# Patient Record
Sex: FEMALE | Race: WHITE | Employment: OTHER | URBAN - METROPOLITAN AREA
[De-identification: names, ages, dates, MRNs, and addresses within clinical notes are randomized per-mention and may not be internally consistent; named-entity substitution may affect disease eponyms.]

---

## 2017-03-31 ENCOUNTER — OFFICE VISIT (OUTPATIENT)
Dept: PULMONOLOGY | Age: 47
End: 2017-03-31
Payer: COMMERCIAL

## 2017-03-31 VITALS
RESPIRATION RATE: 16 BRPM | WEIGHT: 115 LBS | HEIGHT: 65 IN | SYSTOLIC BLOOD PRESSURE: 114 MMHG | DIASTOLIC BLOOD PRESSURE: 84 MMHG | TEMPERATURE: 97.1 F | HEART RATE: 102 BPM | OXYGEN SATURATION: 98 % | BODY MASS INDEX: 19.16 KG/M2

## 2017-03-31 DIAGNOSIS — J92.9 PLEURAL PLAQUE WITHOUT ASBESTOS: ICD-10-CM

## 2017-03-31 DIAGNOSIS — J98.4 APICAL LUNG SCARRING: ICD-10-CM

## 2017-03-31 DIAGNOSIS — J43.1 PANLOBULAR EMPHYSEMA (HCC): ICD-10-CM

## 2017-03-31 DIAGNOSIS — Z72.0 TOBACCO ABUSE: ICD-10-CM

## 2017-03-31 DIAGNOSIS — J44.9 COPD, MILD (HCC): Primary | ICD-10-CM

## 2017-03-31 PROCEDURE — 99214 OFFICE O/P EST MOD 30 MIN: CPT | Performed by: INTERNAL MEDICINE

## 2017-03-31 RX ORDER — BENZTROPINE MESYLATE 0.5 MG/1
TABLET ORAL
Refills: 0 | COMMUNITY
Start: 2017-02-02 | End: 2017-03-31

## 2017-03-31 RX ORDER — MIRTAZAPINE 30 MG/1
TABLET, FILM COATED ORAL
Refills: 0 | COMMUNITY
Start: 2017-03-01 | End: 2021-09-15

## 2017-05-03 ENCOUNTER — HOSPITAL ENCOUNTER (OUTPATIENT)
Dept: PULMONOLOGY | Age: 47
Setting detail: THERAPIES SERIES
Discharge: HOME | End: 2017-05-03
Payer: COMMERCIAL

## 2017-05-03 VITALS — BODY MASS INDEX: 19.34 KG/M2 | WEIGHT: 116.2 LBS

## 2017-05-03 PROCEDURE — G0424 PULMONARY REHAB W EXER: HCPCS

## 2017-05-10 ENCOUNTER — TELEPHONE (OUTPATIENT)
Dept: PULMONOLOGY | Age: 47
End: 2017-05-10

## 2017-05-10 DIAGNOSIS — J44.9 CHRONIC OBSTRUCTIVE PULMONARY DISEASE, UNSPECIFIED COPD TYPE (HCC): Primary | ICD-10-CM

## 2017-05-31 ENCOUNTER — HOSPITAL ENCOUNTER (OUTPATIENT)
Dept: PULMONOLOGY | Age: 47
Setting detail: THERAPIES SERIES
Discharge: HOME | End: 2017-05-31
Payer: COMMERCIAL

## 2017-05-31 VITALS — WEIGHT: 117 LBS | BODY MASS INDEX: 19.47 KG/M2

## 2017-05-31 PROCEDURE — G0424 PULMONARY REHAB W EXER: HCPCS

## 2017-06-06 ENCOUNTER — TELEPHONE (OUTPATIENT)
Dept: FAMILY MEDICINE CLINIC | Age: 47
End: 2017-06-06

## 2017-06-20 ENCOUNTER — TELEPHONE (OUTPATIENT)
Dept: FAMILY MEDICINE CLINIC | Age: 47
End: 2017-06-20

## 2017-06-23 ENCOUNTER — OFFICE VISIT (OUTPATIENT)
Dept: FAMILY MEDICINE CLINIC | Age: 47
End: 2017-06-23
Payer: COMMERCIAL

## 2017-06-23 VITALS
HEART RATE: 102 BPM | SYSTOLIC BLOOD PRESSURE: 110 MMHG | BODY MASS INDEX: 18.32 KG/M2 | HEIGHT: 66 IN | RESPIRATION RATE: 20 BRPM | WEIGHT: 114 LBS | OXYGEN SATURATION: 96 % | TEMPERATURE: 98.1 F | DIASTOLIC BLOOD PRESSURE: 73 MMHG

## 2017-06-23 DIAGNOSIS — K59.01 SLOW TRANSIT CONSTIPATION: ICD-10-CM

## 2017-06-23 DIAGNOSIS — R73.9 HYPERGLYCEMIA: ICD-10-CM

## 2017-06-23 DIAGNOSIS — M89.9 BONE DISORDER: ICD-10-CM

## 2017-06-23 DIAGNOSIS — R53.82 CHRONIC FATIGUE: ICD-10-CM

## 2017-06-23 DIAGNOSIS — R63.4 UNINTENDED WEIGHT LOSS: Primary | ICD-10-CM

## 2017-06-23 DIAGNOSIS — Z13.220 ENCOUNTER FOR LIPID SCREENING FOR CARDIOVASCULAR DISEASE: ICD-10-CM

## 2017-06-23 DIAGNOSIS — Z13.6 ENCOUNTER FOR LIPID SCREENING FOR CARDIOVASCULAR DISEASE: ICD-10-CM

## 2017-06-23 DIAGNOSIS — M62.830 MUSCLE SPASM OF BACK: ICD-10-CM

## 2017-06-23 DIAGNOSIS — J43.1 PANLOBULAR EMPHYSEMA (HCC): ICD-10-CM

## 2017-06-23 DIAGNOSIS — M62.838 MUSCLE SPASMS OF NECK: ICD-10-CM

## 2017-06-23 LAB — HBA1C MFR BLD: 5.1 %

## 2017-06-23 PROCEDURE — 99214 OFFICE O/P EST MOD 30 MIN: CPT | Performed by: FAMILY MEDICINE

## 2017-06-23 PROCEDURE — 83036 HEMOGLOBIN GLYCOSYLATED A1C: CPT | Performed by: FAMILY MEDICINE

## 2017-06-23 RX ORDER — BACLOFEN 10 MG/1
10 TABLET ORAL 3 TIMES DAILY PRN
Qty: 90 TABLET | Refills: 0 | Status: SHIPPED | OUTPATIENT
Start: 2017-06-23 | End: 2017-07-25 | Stop reason: SDUPTHER

## 2017-06-23 RX ORDER — DOCUSATE SODIUM 100 MG/1
100 CAPSULE, LIQUID FILLED ORAL 2 TIMES DAILY PRN
Qty: 60 CAPSULE | Refills: 3 | Status: SHIPPED | OUTPATIENT
Start: 2017-06-23 | End: 2018-07-06

## 2017-06-23 ASSESSMENT — ENCOUNTER SYMPTOMS
WHEEZING: 0
NAUSEA: 0
CHEST TIGHTNESS: 0
DIARRHEA: 0
ABDOMINAL DISTENTION: 0
SHORTNESS OF BREATH: 1
BACK PAIN: 1
VOMITING: 0
COUGH: 1
ABDOMINAL PAIN: 0
CONSTIPATION: 1

## 2017-06-24 PROBLEM — K59.01 SLOW TRANSIT CONSTIPATION: Status: ACTIVE | Noted: 2017-06-24

## 2017-06-24 PROBLEM — M62.838 MUSCLE SPASMS OF NECK: Status: ACTIVE | Noted: 2017-06-24

## 2017-06-24 PROBLEM — R53.82 CHRONIC FATIGUE: Status: ACTIVE | Noted: 2017-06-24

## 2017-06-24 PROBLEM — J43.1 PANLOBULAR EMPHYSEMA (HCC): Status: ACTIVE | Noted: 2017-06-24

## 2017-06-24 PROBLEM — M62.830 MUSCLE SPASM OF BACK: Status: ACTIVE | Noted: 2017-06-24

## 2017-06-24 PROBLEM — R73.9 HYPERGLYCEMIA: Status: ACTIVE | Noted: 2017-06-24

## 2017-07-25 DIAGNOSIS — M62.830 MUSCLE SPASM OF BACK: ICD-10-CM

## 2017-07-25 DIAGNOSIS — M62.838 MUSCLE SPASMS OF NECK: ICD-10-CM

## 2017-07-25 RX ORDER — BACLOFEN 10 MG/1
TABLET ORAL
Qty: 90 TABLET | Refills: 0 | Status: SHIPPED | OUTPATIENT
Start: 2017-07-25 | End: 2018-07-06

## 2017-09-29 ENCOUNTER — OFFICE VISIT (OUTPATIENT)
Dept: PULMONOLOGY | Age: 47
End: 2017-09-29
Payer: COMMERCIAL

## 2017-09-29 VITALS — BODY MASS INDEX: 17.36 KG/M2 | OXYGEN SATURATION: 96 % | HEART RATE: 105 BPM | WEIGHT: 108 LBS | HEIGHT: 66 IN

## 2017-09-29 VITALS
DIASTOLIC BLOOD PRESSURE: 70 MMHG | BODY MASS INDEX: 17.36 KG/M2 | TEMPERATURE: 97.1 F | WEIGHT: 108 LBS | OXYGEN SATURATION: 99 % | HEART RATE: 99 BPM | RESPIRATION RATE: 16 BRPM | SYSTOLIC BLOOD PRESSURE: 103 MMHG | HEIGHT: 66 IN

## 2017-09-29 DIAGNOSIS — J43.1 PANLOBULAR EMPHYSEMA (HCC): ICD-10-CM

## 2017-09-29 DIAGNOSIS — J44.9 COPD, MILD (HCC): Primary | ICD-10-CM

## 2017-09-29 DIAGNOSIS — Z23 NEED FOR PNEUMOCOCCAL VACCINATION: ICD-10-CM

## 2017-09-29 DIAGNOSIS — J44.9 COPD, MILD (HCC): ICD-10-CM

## 2017-09-29 DIAGNOSIS — Z23 ENCOUNTER FOR IMMUNIZATION: ICD-10-CM

## 2017-09-29 PROCEDURE — G0009 ADMIN PNEUMOCOCCAL VACCINE: HCPCS | Performed by: INTERNAL MEDICINE

## 2017-09-29 PROCEDURE — G0008 ADMIN INFLUENZA VIRUS VAC: HCPCS | Performed by: INTERNAL MEDICINE

## 2017-09-29 PROCEDURE — 90688 IIV4 VACCINE SPLT 0.5 ML IM: CPT | Performed by: INTERNAL MEDICINE

## 2017-09-29 PROCEDURE — 94726 PLETHYSMOGRAPHY LUNG VOLUMES: CPT | Performed by: INTERNAL MEDICINE

## 2017-09-29 PROCEDURE — 99214 OFFICE O/P EST MOD 30 MIN: CPT | Performed by: INTERNAL MEDICINE

## 2017-09-29 PROCEDURE — 94060 EVALUATION OF WHEEZING: CPT | Performed by: INTERNAL MEDICINE

## 2017-09-29 PROCEDURE — 94729 DIFFUSING CAPACITY: CPT | Performed by: INTERNAL MEDICINE

## 2017-09-29 PROCEDURE — 90732 PPSV23 VACC 2 YRS+ SUBQ/IM: CPT | Performed by: INTERNAL MEDICINE

## 2018-06-29 ENCOUNTER — TELEPHONE (OUTPATIENT)
Dept: PULMONOLOGY | Age: 48
End: 2018-06-29

## 2018-07-06 ENCOUNTER — OFFICE VISIT (OUTPATIENT)
Dept: FAMILY MEDICINE CLINIC | Age: 48
End: 2018-07-06
Payer: COMMERCIAL

## 2018-07-06 VITALS
HEIGHT: 66 IN | DIASTOLIC BLOOD PRESSURE: 69 MMHG | WEIGHT: 117 LBS | OXYGEN SATURATION: 99 % | BODY MASS INDEX: 18.8 KG/M2 | HEART RATE: 101 BPM | SYSTOLIC BLOOD PRESSURE: 98 MMHG

## 2018-07-06 DIAGNOSIS — F43.10 PTSD (POST-TRAUMATIC STRESS DISORDER): ICD-10-CM

## 2018-07-06 DIAGNOSIS — Z13.31 POSITIVE DEPRESSION SCREENING: ICD-10-CM

## 2018-07-06 DIAGNOSIS — J43.1 PANLOBULAR EMPHYSEMA (HCC): ICD-10-CM

## 2018-07-06 DIAGNOSIS — R10.9 CHRONIC ABDOMINAL PAIN: ICD-10-CM

## 2018-07-06 DIAGNOSIS — G89.29 CHRONIC ABDOMINAL PAIN: ICD-10-CM

## 2018-07-06 DIAGNOSIS — K59.01 SLOW TRANSIT CONSTIPATION: Primary | ICD-10-CM

## 2018-07-06 DIAGNOSIS — F33.1 MODERATE EPISODE OF RECURRENT MAJOR DEPRESSIVE DISORDER (HCC): ICD-10-CM

## 2018-07-06 DIAGNOSIS — R73.9 HYPERGLYCEMIA: ICD-10-CM

## 2018-07-06 DIAGNOSIS — Z13.6 SCREENING FOR CARDIOVASCULAR CONDITION: ICD-10-CM

## 2018-07-06 LAB — HBA1C MFR BLD: 5.4 %

## 2018-07-06 PROCEDURE — G8420 CALC BMI NORM PARAMETERS: HCPCS | Performed by: FAMILY MEDICINE

## 2018-07-06 PROCEDURE — G8427 DOCREV CUR MEDS BY ELIG CLIN: HCPCS | Performed by: FAMILY MEDICINE

## 2018-07-06 PROCEDURE — G8431 POS CLIN DEPRES SCRN F/U DOC: HCPCS | Performed by: FAMILY MEDICINE

## 2018-07-06 PROCEDURE — G8926 SPIRO NO PERF OR DOC: HCPCS | Performed by: FAMILY MEDICINE

## 2018-07-06 PROCEDURE — 3023F SPIROM DOC REV: CPT | Performed by: FAMILY MEDICINE

## 2018-07-06 PROCEDURE — 96160 PT-FOCUSED HLTH RISK ASSMT: CPT | Performed by: FAMILY MEDICINE

## 2018-07-06 PROCEDURE — 83036 HEMOGLOBIN GLYCOSYLATED A1C: CPT | Performed by: FAMILY MEDICINE

## 2018-07-06 PROCEDURE — 4004F PT TOBACCO SCREEN RCVD TLK: CPT | Performed by: FAMILY MEDICINE

## 2018-07-06 PROCEDURE — 99214 OFFICE O/P EST MOD 30 MIN: CPT | Performed by: FAMILY MEDICINE

## 2018-07-06 RX ORDER — ESCITALOPRAM OXALATE 20 MG/1
20 TABLET ORAL DAILY
COMMUNITY

## 2018-07-06 RX ORDER — POLYETHYLENE GLYCOL 3350 17 G/17G
17 POWDER, FOR SOLUTION ORAL DAILY PRN
Qty: 1 BOTTLE | Refills: 3 | Status: SHIPPED | OUTPATIENT
Start: 2018-07-06 | End: 2018-12-27

## 2018-07-06 RX ORDER — SENNA AND DOCUSATE SODIUM 50; 8.6 MG/1; MG/1
1 TABLET, FILM COATED ORAL DAILY PRN
Qty: 30 TABLET | Refills: 0 | Status: SHIPPED | OUTPATIENT
Start: 2018-07-06 | End: 2020-05-11 | Stop reason: SDUPTHER

## 2018-07-06 ASSESSMENT — PATIENT HEALTH QUESTIONNAIRE - PHQ9
4. FEELING TIRED OR HAVING LITTLE ENERGY: 3
6. FEELING BAD ABOUT YOURSELF - OR THAT YOU ARE A FAILURE OR HAVE LET YOURSELF OR YOUR FAMILY DOWN: 1
2. FEELING DOWN, DEPRESSED OR HOPELESS: 1
8. MOVING OR SPEAKING SO SLOWLY THAT OTHER PEOPLE COULD HAVE NOTICED. OR THE OPPOSITE, BEING SO FIGETY OR RESTLESS THAT YOU HAVE BEEN MOVING AROUND A LOT MORE THAN USUAL: 0
SUM OF ALL RESPONSES TO PHQ9 QUESTIONS 1 & 2: 3
10. IF YOU CHECKED OFF ANY PROBLEMS, HOW DIFFICULT HAVE THESE PROBLEMS MADE IT FOR YOU TO DO YOUR WORK, TAKE CARE OF THINGS AT HOME, OR GET ALONG WITH OTHER PEOPLE: 2
7. TROUBLE CONCENTRATING ON THINGS, SUCH AS READING THE NEWSPAPER OR WATCHING TELEVISION: 3
3. TROUBLE FALLING OR STAYING ASLEEP: 0
5. POOR APPETITE OR OVEREATING: 0
SUM OF ALL RESPONSES TO PHQ QUESTIONS 1-9: 10
9. THOUGHTS THAT YOU WOULD BE BETTER OFF DEAD, OR OF HURTING YOURSELF: 0
1. LITTLE INTEREST OR PLEASURE IN DOING THINGS: 2

## 2018-07-06 ASSESSMENT — ENCOUNTER SYMPTOMS
NAUSEA: 0
CONSTIPATION: 1
WHEEZING: 0
RECTAL PAIN: 0
SHORTNESS OF BREATH: 1
BLOOD IN STOOL: 0
CHEST TIGHTNESS: 0
ABDOMINAL PAIN: 1
COUGH: 0
DIARRHEA: 0
VOMITING: 0
ABDOMINAL DISTENTION: 1

## 2018-07-06 NOTE — PATIENT INSTRUCTIONS
bowel movement. · Support your feet with a small step stool when you sit on the toilet. This helps flex your hips and places your pelvis in a squatting position. · Your doctor may recommend an over-the-counter laxative to relieve your constipation. Examples are Milk of Magnesia and MiraLax. Read and follow all instructions on the label. Do not use laxatives on a long-term basis. When should you call for help? Call your doctor now or seek immediate medical care if:    · You have new or worse belly pain.     · You have new or worse nausea or vomiting.     · You have blood in your stools.    Watch closely for changes in your health, and be sure to contact your doctor if:    · Your constipation is getting worse.     · You do not get better as expected. Where can you learn more? Go to https://[employer]. org and sign in to your [employer] account. Enter 21 [phone] in the [employer]box box to learn more about \"Constipation: Care Instructions. \"     If you do not have an account, please click on the \"Sign Up Now\" link. Current as of: November 20, 2017  Content Version: 11.6  © 4883-5109 [employer]. Care instructions adapted under license by [facility] ([facility]). If you have questions about a medical condition or this instruction, always ask your healthcare professional. [address] any warranty or liability for your use of this information.
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
## 2018-07-06 NOTE — PROGRESS NOTES
Addressed during office visit today, A1c*  within normal limits   Continue current treatment discussed during visit

## 2018-07-07 PROBLEM — M62.830 MUSCLE SPASM OF BACK: Status: RESOLVED | Noted: 2017-06-24 | Resolved: 2018-07-07

## 2018-07-07 PROBLEM — F43.10 PTSD (POST-TRAUMATIC STRESS DISORDER): Status: ACTIVE | Noted: 2018-07-07

## 2018-07-07 PROBLEM — G89.29 CHRONIC ABDOMINAL PAIN: Status: ACTIVE | Noted: 2018-07-07

## 2018-07-07 PROBLEM — R10.9 CHRONIC ABDOMINAL PAIN: Status: ACTIVE | Noted: 2018-07-07

## 2018-07-07 PROBLEM — M62.838 MUSCLE SPASMS OF NECK: Status: RESOLVED | Noted: 2017-06-24 | Resolved: 2018-07-07

## 2018-12-27 ENCOUNTER — HOSPITAL ENCOUNTER (EMERGENCY)
Age: 48
Discharge: HOME | End: 2018-12-27
Attending: EMERGENCY MEDICINE
Payer: COMMERCIAL

## 2018-12-27 ENCOUNTER — APPOINTMENT (OUTPATIENT)
Dept: CT IMAGING | Age: 48
End: 2018-12-27
Payer: COMMERCIAL

## 2018-12-27 ENCOUNTER — APPOINTMENT (OUTPATIENT)
Dept: GENERAL RADIOLOGY | Age: 48
End: 2018-12-27
Payer: COMMERCIAL

## 2018-12-27 VITALS
WEIGHT: 120 LBS | TEMPERATURE: 98.1 F | SYSTOLIC BLOOD PRESSURE: 115 MMHG | RESPIRATION RATE: 16 BRPM | BODY MASS INDEX: 19.29 KG/M2 | HEART RATE: 96 BPM | HEIGHT: 66 IN | DIASTOLIC BLOOD PRESSURE: 82 MMHG | OXYGEN SATURATION: 95 %

## 2018-12-27 DIAGNOSIS — R10.84 GENERALIZED ABDOMINAL PAIN: ICD-10-CM

## 2018-12-27 DIAGNOSIS — K59.00 CONSTIPATION, UNSPECIFIED CONSTIPATION TYPE: Primary | ICD-10-CM

## 2018-12-27 LAB
-: ABNORMAL
ABSOLUTE EOS #: 0.1 K/UL (ref 0–0.4)
ABSOLUTE IMMATURE GRANULOCYTE: ABNORMAL K/UL (ref 0–0.3)
ABSOLUTE LYMPH #: 1.3 K/UL (ref 1–4.8)
ABSOLUTE MONO #: 0.4 K/UL (ref 0.1–1.3)
ALBUMIN SERPL-MCNC: 3.9 G/DL (ref 3.5–5.2)
ALBUMIN/GLOBULIN RATIO: ABNORMAL (ref 1–2.5)
ALP BLD-CCNC: 79 U/L (ref 35–104)
ALT SERPL-CCNC: 8 U/L (ref 5–33)
AMORPHOUS: ABNORMAL
ANION GAP SERPL CALCULATED.3IONS-SCNC: 9 MMOL/L (ref 9–17)
AST SERPL-CCNC: 14 U/L
BACTERIA: ABNORMAL
BASOPHILS # BLD: 1 % (ref 0–2)
BASOPHILS ABSOLUTE: 0.1 K/UL (ref 0–0.2)
BILIRUB SERPL-MCNC: 0.15 MG/DL (ref 0.3–1.2)
BILIRUBIN URINE: ABNORMAL
BUN BLDV-MCNC: 10 MG/DL (ref 6–20)
BUN/CREAT BLD: ABNORMAL (ref 9–20)
CALCIUM SERPL-MCNC: 8.9 MG/DL (ref 8.6–10.4)
CASTS UA: ABNORMAL /LPF
CHLORIDE BLD-SCNC: 104 MMOL/L (ref 98–107)
CO2: 25 MMOL/L (ref 20–31)
COLOR: YELLOW
COMMENT UA: ABNORMAL
CREAT SERPL-MCNC: 0.67 MG/DL (ref 0.5–0.9)
CRYSTALS, UA: ABNORMAL /HPF
DIFFERENTIAL TYPE: ABNORMAL
EOSINOPHILS RELATIVE PERCENT: 2 % (ref 0–4)
EPITHELIAL CELLS UA: ABNORMAL /HPF
GFR AFRICAN AMERICAN: >60 ML/MIN
GFR NON-AFRICAN AMERICAN: >60 ML/MIN
GFR SERPL CREATININE-BSD FRML MDRD: ABNORMAL ML/MIN/{1.73_M2}
GFR SERPL CREATININE-BSD FRML MDRD: ABNORMAL ML/MIN/{1.73_M2}
GLUCOSE BLD-MCNC: 77 MG/DL (ref 70–99)
GLUCOSE URINE: NEGATIVE
HCT VFR BLD CALC: 34.7 % (ref 36–46)
HEMOGLOBIN: 11.7 G/DL (ref 12–16)
IMMATURE GRANULOCYTES: ABNORMAL %
KETONES, URINE: ABNORMAL
LEUKOCYTE ESTERASE, URINE: NEGATIVE
LIPASE: 35 U/L (ref 13–60)
LYMPHOCYTES # BLD: 17 % (ref 24–44)
MCH RBC QN AUTO: 29.8 PG (ref 26–34)
MCHC RBC AUTO-ENTMCNC: 33.8 G/DL (ref 31–37)
MCV RBC AUTO: 88.1 FL (ref 80–100)
MONOCYTES # BLD: 5 % (ref 1–7)
MUCUS: ABNORMAL
NITRITE, URINE: NEGATIVE
NRBC AUTOMATED: ABNORMAL PER 100 WBC
OTHER OBSERVATIONS UA: ABNORMAL
PDW BLD-RTO: 14.9 % (ref 11.5–14.9)
PH UA: 6.5 (ref 5–8)
PLATELET # BLD: 245 K/UL (ref 150–450)
PLATELET ESTIMATE: ABNORMAL
PMV BLD AUTO: 6.8 FL (ref 6–12)
POTASSIUM SERPL-SCNC: 4.1 MMOL/L (ref 3.7–5.3)
PROTEIN UA: NEGATIVE
RBC # BLD: 3.93 M/UL (ref 4–5.2)
RBC # BLD: ABNORMAL 10*6/UL
RBC UA: ABNORMAL /HPF
RENAL EPITHELIAL, UA: ABNORMAL /HPF
SEG NEUTROPHILS: 75 % (ref 36–66)
SEGMENTED NEUTROPHILS ABSOLUTE COUNT: 5.6 K/UL (ref 1.3–9.1)
SODIUM BLD-SCNC: 138 MMOL/L (ref 135–144)
SPECIFIC GRAVITY UA: 1.02 (ref 1–1.03)
TOTAL PROTEIN: 7.4 G/DL (ref 6.4–8.3)
TRICHOMONAS: ABNORMAL
TURBIDITY: CLEAR
URINE HGB: NEGATIVE
UROBILINOGEN, URINE: NORMAL
WBC # BLD: 7.4 K/UL (ref 3.5–11)
WBC # BLD: ABNORMAL 10*3/UL
WBC UA: ABNORMAL /HPF
YEAST: ABNORMAL

## 2018-12-27 PROCEDURE — 2580000003 HC RX 258: Performed by: EMERGENCY MEDICINE

## 2018-12-27 PROCEDURE — 6360000004 HC RX CONTRAST MEDICATION: Performed by: EMERGENCY MEDICINE

## 2018-12-27 PROCEDURE — 81001 URINALYSIS AUTO W/SCOPE: CPT

## 2018-12-27 PROCEDURE — 87086 URINE CULTURE/COLONY COUNT: CPT

## 2018-12-27 PROCEDURE — 96372 THER/PROPH/DIAG INJ SC/IM: CPT

## 2018-12-27 PROCEDURE — 96374 THER/PROPH/DIAG INJ IV PUSH: CPT

## 2018-12-27 PROCEDURE — 80053 COMPREHEN METABOLIC PANEL: CPT

## 2018-12-27 PROCEDURE — 99284 EMERGENCY DEPT VISIT MOD MDM: CPT

## 2018-12-27 PROCEDURE — 74177 CT ABD & PELVIS W/CONTRAST: CPT

## 2018-12-27 PROCEDURE — 83690 ASSAY OF LIPASE: CPT

## 2018-12-27 PROCEDURE — 74022 RADEX COMPL AQT ABD SERIES: CPT

## 2018-12-27 PROCEDURE — 6360000002 HC RX W HCPCS: Performed by: EMERGENCY MEDICINE

## 2018-12-27 PROCEDURE — 36415 COLL VENOUS BLD VENIPUNCTURE: CPT

## 2018-12-27 PROCEDURE — 85025 COMPLETE CBC W/AUTO DIFF WBC: CPT

## 2018-12-27 RX ORDER — 0.9 % SODIUM CHLORIDE 0.9 %
1000 INTRAVENOUS SOLUTION INTRAVENOUS ONCE
Status: COMPLETED | OUTPATIENT
Start: 2018-12-27 | End: 2018-12-27

## 2018-12-27 RX ORDER — BISACODYL 10 MG
10 SUPPOSITORY, RECTAL RECTAL DAILY
Qty: 30 SUPPOSITORY | Refills: 0 | Status: SHIPPED | OUTPATIENT
Start: 2018-12-27 | End: 2019-01-26

## 2018-12-27 RX ORDER — SODIUM CHLORIDE 0.9 % (FLUSH) 0.9 %
10 SYRINGE (ML) INJECTION PRN
Status: DISCONTINUED | OUTPATIENT
Start: 2018-12-27 | End: 2018-12-27 | Stop reason: HOSPADM

## 2018-12-27 RX ORDER — ONDANSETRON 2 MG/ML
4 INJECTION INTRAMUSCULAR; INTRAVENOUS ONCE
Status: COMPLETED | OUTPATIENT
Start: 2018-12-27 | End: 2018-12-27

## 2018-12-27 RX ORDER — TRAZODONE HYDROCHLORIDE 150 MG/1
75 TABLET ORAL NIGHTLY
COMMUNITY

## 2018-12-27 RX ORDER — ONDANSETRON 4 MG/1
4 TABLET, ORALLY DISINTEGRATING ORAL EVERY 8 HOURS PRN
Qty: 10 TABLET | Refills: 0 | Status: SHIPPED | OUTPATIENT
Start: 2018-12-27 | End: 2021-09-15

## 2018-12-27 RX ORDER — DICYCLOMINE HYDROCHLORIDE 10 MG/ML
20 INJECTION INTRAMUSCULAR ONCE
Status: COMPLETED | OUTPATIENT
Start: 2018-12-27 | End: 2018-12-27

## 2018-12-27 RX ORDER — 0.9 % SODIUM CHLORIDE 0.9 %
80 INTRAVENOUS SOLUTION INTRAVENOUS ONCE
Status: COMPLETED | OUTPATIENT
Start: 2018-12-27 | End: 2018-12-27

## 2018-12-27 RX ORDER — DICYCLOMINE HYDROCHLORIDE 10 MG/1
10 CAPSULE ORAL EVERY 6 HOURS PRN
Qty: 20 CAPSULE | Refills: 0 | Status: SHIPPED | OUTPATIENT
Start: 2018-12-27 | End: 2021-09-15

## 2018-12-27 RX ADMIN — DICYCLOMINE HYDROCHLORIDE 20 MG: 20 INJECTION, SOLUTION INTRAMUSCULAR at 12:34

## 2018-12-27 RX ADMIN — ONDANSETRON 4 MG: 2 INJECTION INTRAMUSCULAR; INTRAVENOUS at 12:35

## 2018-12-27 RX ADMIN — Medication 10 ML: at 13:31

## 2018-12-27 RX ADMIN — SODIUM CHLORIDE 80 ML: 9 INJECTION, SOLUTION INTRAVENOUS at 13:31

## 2018-12-27 RX ADMIN — SODIUM CHLORIDE 1000 ML: 9 INJECTION, SOLUTION INTRAVENOUS at 12:34

## 2018-12-27 RX ADMIN — IOPAMIDOL 75 ML: 755 INJECTION, SOLUTION INTRAVENOUS at 13:31

## 2018-12-27 ASSESSMENT — ENCOUNTER SYMPTOMS
EYE PAIN: 0
ABDOMINAL PAIN: 1
TROUBLE SWALLOWING: 0
FACIAL SWELLING: 0
RHINORRHEA: 0
COUGH: 0
EYE DISCHARGE: 0
SINUS PRESSURE: 0
SHORTNESS OF BREATH: 0
BLOOD IN STOOL: 0
ABDOMINAL DISTENTION: 1
EYE REDNESS: 0
NAUSEA: 1
VOMITING: 0
COLOR CHANGE: 0
SORE THROAT: 0
DIARRHEA: 0
WHEEZING: 0
BACK PAIN: 0
CONSTIPATION: 1
CHEST TIGHTNESS: 0

## 2018-12-27 ASSESSMENT — PAIN DESCRIPTION - PAIN TYPE: TYPE: CHRONIC PAIN

## 2018-12-27 ASSESSMENT — PAIN SCALES - GENERAL: PAINLEVEL_OUTOF10: 7

## 2018-12-27 ASSESSMENT — PAIN DESCRIPTION - LOCATION: LOCATION: ABDOMEN

## 2018-12-27 NOTE — ED PROVIDER NOTES
Murali       CURRENT MEDICATIONS       Previous Medications    CLONAZEPAM (KLONOPIN) 0.5 MG TABLET    Take 0.5 mg by mouth daily as needed for Anxiety     DIPHENHYDRAMINE (BENADRYL) 25 MG TABLET    Take 25 mg by mouth every 6 hours as needed for Itching    EPINEPHRINE (EPIPEN) 0.3 MG/0.3ML SOAJ INJECTION    Use as directed for allergic reaction    ESCITALOPRAM (LEXAPRO) 10 MG TABLET    Take 20 mg by mouth daily     IBUPROFEN (ADVIL;MOTRIN) 200 MG TABLET    Take 600 mg by mouth every 6 hours as needed for Pain    MIRTAZAPINE (REMERON) 30 MG TABLET    take 1 tablet by mouth at bedtime    QUETIAPINE (SEROQUEL) 200 MG TABLET    Take 1 tablet by mouth 2 times daily at 0800 and 1400    QUETIAPINE (SEROQUEL) 400 MG TABLET    Take 1 tablet by mouth daily    SENNOSIDES-DOCUSATE SODIUM (SENOKOT-S) 8.6-50 MG TABLET    Take 1 tablet by mouth daily as needed for Constipation Try once a week every week    TRAZODONE (DESYREL) 150 MG TABLET    Take 150 mg by mouth nightly       ALLERGIES     is allergic to bee pollen; neosporin [neomycin-polymyxin-gramicidin]; and triple antibiotic [neomycin-bacitracin zn-polymyx]. SOCIAL HISTORY      reports that she has been smoking Cigarettes. She has a 30.00 pack-year smoking history. She has never used smokeless tobacco. She reports that she drinks alcohol. She reports that she does not use drugs. PHYSICAL EXAM     INITIAL VITALS: /82   Pulse 116   Temp 98.1 °F (36.7 °C) (Oral)   Resp 16   Ht 5' 6\" (1.676 m)   Wt 120 lb (54.4 kg)   LMP  (LMP Unknown)   SpO2 99%   BMI 19.37 kg/m²      Physical Exam   Constitutional: She is oriented to person, place, and time. She appears well-developed and well-nourished. No distress. HENT:   Head: Normocephalic and atraumatic. Eyes: Pupils are equal, round, and reactive to light. Conjunctivae and EOM are normal. Right eye exhibits no discharge. Left eye exhibits no discharge. No scleral icterus.    Cardiovascular: Normal rate, regular rhythm and normal heart sounds. Exam reveals no gallop and no friction rub. No murmur heard. Pulmonary/Chest: Effort normal and breath sounds normal. No respiratory distress. She has no wheezes. She has no rales. She exhibits no tenderness. Abdominal: Soft. Bowel sounds are normal. She exhibits no distension and no mass. There is tenderness in the suprapubic area, left upper quadrant and left lower quadrant. There is no rigidity, no rebound, no guarding, no CVA tenderness, no tenderness at McBurney's point and negative Prieto's sign. Musculoskeletal: Normal range of motion. She exhibits no edema or tenderness. Neurological: She is alert and oriented to person, place, and time. She displays normal reflexes. No cranial nerve deficit. She exhibits normal muscle tone. Coordination normal.   Skin: Skin is warm and dry. No rash noted. She is not diaphoretic. No erythema. No pallor. Psychiatric: She has a normal mood and affect. Her behavior is normal. Judgment and thought content normal.   Nursing note and vitals reviewed. DIAGNOSTIC RESULTS     RADIOLOGY:All plain film, CT,MRI, and formal ultrasound images (except ED bedside ultrasound) are read by the radiologist and the interpretations are directly viewed by the emergency physician. Xr Acute Abd Series Chest 1 Vw    Result Date: 12/27/2018  EXAMINATION: TWO XRAY VIEWS OF THE ABDOMEN AND SINGLE  XRAY VIEW OF THE CHEST 12/27/2018 12:29 pm COMPARISON: None. HISTORY: ORDERING SYSTEM PROVIDED HISTORY: Pain TECHNOLOGIST PROVIDED HISTORY: Pain Ordering Physician Provided Reason for Exam: constipation, abd. pain Acuity: Unknown Type of Exam: Unknown FINDINGS: The cardiomediastinal silhouette is unremarkable. No acute infiltrate, pleural fluid or evidence of overt failure. Emphysematous changes with biapical fibrotic changes are noted. The abdominal bowel gas pattern is nonspecific. Scattered colonic gas and stool.   No small bowel distention, air-fluid Vomiting     Dispense:  10 tablet     Refill:  0    dicyclomine (BENTYL) 10 MG capsule     Sig: Take 1 capsule by mouth every 6 hours as needed (cramps)     Dispense:  20 capsule     Refill:  0    magnesium citrate solution     Sig: Take 296 mLs by mouth once for 1 dose     Dispense:  296 mL     Refill:  0    bisacodyl (DULCOLAX) 10 MG suppository     Sig: Place 1 suppository rectally daily     Dispense:  30 suppository     Refill:  0       -------------------------  2:11 PM  Patient was updated on results. I offered to give the patient an enema here to see if she could have a good bowel movement and feel better but she would prefer to do this at home. We'll give her a spot story and laxatives at home and then have her follow-up with PCP. CONSULTS:  None    PROCEDURES:  None    FINAL IMPRESSION      1. Constipation, unspecified constipation type    2.  Generalized abdominal pain          DISPOSITION/PLAN   DISPOSITION Decision To Discharge 12/27/2018 02:08:46 PM      PATIENT REFERREDTO:  Eden Mcgarry MD  [address] [place] [address].  [address] [address]  [phone]    In 1 week      [facility]  [facility] [address]  [phone]    If symptoms worsen      DISCHARGEMEDICATIONS:  New Prescriptions    BISACODYL (DULCOLAX) 10 MG SUPPOSITORY    Place 1 suppository rectally daily    DICYCLOMINE (BENTYL) 10 MG CAPSULE    Take 1 capsule by mouth every 6 hours as needed (cramps)    MAGNESIUM CITRATE SOLUTION    Take 296 mLs by mouth once for 1 dose    ONDANSETRON (ZOFRAN ODT) 4 MG DISINTEGRATING TABLET    Take 1 tablet by mouth every 8 hours as needed for Nausea or Vomiting       (Please note that portions of this note were completed with a voice recognition program.  Efforts were made to edit thedictations but occasionally words are mis-transcribed.)    Chris Costello MD  Attending Emergency Physician                        Chris Costello MD  12/27/18 1439

## 2018-12-28 ENCOUNTER — TELEPHONE (OUTPATIENT)
Dept: FAMILY MEDICINE CLINIC | Age: 48
End: 2018-12-28

## 2018-12-28 LAB
CULTURE: NORMAL
Lab: NORMAL
SPECIMEN DESCRIPTION: NORMAL
STATUS: NORMAL

## 2020-05-11 ENCOUNTER — TELEPHONE (OUTPATIENT)
Dept: ADMINISTRATIVE | Age: 50
End: 2020-05-11

## 2020-05-11 ENCOUNTER — APPOINTMENT (OUTPATIENT)
Dept: GENERAL RADIOLOGY | Age: 50
End: 2020-05-11
Payer: COMMERCIAL

## 2020-05-11 ENCOUNTER — HOSPITAL ENCOUNTER (EMERGENCY)
Age: 50
Discharge: HOME | End: 2020-05-11
Attending: EMERGENCY MEDICINE
Payer: COMMERCIAL

## 2020-05-11 VITALS
RESPIRATION RATE: 14 BRPM | DIASTOLIC BLOOD PRESSURE: 75 MMHG | SYSTOLIC BLOOD PRESSURE: 113 MMHG | HEART RATE: 102 BPM | BODY MASS INDEX: 17.68 KG/M2 | TEMPERATURE: 97.3 F | HEIGHT: 66 IN | WEIGHT: 110 LBS | OXYGEN SATURATION: 98 %

## 2020-05-11 LAB
-: NORMAL
ABSOLUTE EOS #: 0.3 K/UL (ref 0–0.4)
ABSOLUTE IMMATURE GRANULOCYTE: ABNORMAL K/UL (ref 0–0.3)
ABSOLUTE LYMPH #: 1.7 K/UL (ref 1–4.8)
ABSOLUTE MONO #: 0.5 K/UL (ref 0.1–1.3)
ALBUMIN SERPL-MCNC: 4.6 G/DL (ref 3.5–5.2)
ALBUMIN/GLOBULIN RATIO: ABNORMAL (ref 1–2.5)
ALP BLD-CCNC: 82 U/L (ref 35–104)
ALT SERPL-CCNC: 9 U/L (ref 5–33)
AMORPHOUS: NORMAL
ANION GAP SERPL CALCULATED.3IONS-SCNC: 13 MMOL/L (ref 9–17)
AST SERPL-CCNC: 16 U/L
BACTERIA: NORMAL
BASOPHILS # BLD: 1 % (ref 0–2)
BASOPHILS ABSOLUTE: 0.1 K/UL (ref 0–0.2)
BILIRUB SERPL-MCNC: 0.29 MG/DL (ref 0.3–1.2)
BILIRUBIN URINE: NEGATIVE
BUN BLDV-MCNC: 11 MG/DL (ref 6–20)
BUN/CREAT BLD: ABNORMAL (ref 9–20)
CALCIUM SERPL-MCNC: 9.3 MG/DL (ref 8.6–10.4)
CASTS UA: NORMAL /LPF
CHLORIDE BLD-SCNC: 105 MMOL/L (ref 98–107)
CO2: 22 MMOL/L (ref 20–31)
COLOR: YELLOW
COMMENT UA: ABNORMAL
CREAT SERPL-MCNC: 0.88 MG/DL (ref 0.5–0.9)
CRYSTALS, UA: NORMAL /HPF
DIFFERENTIAL TYPE: ABNORMAL
EOSINOPHILS RELATIVE PERCENT: 6 % (ref 0–4)
EPITHELIAL CELLS UA: NORMAL /HPF
GFR AFRICAN AMERICAN: >60 ML/MIN
GFR NON-AFRICAN AMERICAN: >60 ML/MIN
GFR SERPL CREATININE-BSD FRML MDRD: ABNORMAL ML/MIN/{1.73_M2}
GFR SERPL CREATININE-BSD FRML MDRD: ABNORMAL ML/MIN/{1.73_M2}
GLUCOSE BLD-MCNC: 96 MG/DL (ref 70–99)
GLUCOSE URINE: NEGATIVE
HCG QUALITATIVE: NEGATIVE
HCT VFR BLD CALC: 41.2 % (ref 36–46)
HEMOGLOBIN: 13.4 G/DL (ref 12–16)
IMMATURE GRANULOCYTES: ABNORMAL %
KETONES, URINE: NEGATIVE
LEUKOCYTE ESTERASE, URINE: NEGATIVE
LIPASE: 41 U/L (ref 13–60)
LYMPHOCYTES # BLD: 29 % (ref 24–44)
MCH RBC QN AUTO: 28.5 PG (ref 26–34)
MCHC RBC AUTO-ENTMCNC: 32.4 G/DL (ref 31–37)
MCV RBC AUTO: 87.8 FL (ref 80–100)
MONOCYTES # BLD: 8 % (ref 1–7)
MUCUS: NORMAL
NITRITE, URINE: NEGATIVE
NRBC AUTOMATED: ABNORMAL PER 100 WBC
OTHER OBSERVATIONS UA: NORMAL
PDW BLD-RTO: 13.7 % (ref 11.5–14.9)
PH UA: 6.5 (ref 5–8)
PLATELET # BLD: 270 K/UL (ref 150–450)
PLATELET ESTIMATE: ABNORMAL
PMV BLD AUTO: 7.3 FL (ref 6–12)
POTASSIUM SERPL-SCNC: 4.4 MMOL/L (ref 3.7–5.3)
PROTEIN UA: NEGATIVE
RBC # BLD: 4.7 M/UL (ref 4–5.2)
RBC # BLD: ABNORMAL 10*6/UL
RBC UA: NORMAL /HPF
RENAL EPITHELIAL, UA: NORMAL /HPF
SEG NEUTROPHILS: 56 % (ref 36–66)
SEGMENTED NEUTROPHILS ABSOLUTE COUNT: 3.3 K/UL (ref 1.3–9.1)
SODIUM BLD-SCNC: 140 MMOL/L (ref 135–144)
SPECIFIC GRAVITY UA: 1.01 (ref 1–1.03)
TOTAL PROTEIN: 7.8 G/DL (ref 6.4–8.3)
TRICHOMONAS: NORMAL
TURBIDITY: ABNORMAL
URINE HGB: NEGATIVE
UROBILINOGEN, URINE: NORMAL
WBC # BLD: 5.8 K/UL (ref 3.5–11)
WBC # BLD: ABNORMAL 10*3/UL
WBC UA: NORMAL /HPF
YEAST: NORMAL

## 2020-05-11 PROCEDURE — 81001 URINALYSIS AUTO W/SCOPE: CPT

## 2020-05-11 PROCEDURE — 96372 THER/PROPH/DIAG INJ SC/IM: CPT

## 2020-05-11 PROCEDURE — 85025 COMPLETE CBC W/AUTO DIFF WBC: CPT

## 2020-05-11 PROCEDURE — 99284 EMERGENCY DEPT VISIT MOD MDM: CPT

## 2020-05-11 PROCEDURE — 36415 COLL VENOUS BLD VENIPUNCTURE: CPT

## 2020-05-11 PROCEDURE — 6360000002 HC RX W HCPCS: Performed by: EMERGENCY MEDICINE

## 2020-05-11 PROCEDURE — 74018 RADEX ABDOMEN 1 VIEW: CPT

## 2020-05-11 PROCEDURE — 80053 COMPREHEN METABOLIC PANEL: CPT

## 2020-05-11 PROCEDURE — 83690 ASSAY OF LIPASE: CPT

## 2020-05-11 PROCEDURE — 2580000003 HC RX 258: Performed by: EMERGENCY MEDICINE

## 2020-05-11 PROCEDURE — 84703 CHORIONIC GONADOTROPIN ASSAY: CPT

## 2020-05-11 RX ORDER — DOCUSATE SODIUM 100 MG/1
100 CAPSULE, LIQUID FILLED ORAL 2 TIMES DAILY
Qty: 30 CAPSULE | Refills: 0 | Status: SHIPPED | OUTPATIENT
Start: 2020-05-11 | End: 2021-12-16 | Stop reason: ALTCHOICE

## 2020-05-11 RX ORDER — SENNA AND DOCUSATE SODIUM 50; 8.6 MG/1; MG/1
1 TABLET, FILM COATED ORAL DAILY PRN
Qty: 10 TABLET | Refills: 0 | Status: SHIPPED | OUTPATIENT
Start: 2020-05-11 | End: 2021-12-16 | Stop reason: ALTCHOICE

## 2020-05-11 RX ORDER — DICYCLOMINE HYDROCHLORIDE 10 MG/ML
20 INJECTION INTRAMUSCULAR ONCE
Status: COMPLETED | OUTPATIENT
Start: 2020-05-11 | End: 2020-05-11

## 2020-05-11 RX ORDER — 0.9 % SODIUM CHLORIDE 0.9 %
1000 INTRAVENOUS SOLUTION INTRAVENOUS ONCE
Status: COMPLETED | OUTPATIENT
Start: 2020-05-11 | End: 2020-05-11

## 2020-05-11 RX ADMIN — SODIUM CHLORIDE 1000 ML: 9 INJECTION, SOLUTION INTRAVENOUS at 14:33

## 2020-05-11 RX ADMIN — DICYCLOMINE HYDROCHLORIDE 20 MG: 20 INJECTION, SOLUTION INTRAMUSCULAR at 14:33

## 2020-05-11 ASSESSMENT — PAIN DESCRIPTION - DESCRIPTORS: DESCRIPTORS: PRESSURE

## 2020-05-11 ASSESSMENT — PAIN DESCRIPTION - LOCATION: LOCATION: ABDOMEN

## 2020-05-11 ASSESSMENT — ENCOUNTER SYMPTOMS
WHEEZING: 0
SORE THROAT: 0
NAUSEA: 0
EYE DISCHARGE: 0
FACIAL SWELLING: 0
EYE PAIN: 0
RHINORRHEA: 0
CHEST TIGHTNESS: 0
SINUS PRESSURE: 0
CONSTIPATION: 1
VOMITING: 0
DIARRHEA: 0
BACK PAIN: 1
SHORTNESS OF BREATH: 0
BLOOD IN STOOL: 0
COLOR CHANGE: 0
TROUBLE SWALLOWING: 0
EYE REDNESS: 0
COUGH: 0
ABDOMINAL PAIN: 1

## 2020-05-11 ASSESSMENT — PAIN DESCRIPTION - ORIENTATION: ORIENTATION: LOWER;MID

## 2020-05-11 ASSESSMENT — PAIN SCALES - GENERAL: PAINLEVEL_OUTOF10: 6

## 2020-05-11 NOTE — ED PROVIDER NOTES
25 mg by mouth every 6 hours as needed for Itching    EPINEPHRINE (EPIPEN) 0.3 MG/0.3ML SOAJ INJECTION    Use as directed for allergic reaction    ESCITALOPRAM (LEXAPRO) 10 MG TABLET    Take 20 mg by mouth daily     IBUPROFEN (ADVIL;MOTRIN) 200 MG TABLET    Take 600 mg by mouth every 6 hours as needed for Pain    MIRTAZAPINE (REMERON) 30 MG TABLET    take 1 tablet by mouth at bedtime    ONDANSETRON (ZOFRAN ODT) 4 MG DISINTEGRATING TABLET    Take 1 tablet by mouth every 8 hours as needed for Nausea or Vomiting    QUETIAPINE (SEROQUEL) 200 MG TABLET    Take 1 tablet by mouth 2 times daily at 0800 and 1400    QUETIAPINE (SEROQUEL) 400 MG TABLET    Take 1 tablet by mouth daily    TRAZODONE (DESYREL) 150 MG TABLET    Take 75 mg by mouth nightly        ALLERGIES     is allergic to bee pollen; neosporin [neomycin-polymyxin-gramicidin]; and triple antibiotic [neomycin-bacitracin zn-polymyx]. SOCIAL HISTORY      reports that she has been smoking cigarettes. She has a 30.00 pack-year smoking history. She has never used smokeless tobacco. She reports current alcohol use. She reports that she does not use drugs. PHYSICAL EXAM     INITIAL VITALS: BP (!) 133/93   Pulse 114   Temp 97.3 °F (36.3 °C) (Temporal)   Resp 14   Ht 5' 6\" (1.676 m)   Wt 110 lb (49.9 kg)   LMP  (LMP Unknown)   SpO2 98%   BMI 17.75 kg/m²      Physical Exam  Vitals signs and nursing note reviewed. Constitutional:       General: She is in acute distress. Appearance: She is well-developed. She is not diaphoretic. HENT:      Head: Normocephalic and atraumatic. Eyes:      General: No scleral icterus. Right eye: No discharge. Left eye: No discharge. Conjunctiva/sclera: Conjunctivae normal.      Pupils: Pupils are equal, round, and reactive to light. Cardiovascular:      Rate and Rhythm: Normal rate and regular rhythm. Heart sounds: Normal heart sounds. No murmur. No friction rub. No gallop.     Pulmonary: below. Labs Reviewed   CBC WITH AUTO DIFFERENTIAL - Abnormal; Notable for the following components:       Result Value    Monocytes 8 (*)     Eosinophils % 6 (*)     All other components within normal limits   COMPREHENSIVE METABOLIC PANEL - Abnormal; Notable for the following components: Total Bilirubin 0.29 (*)     All other components within normal limits   URINE RT REFLEX TO CULTURE - Abnormal; Notable for the following components:    Turbidity UA CLOUDY (*)     All other components within normal limits   LIPASE   HCG, SERUM, QUALITATIVE   MICROSCOPIC URINALYSIS         MEDICAL DECISION MAKING:     Patient has a long history of constipation and she does have fullness especially in the left lower portion of her abdomen that could be consistent with constipation but her symptoms seem very severe so I do want to do a full her work-up. We will get an x-ray to make sure there is nothing more serious if it does show a lot of stool we will attempt an enema to try to give her relief. EMERGENCY DEPARTMENT COURSE:   Vitals:    Vitals:    05/11/20 1405   BP: (!) 133/93   Pulse: 114   Resp: 14   Temp: 97.3 °F (36.3 °C)   TempSrc: Temporal   SpO2: 98%   Weight: 110 lb (49.9 kg)   Height: 5' 6\" (1.676 m)       The patient was given the following medications while in the emergency department:  Orders Placed This Encounter   Medications    0.9 % sodium chloride bolus    dicyclomine (BENTYL) injection 20 mg    docusate sodium (COLACE) 100 MG capsule     Sig: Take 1 capsule by mouth 2 times daily     Dispense:  30 capsule     Refill:  0    sennosides-docusate sodium (SENOKOT-S) 8.6-50 MG tablet     Sig: Take 1 tablet by mouth daily as needed for Constipation Try once a week every week     Dispense:  10 tablet     Refill:  0    magnesium citrate solution     Sig: Take 296 mLs by mouth once for 1 dose     Dispense:  296 mL     Refill:  0       -------------------------  4:11 PM EDT  Patient was updated on results.

## 2020-05-11 NOTE — ED NOTES
Pt given instructions for follow-up and discharge. Pt given education on prescriptions. Pt verbalizes understanding. Pt is A&O x4, PWD, eupneic, and ambulatory with steady, even gait upon discharge.       Carli Tuttle RN  05/11/20 [id number]

## 2020-05-12 ENCOUNTER — CARE COORDINATION (OUTPATIENT)
Dept: CARE COORDINATION | Age: 50
End: 2020-05-12

## 2020-05-12 ENCOUNTER — TELEPHONE (OUTPATIENT)
Dept: FAMILY MEDICINE CLINIC | Age: 50
End: 2020-05-12

## 2020-05-13 ENCOUNTER — CARE COORDINATION (OUTPATIENT)
Dept: CARE COORDINATION | Age: 50
End: 2020-05-13

## 2020-05-13 ENCOUNTER — TELEPHONE (OUTPATIENT)
Dept: FAMILY MEDICINE CLINIC | Age: 50
End: 2020-05-13

## 2020-05-13 NOTE — CARE COORDINATION
water for at least 20 seconds, especially after blowing your nose, coughing, or sneezing; going to the bathroom; and before eating or preparing food. If soap and water are not readily available, use an alcohol-based hand  with at least 60% alcohol, covering all surfaces of your hands and rubbing them together until they feel dry. Soap and water are the best option if hands are visibly dirty. Avoid touching your eyes, nose, and mouth with unwashed hands. Avoid sharing personal household items  You should not share dishes, drinking glasses, cups, eating utensils, towels, or bedding with other people or pets in your home. After using these items, they should be washed thoroughly with soap and water. Clean all high-touch surfaces everyday  High touch surfaces include counters, tabletops, doorknobs, bathroom fixtures, toilets, phones, keyboards, tablets, and bedside tables. Also, clean any surfaces that may have blood, stool, or body fluids on them. Use a household cleaning spray or wipe, according to the label instructions. Labels contain instructions for safe and effective use of the cleaning product including precautions you should take when applying the product, such as wearing gloves and making sure you have good ventilation during use of the product. Monitor your symptoms  Seek prompt medical attention if your illness is worsening (e.g., difficulty breathing). Before seeking care, call your healthcare provider and tell them that you have, or are being evaluated for, COVID-19. Put on a facemask before you enter the facility. These steps will help the healthcare providers office to keep other people in the office or waiting room from getting infected or exposed. Ask your healthcare provider to call the local or state health department.  Persons who are placed under active monitoring or facilitated self-monitoring should follow instructions provided by their local health department or occupational

## 2020-07-23 ENCOUNTER — APPOINTMENT (OUTPATIENT)
Dept: GENERAL RADIOLOGY | Age: 50
End: 2020-07-23
Payer: COMMERCIAL

## 2020-07-23 ENCOUNTER — HOSPITAL ENCOUNTER (EMERGENCY)
Age: 50
Discharge: HOME | End: 2020-07-23
Attending: EMERGENCY MEDICINE
Payer: COMMERCIAL

## 2020-07-23 VITALS
TEMPERATURE: 97.8 F | OXYGEN SATURATION: 100 % | WEIGHT: 110 LBS | DIASTOLIC BLOOD PRESSURE: 87 MMHG | BODY MASS INDEX: 17.68 KG/M2 | RESPIRATION RATE: 16 BRPM | SYSTOLIC BLOOD PRESSURE: 130 MMHG | HEIGHT: 66 IN | HEART RATE: 93 BPM

## 2020-07-23 PROCEDURE — 6360000002 HC RX W HCPCS: Performed by: EMERGENCY MEDICINE

## 2020-07-23 PROCEDURE — 6370000000 HC RX 637 (ALT 250 FOR IP): Performed by: EMERGENCY MEDICINE

## 2020-07-23 PROCEDURE — 96372 THER/PROPH/DIAG INJ SC/IM: CPT

## 2020-07-23 PROCEDURE — 99284 EMERGENCY DEPT VISIT MOD MDM: CPT

## 2020-07-23 PROCEDURE — 71045 X-RAY EXAM CHEST 1 VIEW: CPT

## 2020-07-23 RX ORDER — IBUPROFEN 600 MG/1
600 TABLET ORAL EVERY 6 HOURS PRN
Qty: 120 TABLET | Refills: 0 | Status: SHIPPED | OUTPATIENT
Start: 2020-07-23 | End: 2021-09-27 | Stop reason: HOSPADM

## 2020-07-23 RX ORDER — LIDOCAINE 50 MG/G
1 PATCH TOPICAL DAILY
Qty: 30 PATCH | Refills: 0 | Status: SHIPPED | OUTPATIENT
Start: 2020-07-23 | End: 2021-09-15

## 2020-07-23 RX ORDER — LIDOCAINE 4 G/G
1 PATCH TOPICAL DAILY
Status: DISCONTINUED | OUTPATIENT
Start: 2020-07-23 | End: 2020-07-23 | Stop reason: HOSPADM

## 2020-07-23 RX ORDER — HYDROCODONE BITARTRATE AND ACETAMINOPHEN 5; 325 MG/1; MG/1
1 TABLET ORAL EVERY 6 HOURS PRN
Qty: 12 TABLET | Refills: 0 | Status: SHIPPED | OUTPATIENT
Start: 2020-07-23 | End: 2020-07-26

## 2020-07-23 RX ORDER — MORPHINE SULFATE 4 MG/ML
4 INJECTION, SOLUTION INTRAMUSCULAR; INTRAVENOUS ONCE
Status: COMPLETED | OUTPATIENT
Start: 2020-07-23 | End: 2020-07-23

## 2020-07-23 RX ADMIN — MORPHINE SULFATE 4 MG: 4 INJECTION, SOLUTION INTRAMUSCULAR; INTRAVENOUS at 13:44

## 2020-07-23 ASSESSMENT — ENCOUNTER SYMPTOMS
SHORTNESS OF BREATH: 0
BACK PAIN: 0
VOMITING: 0
COUGH: 0
NAUSEA: 0

## 2020-07-23 ASSESSMENT — PAIN SCALES - GENERAL
PAINLEVEL_OUTOF10: 7
PAINLEVEL_OUTOF10: 8

## 2020-07-23 NOTE — ED PROVIDER NOTES
EMERGENCY DEPARTMENT ENCOUNTER    Pt Name: Sandy Hunt  MRN: [id number]  [place] 1970  Date of evaluation: 7/23/20  CHIEF COMPLAINT       Chief Complaint   Patient presents with    Chest Pain     HISTORY OF PRESENT ILLNESS     Chest Pain   Pain location:  R lateral chest  Pain quality: aching    Pain radiates to:  Does not radiate  Pain severity:  Severe  Onset quality:  Gradual  Duration:  4 days  Timing:  Constant  Progression:  Worsening  Chronicity:  Recurrent  Context: breathing, movement and raising an arm    Relieved by:  Nothing  Worsened by:  Nothing  Ineffective treatments: tylenol and motrin at home not working. Associated symptoms: no back pain, no cough, no diaphoresis, no fever, no nausea, no shortness of breath and no vomiting        REVIEW OF SYSTEMS     Review of Systems   Constitutional: Negative for diaphoresis and fever. Respiratory: Negative for cough and shortness of breath. Cardiovascular: Positive for chest pain. Gastrointestinal: Negative for nausea and vomiting. Musculoskeletal: Negative for back pain. All other systems reviewed and are negative.     PASTMEDICAL HISTORY     Past Medical History:   Diagnosis Date    Acute bronchitis with bronchospasm 9/28/2016    Allergic rhinitis 9/28/2016    Anxiety     Anxiety and depression     Apical lung scarring     Right    Asthma     exercized induced    Borderline personality disorder 12/3/2014    Chronic abdominal pain 7/7/2018    Chronic fatigue 6/24/2017    COPD, mild (HCC)     Emphysema of lung (HCC)     Severe    History of bronchitis     History of chest pain     History of pneumocystis pneumonia 9/28/2016    History of suicide attempt     [facility] admissions [facility] for suicide attempts    Hyperglycemia 6/24/2017    Moderate episode of recurrent major depressive disorder ([facility] [address] 12/3/2014    Ovarian cyst 707    Panic disorder with agoraphobia and severe panic attacks 12/3/2014    ONDANSETRON (ZOFRAN ODT) 4 MG DISINTEGRATING TABLET    Take 1 tablet by mouth every 8 hours as needed for Nausea or Vomiting    QUETIAPINE (SEROQUEL) 200 MG TABLET    Take 1 tablet by mouth 2 times daily at 0800 and 1400    QUETIAPINE (SEROQUEL) 400 MG TABLET    Take 1 tablet by mouth daily    SENNOSIDES-DOCUSATE SODIUM (SENOKOT-S) 8.6-50 MG TABLET    Take 1 tablet by mouth daily as needed for Constipation Try once a week every week    TRAZODONE (DESYREL) 150 MG TABLET    Take 75 mg by mouth nightly      ALLERGIES     is allergic to bee pollen; neosporin [neomycin-polymyxin-gramicidin]; and triple antibiotic [neomycin-bacitracin zn-polymyx]. FAMILY HISTORY     She indicated that the status of her mother is unknown. She indicated that the status of her father is unknown. She indicated that the status of her sister is unknown. She indicated that the status of her other is unknown. She indicated that the status of her neg hx is unknown. SOCIAL HISTORY       Social History     Tobacco Use    Smoking status: Current Every Day Smoker     Packs/day: 1.00     Years: 30.00     Pack years: 30.00     Types: Cigarettes    Smokeless tobacco: Never Used   Substance Use Topics    Alcohol use: Yes     Alcohol/week: 0.0 standard drinks     Comment: sociably    Drug use: No     PHYSICAL EXAM     INITIAL VITALS: /87   Pulse 93   Temp 97.8 °F (36.6 °C)   Resp 16   Ht 5' 6\" (1.676 m)   Wt 110 lb (49.9 kg)   LMP  (LMP Unknown)   SpO2 100%   BMI 17.75 kg/m²    Physical Exam  Vitals signs reviewed. Constitutional:       General: She is not in acute distress. Appearance: Normal appearance. She is well-developed. She is not diaphoretic. HENT:      Head: Normocephalic and atraumatic. Right Ear: External ear normal.      Left Ear: External ear normal.      Nose: Nose normal. No congestion. Eyes:      General:         Right eye: No discharge. Left eye: No discharge.       Conjunctiva/sclera: Conjunctivae normal.      Pupils: Pupils are equal, round, and reactive to light. Neck:      Musculoskeletal: Normal range of motion and neck supple. Trachea: No tracheal deviation. Cardiovascular:      Rate and Rhythm: Normal rate and regular rhythm. Pulses: Normal pulses. Heart sounds: Normal heart sounds. Pulmonary:      Effort: Pulmonary effort is normal. No respiratory distress. Breath sounds: Normal breath sounds. No stridor. No wheezing or rales. Comments: Right lateral chest wall tenderness, no crepitus  Chest:      Chest wall: Tenderness present. Abdominal:      Palpations: Abdomen is soft. Tenderness: There is no abdominal tenderness. There is no guarding or rebound. Musculoskeletal: Normal range of motion. General: No tenderness or deformity. Skin:     General: Skin is warm and dry. Capillary Refill: Capillary refill takes less than 2 seconds. Findings: No erythema or rash. Neurological:      General: No focal deficit present. Mental Status: She is alert and oriented to person, place, and time. Cranial Nerves: No cranial nerve deficit. Coordination: Coordination normal.   Psychiatric:         Mood and Affect: Mood normal.         Behavior: Behavior normal.         Thought Content:  Thought content normal.         Judgment: Judgment normal.         MEDICAL DECISION MAKING:   Reproducible chest wall pain  Do not suspect PE or AMI or ACS  She has a hx of pleurodesis on the right side  I suspect pleurisy and costocondritis is causing her pain now  Do not suspect UTI or renal colic or AD or AAA  Do not suspect covid or pneumonia or ptx  I looked at the cxr, also see rad report  Discussed with patient anticipatory guidance, discharge instructions, follow up PCP 24 hours  Lidoderm, motrin, norco       Procedures    DIAGNOSTIC RESULTS     RADIOLOGY:All plain film, CT, MRI, and formal ultrasound images (except ED bedside ultrasound) are read by the radiologist, see reports below, unless otherwisenoted in MDM or here. XR CHEST PORTABLE   Final Result   No acute cardiopulmonary pathology. Findings suggestive of COPD. EMERGENCY DEPARTMENTCOURSE:         Vitals:    Vitals:    07/23/20 1240 07/23/20 1242   BP: 130/87    Pulse: 93    Resp: 16    Temp: 97.8 °F (36.6 °C)    SpO2: 100%    Weight:  110 lb (49.9 kg)   Height:  5' 6\" (1.676 m)       The patient was given the following medications while in the emergency department:  Orders Placed This Encounter   Medications    morphine sulfate (PF) injection 4 mg    lidocaine (LIDODERM) 5 %     Sig: Place 1 patch onto the skin daily 12 hours on, 12 hours off. Dispense:  30 patch     Refill:  0    ibuprofen (IBU) 600 MG tablet     Sig: Take 1 tablet by mouth every 6 hours as needed for Pain     Dispense:  120 tablet     Refill:  0    HYDROcodone-acetaminophen (NORCO) 5-325 MG per tablet     Sig: Take 1 tablet by mouth every 6 hours as needed for Pain for up to 3 days. Dispense:  12 tablet     Refill:  0    lidocaine 4 % external patch 1 patch       FINAL IMPRESSION      1. Rib pain on right side          DISPOSITION/PLAN   DISPOSITION Decision To Discharge 07/23/2020 02:18:39 PM      PATIENT REFERRED TO:  Chase Baires MD  [address] [place] [address]  [phone]    Schedule an appointment as soon as possible for a visit in 1 day      DISCHARGE MEDICATIONS:  New Prescriptions    HYDROCODONE-ACETAMINOPHEN (NORCO) 5-325 MG PER TABLET    Take 1 tablet by mouth every 6 hours as needed for Pain for up to 3 days. IBUPROFEN (IBU) 600 MG TABLET    Take 1 tablet by mouth every 6 hours as needed for Pain    LIDOCAINE (LIDODERM) 5 %    Place 1 patch onto the skin daily 12 hours on, 12 hours off.      Ketan Infante MD  Attending Emergency Physician                    Ketan Infante MD  07/23/20 [id number]

## 2020-07-24 ENCOUNTER — CARE COORDINATION (OUTPATIENT)
Dept: CARE COORDINATION | Age: 50
End: 2020-07-24

## 2020-07-24 NOTE — CARE COORDINATION
Patient contacted regarding recent discharge and COVID-19 risk. Discussed COVID-19 related testing which was not done at this time. Test results were not done. Patient informed of results, if available? Care Transition Nurse/ Ambulatory Care Manager contacted the patient by telephone to perform post discharge assessment. Verified name and  with patient as identifiers. Patient has following risk factors of: COPD, asthma and pneumonia. CTN/ACM reviewed discharge instructions, medical action plan and red flags related to discharge diagnosis. Reviewed and educated them on any new and changed medications related to discharge diagnosis. Advised obtaining a 90-day supply of all daily and as-needed medications. Education provided regarding infection prevention, and signs and symptoms of COVID-19 and when to seek medical attention with patient who verbalized understanding. Discussed exposure protocols and quarantine from [address] you at higher risk for severe illness  and given an opportunity for questions and concerns. The patient agrees to contact the COVID-19 hotline [phone] or PCP office for questions related to their healthcare. CTN/ACM provided contact information for future reference. From CDC: Are you at higher risk for severe illness?  Wash your hands often.  Avoid close contact (6 feet, which is about two arm lengths) with people who are sick.  Put distance between yourself and other people if COVID-19 is spreading in your community.  Clean and disinfect frequently touched surfaces.  Avoid all cruise travel and non-essential air travel.  Call your healthcare professional if you have concerns about COVID-19 and your underlying condition or if you are sick.     For more information on steps you can take to protect yourself, see CDC's How to Protect Yourself    Pt will be further monitored by COVID Loop Team based on severity of symptoms and risk factors. Patient referred for LOOP Program:   Patient's preferred e-mail:  [email or web address]  Patient's preferred phone number: [phone]  Care Plan: self monitoring  LOOP Provider: Lujan/Lima    Patient states she has just been laying around sleeping today. She has not called PCP for f/u appt yet. States she was told to call today or Monday. Encouraged her to call asap to get on the schedule.    patient enrolled in Loop program.

## 2020-07-24 NOTE — CARE COORDINATION
First outreach call to follow up with patient for follow up ED visit/COVID monitoring, no answer and no vm setup.     Ginger Guillaume, [facility] RN Care Manager  [phone]

## 2021-08-24 ENCOUNTER — TELEPHONE (OUTPATIENT)
Dept: FAMILY MEDICINE CLINIC | Age: 51
End: 2021-08-24

## 2021-08-24 DIAGNOSIS — J43.1 PANLOBULAR EMPHYSEMA (HCC): Primary | ICD-10-CM

## 2021-08-24 NOTE — TELEPHONE ENCOUNTER
Please advise to do and give her contact information to schedule PFTs and appointment with pulmonologist    Patient not seen since 7/6/2018, 3 years ago, needs an appointment with any of the providers as a new patient preferably with myself    Orders Placed This Encounter   Procedures    XR CHEST (2 VW)     Standing Status:   Future     Standing Expiration Date:   8/24/2022     Order Specific Question:   Reason for exam:     Answer:   Don Canas MD, Pulmonology, [place]     Referral Priority:   Routine     Referral Type:   Eval and Treat     Referral Reason:   Specialty Services Required     Referred to Provider:   Ben Moya MD     Requested Specialty:   Pulmonary Disease     Number of Visits Requested:   1    Full PFT Study With Bronchodilator     Standing Status:   Future     Standing Expiration Date:   8/24/2022     Scheduling Instructions:      Pre- and post bronchodilator       No future appointments. Thank you!

## 2021-08-24 NOTE — TELEPHONE ENCOUNTER
----- Message from [facility] sent at 8/24/2021  2:21 PM EDT -----  Subject: Referral Request    QUESTIONS   Reason for referral request? Patient needs a referral for a lung   specialist Dr Gilberto Parson is who she would like to see. Has the physician seen you for this condition before? No   Preferred Specialist (if applicable)? Do you already have an appointment scheduled? Additional Information for Provider?   ---------------------------------------------------------------------------  --------------  CALL BACK INFO  What is the best way for the office to contact you? OK to leave message on   voicemail  Preferred Call Back Phone Number?  [phone]

## 2021-08-25 NOTE — TELEPHONE ENCOUNTER
Noted. Thank you!     Future Appointments   Date Time Provider Yaneli Kimberly   9/16/2021 10:00 AM Kailyn Molina MD fp [facility]

## 2021-08-25 NOTE — TELEPHONE ENCOUNTER
Patient informed of xray, to schedule pft, to call pulmonologist to get an appointment, and I scheduled a virtual new patient appointment for her.

## 2021-09-10 ENCOUNTER — HOSPITAL ENCOUNTER (OUTPATIENT)
Dept: GENERAL RADIOLOGY | Age: 51
Discharge: HOME | End: 2021-09-12
Payer: COMMERCIAL

## 2021-09-10 ENCOUNTER — HOSPITAL ENCOUNTER (OUTPATIENT)
Dept: PULMONOLOGY | Age: 51
Discharge: HOME | End: 2021-09-10
Payer: COMMERCIAL

## 2021-09-10 ENCOUNTER — HOSPITAL ENCOUNTER (OUTPATIENT)
Age: 51
Discharge: HOME | End: 2021-09-12
Payer: COMMERCIAL

## 2021-09-10 DIAGNOSIS — J43.1 PANLOBULAR EMPHYSEMA (HCC): ICD-10-CM

## 2021-09-10 LAB
DLCO %PRED: NORMAL
DLCO PRED: NORMAL
DLCO/VA %PRED: NORMAL
DLCO/VA PRED: NORMAL
DLCO/VA: NORMAL
DLCO: NORMAL
EXPIRATORY TIME: NORMAL
FEF 25-75% %PRED-PRE: NORMAL
FEF 25-75% PRED: NORMAL
FEF 25-75%-PRE: NORMAL
FEV1 %PRED-PRE: NORMAL
FEV1 PRED: NORMAL
FEV1/FVC %PRED-PRE: NORMAL
FEV1/FVC PRED: NORMAL
FEV1/FVC: NORMAL
FEV1: NORMAL
FVC %PRED-PRE: NORMAL
FVC PRED: NORMAL
FVC: NORMAL
GAW %PRED: NORMAL
GAW PRED: NORMAL
GAW: NORMAL
IC %PRED: NORMAL
IC PRED: NORMAL
IC: NORMAL
MVV %PRED-PRE: NORMAL
MVV PRED: NORMAL
MVV-PRE: NORMAL
PEF %PRED-PRE: NORMAL
PEF PRED: NORMAL
PEF-PRE: NORMAL
RAW %PRED: NORMAL
RAW PRED: NORMAL
RAW: NORMAL
RV %PRED: NORMAL
RV PRED: NORMAL
RV: NORMAL
SVC %PRED: NORMAL
SVC PRED: NORMAL
SVC: NORMAL
TLC %PRED: NORMAL
TLC PRED: NORMAL
TLC: NORMAL
VA %PRED: NORMAL
VA PRED: NORMAL
VA: NORMAL
VTG %PRED: NORMAL
VTG PRED: NORMAL
VTG: NORMAL

## 2021-09-10 PROCEDURE — 94664 DEMO&/EVAL PT USE INHALER: CPT

## 2021-09-10 PROCEDURE — 94375 RESPIRATORY FLOW VOLUME LOOP: CPT

## 2021-09-10 PROCEDURE — 71046 X-RAY EXAM CHEST 2 VIEWS: CPT

## 2021-09-10 PROCEDURE — 94726 PLETHYSMOGRAPHY LUNG VOLUMES: CPT

## 2021-09-10 PROCEDURE — 94729 DIFFUSING CAPACITY: CPT

## 2021-09-10 PROCEDURE — 94060 EVALUATION OF WHEEZING: CPT

## 2021-09-10 NOTE — PROCEDURES
Results: FVC and FEV1 are decreased. FEV1 x-ray shows normal. There is no response to bronchodilators. Lung volumes are normal.Diffusing capacity is moderately decreased when corrected for alveolar ventilation.  Airways resistance is normal.    Impression: Mild to moderate obstructive lung disease consistent with COPD

## 2021-09-15 ASSESSMENT — PATIENT HEALTH QUESTIONNAIRE - PHQ9
1. LITTLE INTEREST OR PLEASURE IN DOING THINGS: 3
2. FEELING DOWN, DEPRESSED OR HOPELESS: 3
5. POOR APPETITE OR OVEREATING: 3
SUM OF ALL RESPONSES TO PHQ9 QUESTIONS 1 & 2: 6
6. FEELING BAD ABOUT YOURSELF - OR THAT YOU ARE A FAILURE OR HAVE LET YOURSELF OR YOUR FAMILY DOWN: 3
SUM OF ALL RESPONSES TO PHQ QUESTIONS 1-9: 21
8. MOVING OR SPEAKING SO SLOWLY THAT OTHER PEOPLE COULD HAVE NOTICED. OR THE OPPOSITE, BEING SO FIGETY OR RESTLESS THAT YOU HAVE BEEN MOVING AROUND A LOT MORE THAN USUAL: 0
4. FEELING TIRED OR HAVING LITTLE ENERGY: 3
7. TROUBLE CONCENTRATING ON THINGS, SUCH AS READING THE NEWSPAPER OR WATCHING TELEVISION: 3
3. TROUBLE FALLING OR STAYING ASLEEP: 3
10. IF YOU CHECKED OFF ANY PROBLEMS, HOW DIFFICULT HAVE THESE PROBLEMS MADE IT FOR YOU TO DO YOUR WORK, TAKE CARE OF THINGS AT HOME, OR GET ALONG WITH OTHER PEOPLE: 3
SUM OF ALL RESPONSES TO PHQ QUESTIONS 1-9: 21
SUM OF ALL RESPONSES TO PHQ QUESTIONS 1-9: 21
9. THOUGHTS THAT YOU WOULD BE BETTER OFF DEAD, OR OF HURTING YOURSELF: 0

## 2021-09-15 ASSESSMENT — COLUMBIA-SUICIDE SEVERITY RATING SCALE - C-SSRS
2. HAVE YOU ACTUALLY HAD ANY THOUGHTS OF KILLING YOURSELF?: NO
6. HAVE YOU EVER DONE ANYTHING, STARTED TO DO ANYTHING, OR PREPARED TO DO ANYTHING TO END YOUR LIFE?: NO
1. WITHIN THE PAST MONTH, HAVE YOU WISHED YOU WERE DEAD OR WISHED YOU COULD GO TO SLEEP AND NOT WAKE UP?: NO

## 2021-09-15 NOTE — PROGRESS NOTES
Visit Information    Have you changed or started any medications since your last visit including any over-the-counter medicines, vitamins, or herbal medicines? no   Are you having any side effects from any of your medications? -  no  Have you stopped taking any of your medications? Is so, why? -  no    Have you seen any other physician or provider since your last visit? No  Have you had any other diagnostic tests since your last visit? No  Have you been seen in the emergency room and/or had an admission to a hospital since we last saw you? No  Have you had your routine dental cleaning in the past 6 months? yes     Have you activated your [employer] account? If not, what are your barriers?  Yes     Patient Care Team:  Harris Moralez MD as PCP - Jeanette Oviedo MD as PCP - [facility]  Zack Giang MD as Surgeon (Orthopedic Surgery)  Dalia Kumar MD as Consulting Physician (Pulmonology)    Medical History Review  Past Medical, Family, and Social History reviewed and does contribute to the patient presenting condition    Health Maintenance   Topic Date Due    Hepatitis C screen  Never done    COVID-19 Vaccine (1) Never done    DTaP/Tdap/Td vaccine (1 - Tdap) Never done    Lipid screen  Never done    Colon cancer screen colonoscopy  Never done    Annual Wellness Visit (AWV)  Never done    Flu vaccine (1) 09/01/2021    Pneumococcal 0-64 years Vaccine (2 of 2 - PPSV23) 04/09/2037    HIV screen  Completed    Hepatitis A vaccine  Aged Out    Hepatitis B vaccine  Aged Out    Hib vaccine  Aged Out    Meningococcal (ACWY) vaccine  Aged Out

## 2021-09-16 ENCOUNTER — VIRTUAL VISIT (OUTPATIENT)
Dept: FAMILY MEDICINE CLINIC | Age: 51
End: 2021-09-16
Payer: COMMERCIAL

## 2021-09-16 DIAGNOSIS — R06.09 DOE (DYSPNEA ON EXERTION): Primary | ICD-10-CM

## 2021-09-16 DIAGNOSIS — R53.82 CHRONIC FATIGUE: ICD-10-CM

## 2021-09-16 DIAGNOSIS — Z51.81 MEDICATION MONITORING ENCOUNTER: ICD-10-CM

## 2021-09-16 DIAGNOSIS — R93.89 ABNORMAL CXR: ICD-10-CM

## 2021-09-16 DIAGNOSIS — F33.3 SEVERE EPISODE OF RECURRENT MAJOR DEPRESSIVE DISORDER, WITH PSYCHOTIC FEATURES (HCC): ICD-10-CM

## 2021-09-16 DIAGNOSIS — Z13.6 SCREENING FOR CARDIOVASCULAR CONDITION: ICD-10-CM

## 2021-09-16 DIAGNOSIS — Z11.59 ENCOUNTER FOR SCREENING FOR OTHER VIRAL DISEASES: ICD-10-CM

## 2021-09-16 DIAGNOSIS — Z12.11 SCREEN FOR COLON CANCER: ICD-10-CM

## 2021-09-16 DIAGNOSIS — Z12.31 ENCOUNTER FOR SCREENING MAMMOGRAM FOR BREAST CANCER: ICD-10-CM

## 2021-09-16 DIAGNOSIS — J43.1 PANLOBULAR EMPHYSEMA (HCC): ICD-10-CM

## 2021-09-16 PROCEDURE — G8427 DOCREV CUR MEDS BY ELIG CLIN: HCPCS | Performed by: FAMILY MEDICINE

## 2021-09-16 PROCEDURE — 99204 OFFICE O/P NEW MOD 45 MIN: CPT | Performed by: FAMILY MEDICINE

## 2021-09-16 RX ORDER — TIOTROPIUM BROMIDE 18 UG/1
18 CAPSULE ORAL; RESPIRATORY (INHALATION) DAILY
Qty: 90 CAPSULE | Refills: 1 | Status: SHIPPED | OUTPATIENT
Start: 2021-09-16 | End: 2021-12-16 | Stop reason: ALTCHOICE

## 2021-09-16 RX ORDER — ALBUTEROL SULFATE 90 UG/1
2 AEROSOL, METERED RESPIRATORY (INHALATION) EVERY 6 HOURS PRN
Qty: 18 G | Refills: 1 | Status: SHIPPED | OUTPATIENT
Start: 2021-09-16 | End: 2021-12-20 | Stop reason: SDUPTHER

## 2021-09-16 ASSESSMENT — ENCOUNTER SYMPTOMS
ABDOMINAL DISTENTION: 0
NAUSEA: 0
ABDOMINAL PAIN: 0
SHORTNESS OF BREATH: 1
COUGH: 1
DIARRHEA: 0
CONSTIPATION: 1
CHEST TIGHTNESS: 0
VOMITING: 0
BACK PAIN: 1
WHEEZING: 0

## 2021-09-16 NOTE — PATIENT INSTRUCTIONS
Patient Education        Stopping Smoking: Care Instructions  Your Care Instructions     Cigarette smokers crave the nicotine in cigarettes. Giving it up is much harder than simply changing a habit. Your body has to stop craving the nicotine. It is hard to quit, but you can do it. There are many tools that people use to quit smoking. You may find that combining tools works best for you. There are several steps to quitting. First you get ready to quit. Then you get support to help you. After that, you learn new skills and behaviors to become a nonsmoker. For many people, a necessary step is getting and using medicine. Your doctor will help you set up the plan that best meets your needs. You may want to attend a smoking cessation program to help you quit smoking. When you choose a program, look for one that has proven success. Ask your doctor for ideas. You will greatly increase your chances of success if you take medicine as well as get counseling or join a cessation program.  Some of the changes you feel when you first quit tobacco are uncomfortable. Your body will miss the nicotine at first, and you may feel short-tempered and grumpy. You may have trouble sleeping or concentrating. Medicine can help you deal with these symptoms. You may struggle with changing your smoking habits and rituals. The last step is the tricky one: Be prepared for the smoking urge to continue for a time. This is a lot to deal with, but keep at it. You will feel better. Follow-up care is a key part of your treatment and safety. Be sure to make and go to all appointments, and call your doctor if you are having problems. It's also a good idea to know your test results and keep a list of the medicines you take. How can you care for yourself at home? · Ask your family, friends, and coworkers for support. You have a better chance of quitting if you have help and support.   · Join a support group, such as Nicotine Anonymous, for people who are trying to quit smoking. · Consider signing up for a smoking cessation program, such as the American Lung Association's Freedom from Smoking program.  · Get text messaging support. Go to the website at www.smokefree. gov to sign up for the [facility] program.  · Set a quit date. Pick your date carefully so that it is not right in the middle of a big deadline or stressful time. Once you quit, do not even take a puff. Get rid of all ashtrays and lighters after your last cigarette. Clean your house and your clothes so that they do not smell of smoke. · Learn how to be a nonsmoker. Think about ways you can avoid those things that make you reach for a cigarette. ? Avoid situations that put you at greatest risk for smoking. For some people, it is hard to have a drink with friends without smoking. For others, they might skip a coffee break with coworkers who smoke. ? Change your daily routine. Take a different route to work or eat a meal in a different place. · Cut down on stress. Calm yourself or release tension by doing an activity you enjoy, such as reading a book, taking a hot bath, or gardening. · Talk to your doctor or pharmacist about nicotine replacement therapy, which replaces the nicotine in your body. You still get nicotine but you do not use tobacco. Nicotine replacement products help you slowly reduce the amount of nicotine you need. These products come in several forms, many of them available over-the-counter:  ? Nicotine patches  ? Nicotine gum and lozenges  ? Nicotine inhaler  · Ask your doctor about bupropion (Wellbutrin) or varenicline (Chantix), which are prescription medicines. They do not contain nicotine. They help you by reducing withdrawal symptoms, such as stress and anxiety. · Some people find hypnosis, acupuncture, and massage helpful for ending the smoking habit. · Eat a healthy diet and get regular exercise.  Having healthy habits will help your body move past its craving for nicotine. · Be prepared to keep trying. Most people are not successful the first few times they try to quit. Do not get mad at yourself if you smoke again. Make a list of things you learned and think about when you want to try again, such as next week, next month, or next year. Where can you learn more? Go to https://chpepiceweb.[employer]. org and sign in to your [employer] account. Enter O286 in the [employer] box to learn more about \"Stopping Smoking: Care Instructions. \"     If you do not have an account, please click on the \"Sign Up Now\" link. Current as of: February 11, 2021               Content Version: 12.9  © 2006-2021 [employer]. Care instructions adapted under license by [facility] ([facility]). If you have questions about a medical condition or this instruction, always ask your healthcare professional. [place] [address] any warranty or liability for your use of this information. Patient Education        Chronic Obstructive Pulmonary Disease (COPD): Care Instructions  Your Care Instructions     Chronic obstructive pulmonary disease (COPD) is a general term for a group of lung diseases, including emphysema and chronic bronchitis. People with COPD have decreased airflow in and out of the lungs, which makes it hard to breathe. The airways also can get clogged with thick mucus. Cigarette smoking is a major cause of COPD. Although there is no cure for COPD, you can slow its progress. Following your treatment plan and taking care of yourself can help you feel better and live longer. Follow-up care is a key part of your treatment and safety. Be sure to make and go to all appointments, and call your doctor if you are having problems. It's also a good idea to know your test results and keep a list of the medicines you take. How can you care for yourself at home? Staying healthy    · Do not smoke.  This is the most important step you can take to prevent more damage to your lungs. If you need help quitting, talk to your doctor about stop-smoking programs and medicines. These can increase your chances of quitting for good.     · Avoid colds and flu. Get a pneumococcal vaccine shot. If you have had one before, ask your doctor whether you need a second dose. Get the flu vaccine every fall. If you must be around people with colds or the flu, wash your hands often.     · Avoid secondhand smoke, air pollution, and high altitudes. Also avoid cold, dry air and hot, humid air. Stay at home with your windows closed when air pollution is bad. Medicines and oxygen therapy    · Take your medicines exactly as prescribed. Call your doctor if you think you are having a problem with your medicine. You may be taking medicines such as:  ? Bronchodilators. These help open your airways and make breathing easier. They are either short-acting (work for 6 to 9 hours) or long-acting (work for 24 hours). You inhale most bronchodilators, so they start to act quickly. Always carry your quick-relief inhaler with you in case you need it while you are away from home. ? Corticosteroids (prednisone, budesonide). These reduce airway inflammation. They come in pill or inhaled form. You must take these medicines every day for them to work well.     · Ask your doctor or pharmacist if a spacer is right for you. A spacer may help you get more inhaled medicine to your lungs. If you use one, ask how to use it properly.     · Do not take any vitamins, over-the-counter medicine, or herbal products without talking to your doctor first.     · If your doctor prescribed antibiotics, take them as directed. Do not stop taking them just because you feel better. You need to take the full course of antibiotics.     · If you use oxygen therapy, use the flow rate your doctor has recommended.  Don't change it without talking to your doctor first. Oxygen therapy boosts the amount of oxygen in your blood and helps you breathe easier. Activity    · Get regular exercise. Walking is an easy way to get exercise. Start out slowly, and walk a little more each day.     · Pay attention to your breathing. You are exercising too hard if you can't talk while you exercise.     · Take short rest breaks when doing household chores and other activities.     · Learn breathing methodssuch as breathing through pursed lipsto help you become less short of breath.     · If your doctor has not set you up with a pulmonary rehabilitation program, ask if rehab is right for you. Rehab includes exercise programs, education about your disease and how to manage it, help with diet and other changes, and emotional support. Diet    · Eat regular, healthy meals. Use bronchodilators about 1 hour before you eat to make it easier to eat. Eat several small meals instead of three large ones. Drink beverages at the end of the meal. Avoid foods that are hard to chew.     · Eat foods that contain protein so you don't lose muscle mass.     · Talk with your doctor if you gain too much weight or if you lose weight without trying. Mental health    · Talk to your family, friends, or a therapist about your feelings. Some people feel frightened, angry, hopeless, helpless, and even guilty. Talking openly about bad feelings can help you cope. If these feelings last, talk to your doctor. When should you call for help? Call 911 anytime you think you may need emergency care. For example, call if:    · You have severe trouble breathing. Call your doctor now or seek immediate medical care if:    · You have new or worse trouble breathing.     · You cough up blood.     · You have a fever.    Watch closely for changes in your health, and be sure to contact your doctor if:    · You cough more deeply or more often, especially if you notice more mucus or a change in the color of your mucus.     · You have new or worse swelling in your legs or belly.     · You are not getting better as expected. Where can you learn more? Go to https://chpepiceweb.[employer]. org and sign in to your [employer] account. Enter L253 in the [facility] box to learn more about \"Chronic Obstructive Pulmonary Disease (COPD): Care Instructions. \"     If you do not have an account, please click on the \"Sign Up Now\" link. Current as of: October 26, 2020               Content Version: 12.9  © 2006-2021 [employer]. Care instructions adapted under license by [facility] ([facility]). If you have questions about a medical condition or this instruction, always ask your healthcare professional. [address] any warranty or liability for your use of this information.

## 2021-09-16 NOTE — PROGRESS NOTES
2021    TELEHEALTH EVALUATION -- Audio/Visual (During GAGII-52 public health emergency)      Cassandra Campos (:  1970) is a 54 y.o. female,New patient, here for evaluation of the following chief complaint(s): New Patient (Reestablishing, was last seen 2018, more than 3 years ago), COPD, Back Pain, Fatigue, and Depression  was last seen more than 3 years ago, on 2018, will be new patient        ASSESSMENT/PLAN:    1. WARREN (dyspnea on exertion)  worsening  recent CXR abnormal, we need to rule out malignancy, also cardiac disease  Has appointment with pulmonologist  Final reading of PFTs pending  To start inhalers for emphysema  -     CT CHEST W CONTRAST; Future  -     EKG 12 Lead; Future  2. Panlobular emphysema (HCC)  Worsening  -     CT CHEST W CONTRAST; Future  -To start albuterol sulfate HFA (PROVENTIL HFA) 108 (90 Base) MCG/ACT inhaler; Inhale 2 puffs into the lungs every 6 hours as needed for Wheezing or Shortness of Breath, Disp-18 g, R-1Normal  -To start   tiotropium (SPIRIVA HANDIHALER) 18 MCG inhalation capsule; Inhale 1 capsule into the lungs daily, Disp-90 capsule, R-1Normal  Counseling given for smoking cessation  3. Chronic fatigue  Worsening  Will do basic labs to rule out certain common medical conditions: hematologic, renal, hepatic, electrolyte imbalances, thyroid disorders. -     Comprehensive Metabolic Panel; Future  -     CBC; Future  -     TSH without Reflex; Future  4. Severe episode of recurrent major depressive disorder, with psychotic features ([facility] [address]  Worsening  Continue current treatment and follow up with psychiatrist and psychologist as scheduled. Patient had recent adjustment of Seroquel    5. Abnormal CXR  -     CT CHEST W CONTRAST; Future  6. Medication monitoring encounter--  Patient is on very high dosage of Seroquel and trazodone, will need to do an EKG    -     EKG 12 Lead; Future  7. Screen for colon cancer  -     Cologuard; Future  8.  Encounter for screening mammogram for breast cancer  -     [facility] ZANDER DIGITAL SCREEN BILATERAL; Future  9. Encounter for screening for other viral diseases  -     Hepatitis C Antibody; Future  10. Screening for cardiovascular condition  -     Lipid Panel; Future    Patient advised to get COVID-19 vaccine at the pharmacy  All the instructions were given and she wrote instructions to schedule testing  After she completes the COVID-19 vaccination, then she will get flu shot      Strongly advised to schedule an appointment with pulmonologist ASAP and will start the inhalers, smoking cessation advised  Pending pulmonary function test reading     Follow-up with psychiatrist      Kait Guy received counseling on the following healthy behaviors: nutrition, exercise, medication adherence and tobacco cessation  Reviewed prior labs and health maintenance  Discussed use, benefit, and side effects of prescribed medications. Barriers to medication compliance addressed. Patient given educational materials - see patient instructions  All patient questions answered. Patient voiced understanding. The patient's past medical,surgical, social, and family history as well as her current medications and allergies were reviewed as documented in today's encounter. Medications, labs, diagnostic studies, consultations and follow-up as documented in this encounter. Return in about 3 months (around 2021) for [place] for virtual appointment, COPD, LABS F/U.     Data Unavailable    Future Appointments   Date Time Provider Yaneli Harris   2021  7:30 AM ST DIGITAL RM [facility]Z MAMMO [facility] Radiolog   2021  8:45 AM ST CT RM 1 STCZ CT SCAN [facility] Radiolog   2021  9:30 AM MD jacek Faye sc [facility]         SUBJECTIVE/OBJECTIVE:  Shahana Dias (:  1970) has requested an audio/video evaluation for the following concern(s):New Patient (Reestablishing, was last seen 2018, more than 3 years ago), COPD, Back Pain, Fatigue, and Depression      COPD: She feels shortness of breath is getting worse   Current treatment includes nothing. climbing up the stairs with laundry one flight ,does not make her short of breath   Residual symptoms: chronic dyspnea, non-productive cough and cough productive of gray sputum in small amounts. She denies chest pain. She denies weight loss, she actually gained weight    Had PFTs and CXR    Nicotine dependence. Smoker, counseling given to quit smoking. Less 1 PPD, has been cutting down  Ready to quit: No  Counseling given: Yes    Chest x-ray done on 9/10/2021 discussed in detail, increasing scarring and volume loss possible lung nodule, chronic emphysema and fibrosis recommend a CT chest by radiologist, she is agreeable          Chronic emphysematous and fibrotic changes with a more focal opacity in the   right apex.  While this may be related to progressive scarring/volume loss,   lung nodule is not excluded.  Dedicated chest CT recommended for further   evaluation.           Fatigue  \"I have no energy for 3 yrs\", worsening  Eugenie complains of back pain from to neck to lower back  from stress she says. Denies fever, chills, night sweats. Denies cold or heat intolerance, dry skin, constipation. Patient reports an intentional weight gain of 20 pounds in the past 3 years  She also reports constipation, denies blood in stool. Depression  She says she goes to [facility] on [address]  She takes Seroquel 200 mg BID, 400 mg qhs, recently increased. She says hallucinations are better since the medication was increased  She is also taking Lexapro, trazodone and given Benadryl for anxiety  PHQ-2 Over the past 2 weeks, how often have you been bothered by any of the following problems?   Little interest or pleasure in doing things: Nearly every day  Feeling down, depressed, or hopeless: Nearly every day  PHQ-2 Score: 6  PHQ-9 Over the past 2 weeks, how often have you been bothered by any of the following problems? Trouble falling or staying asleep, or sleeping too much: Nearly every day  Feeling tired or having little energy: Nearly every day  Poor appetite or overeating: Nearly every day  Feeling bad about yourself - or that you are a failure or have let yourself or your family down: Nearly every day  Trouble concentrating on things, such as reading the newspaper or watching television: Nearly every day  Moving or speaking so slowly that other people could have noticed. Or the opposite - being so fidgety or restless that you have been moving around a lot more than usual: Not at all  Thoughts that you would be better off dead, or of hurting yourself in some way: Not at all  If you checked off any problems, how difficult have these problems made it for you to do your work, take care of things at home, or get along with other people?: Extremely dIfficult  PHQ-9 Total Score: 21      AMB C-SSRS Suicide Screening     1) Within the past month, have you wished you were dead or wished you could go to sleep and not wake up? NO   2) Have you actually had any thoughts of killing yourself? NO   6) Have you ever done anything, started to do anything, or prepared to do anything to end your life? NO     Severe depression  PHQ Scores 9/15/2021 7/6/2018 8/13/2013   PHQ2 Score 6 3 -   PHQ9 Score 21 10 16     Patient is due for colon cancer screening. Lele Baker denies  nausea, vomiting, diarrhea, blood in the stool or abdominal pain. We discussed options, she would like to have: Cologuard. She is due for Mammogram.   Denies breast pain, lumps or nipple discharge. She declines breast exam today. Due for lipids screening. Due to age, Lele Baker is due for lipids screening. Lele Baker is not eating low fat diet. she is not exercising. she is not taking any over the counter supplements. Patient is due for hepatitis C screening. Lele Baker 's indication is CDC recommendation.         Review of Systems   Constitutional: Positive for fatigue allergic reaction Yes [place] December, DO   clonazePAM (KLONOPIN) 0.5 MG tablet Take 0.5 mg by mouth daily as needed for Anxiety  Yes Historical Provider, MD       Social History     Tobacco Use    Smoking status: Current Every Day Smoker     Packs/day: 1.00     Years: 30.00     Pack years: 30.00     Types: Cigarettes    Smokeless tobacco: Never Used   Substance Use Topics    Alcohol use: Yes     Alcohol/week: 0.0 standard drinks     Comment: sociably    Drug use: No          PHYSICAL EXAMINATION:    Vital Signs: (As obtained by patient/caregiver or practitioner observation)  -vital signs stable and within normal limits except mildly underweight per BMI, prior BMI 17.75 kg/M2  Patient-Reported Vitals 9/15/2021   Patient-Reported Weight 110 lbs   Patient-Reported Height 5 ft 6 in               Constitutional: [] Appears well-developed and well-nourished [x] No apparent distress      [x] Abnormal-thin lady      Mental status  [x] Alert and awake  [x] Oriented to person/place/time [x]Able to follow commands      Eyes:  EOM    [x]  Normal  [] Abnormal-  Sclera  [x]  Normal  [] Abnormal -         Discharge [x]  None visible  [] Abnormal -    HENT:   [x] Normocephalic, atraumatic.   [] Abnormal   [x] Mouth/Throat: Mucous membranes are moist.     External Ears [x] Normal  [] Abnormal-     Neck: [x] No visualized mass     Pulmonary/Chest: [x] Respiratory effort normal.  [x] No visualized signs of difficulty breathing or respiratory distress        [x]I did not notice any cough or shortness of breath during the visit       Musculoskeletal:   [x] Normal gait with no signs of ataxia         [x] Normal range of motion of neck        [] Abnormal-       Neurological:        [x] No Facial Asymmetry (Cranial nerve 7 motor function) (limited exam to video visit)          [x] No gaze palsy        [] Abnormal-            Skin:        [x] No significant exanthematous lesions or discoloration noted on facial skin         [] Abnormal-            Psychiatric:      [x] No Hallucinations       []Mood is normal      [x]Behavior is normal      [x]Judgment is normal      [x]Thought content is normal       [x] Abnormal-anxious, depressed  Other pertinent observable physical exam findings-she says she still hears things but better since the medications were increased 1 month ago    Due to this being a TeleHealth encounter, evaluation of the following organ systems is limited: Vitals/Constitutional/EENT/Resp/CV/GI//MS/Neuro/Skin/Heme-Lymph-Imm. Orders Placed This Encounter   Medications    albuterol sulfate HFA (PROVENTIL HFA) 108 (90 Base) MCG/ACT inhaler     Sig: Inhale 2 puffs into the lungs every 6 hours as needed for Wheezing or Shortness of Breath     Dispense:  18 g     Refill:  1    tiotropium (SPIRIVA HANDIHALER) 18 MCG inhalation capsule     Sig: Inhale 1 capsule into the lungs daily     Dispense:  90 capsule     Refill:  1       Orders Placed This Encounter   Procedures    [employer]     This test is performed by an external laboratory and is used for result attachment only. It is required that this order requisition be faxed to: [facility] @@ [phone]. See [email or web address] for further information.      Standing Status:   Future     Standing Expiration Date:   9/15/2022    CT CHEST W CONTRAST     BUN/Creatinine Per Radiologist Protocol     Standing Status:   Future     Standing Expiration Date:   9/16/2022     Order Specific Question:   Reason for exam:     Answer:   possible nodule on CXR 9/10/21    TD ZANDER DIGITAL SCREEN BILATERAL     Standing Status:   Future     Standing Expiration Date:   11/16/2022    Hepatitis C Antibody     Standing Status:   Future     Standing Expiration Date:   11/5/2021    Comprehensive Metabolic Panel     Standing Status:   Future     Standing Expiration Date:   11/5/2021    CBC     Standing Status:   Future     Standing Expiration Date:   11/5/2021    TSH without Reflex Standing Status:   Future     Standing Expiration Date:   11/5/2021    Lipid Panel     Standing Status:   Future     Standing Expiration Date:   11/5/2021     Order Specific Question:   Is Patient Fasting?/# of Hours     Answer:   8-10 Hours, water ok to drink    EKG 12 Lead     Standing Status:   Future     Standing Expiration Date:   9/16/2022     Scheduling Instructions:      Medication monitoring     Order Specific Question:   Reason for Exam?     Answer: Other       Medications Discontinued During This Encounter   Medication Reason    dicyclomine (BENTYL) 10 MG capsule Patient Choice    lidocaine (LIDODERM) 5 % Patient Choice    magnesium citrate solution Patient Choice    mirtazapine (REMERON) 30 MG tablet Patient Choice    ondansetron (ZOFRAN ODT) 4 MG disintegrating tablet Patient Choice              Erica Ramirez, was evaluated through a synchronous (real-time) audio-video encounter. The patient (or guardian if applicable) is aware that this is a billable service. Verbal consent to proceed has been obtained within the past 12 months. The visit was conducted pursuant to the emergency declaration under the [address] [place] [address] and the [employer] and [employer] Act. Patient identification was verified    Patient was alone   The patient was located in a state where the provider was credentialed to provide care. On this date 9/16/2021 I have spent 35 minutes reviewing previous notes, test results and face to face with the patient discussing the diagnosis and importance of compliance with the treatment plan as well as documenting on the day of the visit. --Lokesh Regalado MD on 9/19/2021 at 10:44 PM    An electronic signature was used to authenticate this note.

## 2021-09-27 ENCOUNTER — HOSPITAL ENCOUNTER (OUTPATIENT)
Age: 51
Discharge: HOME | End: 2021-09-27
Payer: COMMERCIAL

## 2021-09-27 ENCOUNTER — HOSPITAL ENCOUNTER (OUTPATIENT)
Dept: WOMENS IMAGING | Age: 51
Discharge: HOME | End: 2021-09-29
Payer: COMMERCIAL

## 2021-09-27 ENCOUNTER — HOSPITAL ENCOUNTER (OUTPATIENT)
Dept: CT IMAGING | Age: 51
Discharge: HOME | End: 2021-09-29
Payer: COMMERCIAL

## 2021-09-27 DIAGNOSIS — J43.1 PANLOBULAR EMPHYSEMA (HCC): ICD-10-CM

## 2021-09-27 DIAGNOSIS — Z12.31 ENCOUNTER FOR SCREENING MAMMOGRAM FOR BREAST CANCER: ICD-10-CM

## 2021-09-27 DIAGNOSIS — R92.8 ABNORMAL MAMMOGRAM OF RIGHT BREAST: Primary | ICD-10-CM

## 2021-09-27 DIAGNOSIS — N17.9 ACUTE KIDNEY INJURY (HCC): ICD-10-CM

## 2021-09-27 DIAGNOSIS — R53.82 CHRONIC FATIGUE: ICD-10-CM

## 2021-09-27 DIAGNOSIS — Z11.59 ENCOUNTER FOR SCREENING FOR OTHER VIRAL DISEASES: ICD-10-CM

## 2021-09-27 DIAGNOSIS — R93.89 ABNORMAL CXR: ICD-10-CM

## 2021-09-27 DIAGNOSIS — E78.5 HYPERLIPIDEMIA WITH TARGET LDL LESS THAN 100: Primary | ICD-10-CM

## 2021-09-27 DIAGNOSIS — R06.09 DOE (DYSPNEA ON EXERTION): ICD-10-CM

## 2021-09-27 DIAGNOSIS — Z13.6 SCREENING FOR CARDIOVASCULAR CONDITION: ICD-10-CM

## 2021-09-27 LAB
ALBUMIN SERPL-MCNC: 4.7 G/DL (ref 3.5–5.2)
ALBUMIN/GLOBULIN RATIO: ABNORMAL (ref 1–2.5)
ALP BLD-CCNC: 89 U/L (ref 35–104)
ALT SERPL-CCNC: 6 U/L (ref 5–33)
ANION GAP SERPL CALCULATED.3IONS-SCNC: 12 MMOL/L (ref 9–17)
AST SERPL-CCNC: 15 U/L
BILIRUB SERPL-MCNC: 0.22 MG/DL (ref 0.3–1.2)
BUN BLDV-MCNC: 17 MG/DL (ref 6–20)
BUN/CREAT BLD: ABNORMAL (ref 9–20)
CALCIUM SERPL-MCNC: 9.5 MG/DL (ref 8.6–10.4)
CHLORIDE BLD-SCNC: 105 MMOL/L (ref 98–107)
CHOLESTEROL/HDL RATIO: 5.1
CHOLESTEROL: 273 MG/DL
CO2: 23 MMOL/L (ref 20–31)
CREAT SERPL-MCNC: 1.15 MG/DL (ref 0.5–0.9)
GFR AFRICAN AMERICAN: >60 ML/MIN
GFR NON-AFRICAN AMERICAN: 50 ML/MIN
GFR SERPL CREATININE-BSD FRML MDRD: ABNORMAL ML/MIN/{1.73_M2}
GFR SERPL CREATININE-BSD FRML MDRD: ABNORMAL ML/MIN/{1.73_M2}
GLUCOSE BLD-MCNC: 101 MG/DL (ref 70–99)
HCT VFR BLD CALC: 36.8 % (ref 36–46)
HDLC SERPL-MCNC: 54 MG/DL
HEMOGLOBIN: 12.1 G/DL (ref 12–16)
HEPATITIS C ANTIBODY: NONREACTIVE
LDL CHOLESTEROL: 194 MG/DL (ref 0–130)
MCH RBC QN AUTO: 28.8 PG (ref 26–34)
MCHC RBC AUTO-ENTMCNC: 32.7 G/DL (ref 31–37)
MCV RBC AUTO: 88.1 FL (ref 80–100)
NRBC AUTOMATED: NORMAL PER 100 WBC
PDW BLD-RTO: 14.4 % (ref 11.5–14.9)
PLATELET # BLD: 242 K/UL (ref 150–450)
PMV BLD AUTO: 7.2 FL (ref 6–12)
POTASSIUM SERPL-SCNC: 3.9 MMOL/L (ref 3.7–5.3)
RBC # BLD: 4.18 M/UL (ref 4–5.2)
SODIUM BLD-SCNC: 140 MMOL/L (ref 135–144)
TOTAL PROTEIN: 7.9 G/DL (ref 6.4–8.3)
TRIGL SERPL-MCNC: 126 MG/DL
TSH SERPL DL<=0.05 MIU/L-ACNC: 3.25 MIU/L (ref 0.3–5)
VLDLC SERPL CALC-MCNC: ABNORMAL MG/DL (ref 1–30)
WBC # BLD: 6.4 K/UL (ref 3.5–11)

## 2021-09-27 PROCEDURE — 80053 COMPREHEN METABOLIC PANEL: CPT

## 2021-09-27 PROCEDURE — 6360000004 HC RX CONTRAST MEDICATION: Performed by: FAMILY MEDICINE

## 2021-09-27 PROCEDURE — 84443 ASSAY THYROID STIM HORMONE: CPT

## 2021-09-27 PROCEDURE — 85027 COMPLETE CBC AUTOMATED: CPT

## 2021-09-27 PROCEDURE — 71260 CT THORAX DX C+: CPT

## 2021-09-27 PROCEDURE — 80061 LIPID PANEL: CPT

## 2021-09-27 PROCEDURE — 77063 BREAST TOMOSYNTHESIS BI: CPT

## 2021-09-27 PROCEDURE — 86803 HEPATITIS C AB TEST: CPT

## 2021-09-27 PROCEDURE — 36415 COLL VENOUS BLD VENIPUNCTURE: CPT

## 2021-09-27 PROCEDURE — 2580000003 HC RX 258: Performed by: FAMILY MEDICINE

## 2021-09-27 RX ORDER — 0.9 % SODIUM CHLORIDE 0.9 %
80 INTRAVENOUS SOLUTION INTRAVENOUS ONCE
Status: COMPLETED | OUTPATIENT
Start: 2021-09-27 | End: 2021-09-27

## 2021-09-27 RX ORDER — ROSUVASTATIN CALCIUM 10 MG/1
10 TABLET, COATED ORAL NIGHTLY
Qty: 30 TABLET | Refills: 3 | Status: SHIPPED | OUTPATIENT
Start: 2021-09-27 | End: 2022-03-14 | Stop reason: SDUPTHER

## 2021-09-27 RX ADMIN — SODIUM CHLORIDE 80 ML: 9 INJECTION, SOLUTION INTRAVENOUS at 09:09

## 2021-09-27 RX ADMIN — IOPAMIDOL 75 ML: 755 INJECTION, SOLUTION INTRAVENOUS at 09:09

## 2021-09-27 NOTE — RESULT ENCOUNTER NOTE
Please notify patient. Very high lipids, she would benefit from Crestor which is a statin to improve her lipids and decrease the risk of strokes and heart attacks. Mild dehydration with creatinine mildly increased, should drink 8 x 8 ounce glasses of water every day. Kidney function is decreased, should absolutely avoid ibuprofen, naproxen, Aleve, or Motrin. Repeat basic metabolic profile in 1 week, new order placed   blood glucose mildly increased 101, low-carb diet advised. Otherwise labs within normal limits  Continue current treatment.     Future Appointments  12/16/2021 9:30 AM    Kailyn Molina MD     fp [facility]

## 2021-09-27 NOTE — RESULT ENCOUNTER NOTE
Noted  Future Appointments  12/16/2021 9:30 AM    Nona Lopez MD     [facility]

## 2021-09-27 NOTE — RESULT ENCOUNTER NOTE
Please notify patient, Crestor sent to the pharmacy    Future Appointments  12/16/2021 9:30 AM    Mal[facility] Louie [facility]     [facility]

## 2021-09-27 NOTE — RESULT ENCOUNTER NOTE
Please notify patient.   Pleural calcifications, possible asbestos exposure, emphysema, no suspicious lung nodule    To continue to work on smoking cessation    Future Appointments  12/16/2021 9:30 AM    MD jacek Faye               [facility]

## 2021-11-17 ENCOUNTER — OFFICE VISIT (OUTPATIENT)
Dept: PULMONOLOGY | Age: 51
End: 2021-11-17
Payer: COMMERCIAL

## 2021-11-17 VITALS
WEIGHT: 110 LBS | OXYGEN SATURATION: 99 % | TEMPERATURE: 96.6 F | RESPIRATION RATE: 18 BRPM | BODY MASS INDEX: 17.68 KG/M2 | DIASTOLIC BLOOD PRESSURE: 69 MMHG | HEART RATE: 98 BPM | HEIGHT: 66 IN | SYSTOLIC BLOOD PRESSURE: 96 MMHG

## 2021-11-17 DIAGNOSIS — F60.3 BORDERLINE PERSONALITY DISORDER (HCC): ICD-10-CM

## 2021-11-17 DIAGNOSIS — F40.01 PANIC DISORDER WITH AGORAPHOBIA AND SEVERE PANIC ATTACKS: ICD-10-CM

## 2021-11-17 DIAGNOSIS — F33.9 EPISODE OF RECURRENT MAJOR DEPRESSIVE DISORDER, UNSPECIFIED DEPRESSION EPISODE SEVERITY (HCC): ICD-10-CM

## 2021-11-17 DIAGNOSIS — J44.9 CHRONIC OBSTRUCTIVE PULMONARY DISEASE, UNSPECIFIED COPD TYPE (HCC): Primary | ICD-10-CM

## 2021-11-17 PROCEDURE — G8484 FLU IMMUNIZE NO ADMIN: HCPCS | Performed by: INTERNAL MEDICINE

## 2021-11-17 PROCEDURE — G8926 SPIRO NO PERF OR DOC: HCPCS | Performed by: INTERNAL MEDICINE

## 2021-11-17 PROCEDURE — 99204 OFFICE O/P NEW MOD 45 MIN: CPT | Performed by: INTERNAL MEDICINE

## 2021-11-17 PROCEDURE — G8419 CALC BMI OUT NRM PARAM NOF/U: HCPCS | Performed by: INTERNAL MEDICINE

## 2021-11-17 PROCEDURE — G8427 DOCREV CUR MEDS BY ELIG CLIN: HCPCS | Performed by: INTERNAL MEDICINE

## 2021-11-17 PROCEDURE — 3023F SPIROM DOC REV: CPT | Performed by: INTERNAL MEDICINE

## 2021-11-17 PROCEDURE — 4004F PT TOBACCO SCREEN RCVD TLK: CPT | Performed by: INTERNAL MEDICINE

## 2021-11-17 RX ORDER — FLUTICASONE FUROATE, UMECLIDINIUM BROMIDE AND VILANTEROL TRIFENATATE 200; 62.5; 25 UG/1; UG/1; UG/1
1 POWDER RESPIRATORY (INHALATION) DAILY
Qty: 1 EACH | Refills: 3 | Status: SHIPPED | OUTPATIENT
Start: 2021-11-17 | End: 2021-12-16 | Stop reason: SDUPTHER

## 2021-11-17 ASSESSMENT — SLEEP AND FATIGUE QUESTIONNAIRES
HOW LIKELY ARE YOU TO NOD OFF OR FALL ASLEEP WHILE SITTING AND READING: 0
HOW LIKELY ARE YOU TO NOD OFF OR FALL ASLEEP WHILE SITTING INACTIVE IN A PUBLIC PLACE: 0
HOW LIKELY ARE YOU TO NOD OFF OR FALL ASLEEP WHILE WATCHING TV: 0
ESS TOTAL SCORE: 0
HOW LIKELY ARE YOU TO NOD OFF OR FALL ASLEEP IN A CAR, WHILE STOPPED FOR A FEW MINUTES IN TRAFFIC: 0
HOW LIKELY ARE YOU TO NOD OFF OR FALL ASLEEP WHILE SITTING QUIETLY AFTER LUNCH WITHOUT ALCOHOL: 0
HOW LIKELY ARE YOU TO NOD OFF OR FALL ASLEEP WHILE LYING DOWN TO REST IN THE AFTERNOON WHEN CIRCUMSTANCES PERMIT: 0
HOW LIKELY ARE YOU TO NOD OFF OR FALL ASLEEP WHILE SITTING AND TALKING TO SOMEONE: 0
HOW LIKELY ARE YOU TO NOD OFF OR FALL ASLEEP WHEN YOU ARE A PASSENGER IN A CAR FOR AN HOUR WITHOUT A BREAK: 0

## 2021-11-17 NOTE — PROGRESS NOTES
COPD  Chronic smoking  Major depression  HISTORY OF PRESENT ILLNESS:    Iris Alonso is a 54y.o. year old female here for evaluation of chronic obstructive lung disease. She has been a chronic smoker for almost 30 pack years. She is still smoking. She attributes to her continued smoking due to depression and anxiety. He has been diagnosed to have chronic obstructive lung disease due to chronic bronchitis and emphysema I reviewed her pulmonary function study which does reveal severe degree of obstructive ventilatory dysfunction. He is on Spiriva and has been on albuterol. She been under the care of Dr. Martha Bhakta in the past.  Patient denies any frequent chest colds or pneumonias. Denies any chest pain no hemoptysis no pedal edema no thromboembolic process. She claims that she is taking her medication regularly but seems that she is not doing so. She does have significant major depression have has had suicidal tendencies in the past.  She is not known to have any hypertension or heart disease diabetes or thyroid dysfunction. No stroke no peptic ulcer disease or esophageal reflux disease. She has not taken her code vaccine. She is afraid of the side effects of covered vaccine. Patient is thin built brew bloater. Very likely has a component of malnutrition. I reviewed her chest x-ray which are consistent with hyperinflation due to COPD. She had a CT scan was also reviewed she does have areas of pleural calcification. She had a pneumothorax on the right side x2 and underwent talc insufflation for up as part of the treatment that probably caused the calcifications. I do not think she has any mesothelioma or bronchogenic carcinoma. There is no evidence of any of any asbestosis. She was never exposed to special asbestos by itself. Her father had asbestosis but he worked in the [employer] and was . Patient is not known to have any thyroid dysfunction.   Taking her antidepressants regularly. LUNG CANCER SCREENING     1. CRITERIA MET    [x]     CT ORDERED  []      2. CRITERIA NOT MET   []      3. REFUSED                    []        REASON CRITERIA NOT MET     1. SMOKING LESS THAN 30 PY  []      2. AGE LESS THAN 55 or GREATER 77 YEARS  []      3. QUIT SMOKING 15 YEARS OR GREATER   []      4. RECENT CT WITH IN 11 MONTHS    []      5. LIFE EXPECTANCY < 5 YEARS   []      6. SIGNS  AND SYMPTOMS OF LUNG CANCER   []         Immunization   Immunization History   Administered Date(s) Administered    Influenza, Quadv, IM, (6 mo and older Fluzone, Flulaval, Fluarix and 3 yrs and older Afluria) 09/29/2017    Pneumococcal Polysaccharide ([id number]) 09/29/2017        Pneumococcal Vaccine     [x] Up to date    [] Indicated   [] Refused  [] Contraindicated       Influenza Vaccine   [x] Up to date    [] Indicated   [] Refused  [] Contraindicated      Not taken code vaccine  PAST MEDICAL HISTORY:       Diagnosis Date    Acute bronchitis with bronchospasm 9/28/2016    Allergic rhinitis 9/28/2016    Anxiety     Anxiety and depression     Apical lung scarring     Right    Asthma     exercized induced    Borderline personality disorder 12/3/2014    Chronic abdominal pain 7/7/2018    Chronic fatigue 6/24/2017    COPD, mild (HCC)     Emphysema of lung (HCC)     Severe    History of bronchitis     History of chest pain     History of pneumocystis pneumonia 9/28/2016    History of suicide attempt     Multiple admissions to [facility] for suicide attempts    Hyperglycemia 6/24/2017    Moderate episode of recurrent major depressive disorder ([facility] [address] 12/3/2014    Ovarian cyst 707    Panic disorder with agoraphobia and severe panic attacks 12/3/2014    Panlobular emphysema ([facility] [address] 6/24/2017    Pleural plaque     from pleurodesis bilaterally. The patient has no personal history of asbestos exposure, but her father had asbestos exposure.     Pneumonia     Right middle lobe    Pneumothorax, spontaneous, tension     Bilateral    PTSD (post-traumatic stress disorder) 2018    Shortness of breath     Slow transit constipation 2017         Family History:       Problem Relation Age of Onset    Hypertension Father     Coronary Art Dis Father     Cancer Father         Lung    Heart Disease Father     Depression Mother         Sisters X3    High Blood Pressure Mother     Asthma Mother     Alcohol Abuse Sister         Brother food abuse, is [address] pound    Asthma Other     High Blood Pressure Other     Diabetes Neg Hx     Tuberculosis Neg Hx        SURGICAL HISTORY:   Past Surgical History:   Procedure Laterality Date     SECTION      X 2    HYSTERECTOMY      Total    LUNG SURGERY  [id number]    Kamlesh chest tubes    OTHER SURGICAL HISTORY Bilateral     Talc pleurodesis by Dr. Ontiveros Manner:      There is no history of TB or TB exposure. There is no asbestos or silica dust exposure. The patient repor is no coal, foundry, quarry or [employer] exposure. Travel history reveals absent. There is no history of recreational or IV drug use. There no hot tube exposure. Pets absent    Occupational history no significant occupational exposure    TOBACCO:   reports that she has been smoking cigarettes. She has a 30.00 pack-year smoking history. She has never used smokeless tobacco.  ETOH:   reports current alcohol use. ALLERGIES:      Allergies   Allergen Reactions    Bee Pollen Swelling    Neosporin [Neomycin-Polymyxin-Gramicidin]      hives    Triple Antibiotic [Neomycin-Bacitracin Zn-Polymyx]      hives         Home Meds:   Prior to Admission medications    Medication Sig Start Date End Date Taking?  Authorizing Provider   Fluticasone-Umeclidin-Vilant (TRELEGY ELLIPTA) 200-62.5-25 MCG/INH AEPB Inhale 1 actuation into the lungs daily 21 Yes Pratibha Cruz MD   rosuvastatin (CRESTOR) 10 MG tablet Take 1 tablet by mouth nightly 9/27/21  Yes Governor Ty MD   albuterol sulfate HFA (PROVENTIL HFA) 108 (90 Base) MCG/ACT inhaler Inhale 2 puffs into the lungs every 6 hours as needed for Wheezing or Shortness of Breath 9/16/21  Yes Governor Ty MD   tiotropium (Cleda Olp) 18 MCG inhalation capsule Inhale 1 capsule into the lungs daily 9/16/21  Yes Governor Ty MD   docusate sodium (COLACE) 100 MG capsule Take 1 capsule by mouth 2 times daily 5/11/20  Yes Kraig Gaston MD   sennosides-docusate sodium (SENOKOT-S) 8.6-50 MG tablet Take 1 tablet by mouth daily as needed for Constipation Try once a week every week 5/11/20  Yes Kraig Gaston MD   traZODone (DESYREL) 150 MG tablet Take 75 mg by mouth nightly    Yes Historical Provider, MD   escitalopram (LEXAPRO) 20 MG tablet Take 20 mg by mouth daily    Yes Historical Provider, MD   diphenhydrAMINE (BENADRYL) 25 MG tablet Take 25 mg by mouth every 6 hours as needed for Itching   Yes Historical Provider, MD   QUEtiapine (SEROQUEL) 200 MG tablet Take 1 tablet by mouth 2 times daily at 0800 and 1400 10/13/15  Yes Jackie Ward MD   QUEtiapine (SEROQUEL) 400 MG tablet Take 1 tablet by mouth daily  Patient taking differently: Take 400 mg by mouth nightly  10/13/15  Yes Jackie Ward MD   EPINEPHrine (EPIPEN) 0.3 MG/0.3ML SOAJ injection Use as directed for allergic reaction 5/23/14  Yes Ramin Castellano DO   clonazePAM (KLONOPIN) 0.5 MG tablet Take 0.5 mg by mouth daily as needed for Anxiety    Yes Historical Provider, MD              REVIEW OF SYSTEMS:    CONSTITUTIONAL:  negative for  fevers, chills, sweats, fatigue, malaise, anorexia and weight loss  pink puffer very nervous evidence of malnutrition  EYES:  negative for  double vision, blurred vision, dry eyes, eye discharge and redness no jaundice no Aisha syndrome HEENT:  negative for  hearing loss, tinnitus, ear drainage, earaches and nasal congestion completed and negative  RESPIRATORY:  See hpi as noted above CARDIOVASCULAR:  negative for  chest pain,, palpitations, orthopnea, PND  GASTROINTESTINAL:  negative for nausea, vomiting, change in bowel habits, diarrhea, constipation, abdominal pain, pruritus, abdominal mass and abdominal distention completed and negative  GENITOURINARY:  negative for frequency, dysuria, nocturia, urinary incontinence and hesitancy completed and negative INTEGUMENT  negative for rash, skin lesion(s), dryness, skin color change, changes in lesion, pruritus and changes in hair completed and negative HEMATOLOGIC/LYMPHATIC:  negative for easy bruising, bleeding, lymphadenopathy, petechiae and swelling/edema  ALLERGIC/IMMUNOLOGIC:  negative for recurrent infections, urticaria and drug reactions no allergies ENDOCRINE:  negative for heat intolerance, cold intolerance, tremor, weight changes and change in bowel habits  MUSCULOSKELETAL:  negative for  myalgias, arthralgias, pain, joint swelling, stiff joints and decreased range of motion no joint swelling  NEUROLOGICAL:  negative for headaches, dizziness, seizures, memory problems, speech problems, visual disturbance and coordination problems no motor or sensory deficit  BEHAVIOR/PSYCH:  negative for poor appetite, increased appetite, decreased sleep, increased sleep, decreased energy level, increased energy level and poor concentration history of major depression  Skin no rash no dermatitis no skin rash  Vitals:  BP 96/69   Pulse 98   Temp 96.6 °F (35.9 °C)   Resp 18   Ht 5' 6\" (1.676 m)   Wt 110 lb (49.9 kg)   LMP  (LMP Unknown)   SpO2 99%   BMI 17.75 kg/m²     PHYSICAL EXAM:  General Appearance:    Alert, cooperative, no distress, appears stated age possible mild malnutrition no respiratory distress not using accessory muscles very anxious   Head:    Normocephalic, without obvious abnormality, atraumatic      Eyes:    PERRL, conjunctiva/corneas clear, EOM's intact no jaundice no Aisha syndrome Ears:    Normal  external ear canals, both ears   Nose:  No polyps no no sinus tenderness   Throat:   Lips, mucosa, and tongue normal; teeth and gums normal   Neck:   Supple, symmetrical, trachea midline, no adenopathy;     thyroid:  no enlargement/tenderness/nodules; no carotid    bruit , JVD not elevated hepatojugular reflux is negative   Back:     Symmetric, no curvature, ROM normal, no CVA tenderness   Lungs:    AP diameter is increased percussion note is hyperresonant expiration prolonged no rales rhonchi are audible no pleural friction rub is audible   Chest Wall:    No tenderness or deformity      Heart:    Regular rate and rhythm, S1 and S2 normal, no murmur, rub        or gallop no rvh distant heart sounds due to hyperinflation                          Abdomen:                                                 Pulses:                              Skin:                  Lymph nodes:                    Neurologic:                  Soft, non-tender, bowel sounds active all four quadrants,     no masses, no organomegaly         2+ and symmetric all extremities     Skin color, texture, turgor normal, no rashes or lesions       Cervical, supraclavicular not enlarged or matted or tender      CNII-XII intact, normal strength 5/5 . Sensation grossly normal  and reflexes normal 2+  throughout     Clubbing No  Lower ext edema absent  Upper ext edema absent         Musculoskeletal no synovitis. No joint swelling or tenderness        CBC: No results for input(s): WBC, HGB, PLT in the last 72 hours. BMP:  No results for input(s): NA, K, CL, CO2, BUN, CREATININE, GLUCOSE in the last 72 hours. Hepatic: No results for input(s): AST, ALT, ALB, BILITOT, ALKPHOS in the last 72 hours. Amylase: No results found for: AMYLASE  Lipase:   Lab Results   Component Value Date    LIPASE 41 05/11/2020     Troponin: No results for input(s): TROPONINI in the last 72 hours.   BNP: No results for input(s): BNP in the last 72 hours. Lipids: No results for input(s): CHOL, HDL in the last 72 hours. Invalid input(s): LDLCALCU  ABGs: No results found for: PHART, PO2ART, UFC4DYF  INR: No results for input(s): INR in the last 72 hours. Thyroid:   Lab Results   Component Value Date    TSH 3.25 09/27/2021     Urinalysis: No results for input(s): BACTERIA, BLOODU, CLARITYU, COLORU, PHUR, PROTEINU, RBCUA, SPECGRAV, BILIRUBINUR, NITRU, WBCUA, LEUKOCYTESUR, GLUCOSEU in the last 72 hours. Cultures:-  Cultures    Chest chest x-ray reviewed and show  Hyperinflation no cardiomegaly no pleural effusion no infiltrates    CT Scans  Consistent with COPD right pleural calcification due to prior insufflation of talc for treatment of pneumothorax    Echo  No recent echo            IMPRESSION:    Visit Diagnoses       Codes    Chronic obstructive pulmonary disease, unspecified COPD type ([facility] [address]    -  Primary J44.9    Episode of recurrent major depressive disorder, unspecified depression episode severity ([facility] [address]     F33.9        :                PLAN:      Patient has COPD. He was advised to give up smoking altogether  She will continue to use her albuterol on as-needed basis    Started her on Trelegy Ellipta which is a combination of steroid long-acting beta agonist and anticholinergic. This may help to control her symptoms better. She will stop the Spiriva. She will continue her antidepressants as before. I encouraged her to increase her caloric intake. BMI less than 20 is a poor prognostic sign. He will continue her antidepressants as before. And follow-up with psychiatry. I once again advised her to take the code vaccine but she refused he already taken the flu vaccine and Pneumovax    We will continue to follow her for lung cancer screening with yearly CT scans for screening.     Dictated with Dr. Barry Jasso MD dictation over thank you    Requested Prescriptions     Signed Prescriptions Disp Refills    Fluticasone-Umeclidin-Vilant (MUNIRA BEST) 200-62.5-25 MCG/INH AEPB 1 each 3     Sig: Inhale 1 actuation into the lungs daily       There are no discontinued medications. Naomie Livingston received counseling on the following healthy behaviors: nutrition, exercise and medication adherence    Patient given educational materials : see patient instruction       Discussed use, benefit, and side effects of prescribed medications. Barriers to medication compliance addressed. All patient questions answered. Pt voiced understanding. I hope this updates you on my evaluation and clinical thinking. Thank you for allowing me to participate in his care. Sincerely,      Electronically signed by Bushra Goode MD on   11/17/21 at 3:35 PM EST       Please note that this chart was generated using voice recognition Dragon dictation software. Although every effort was made to ensure the accuracy of this automated transcription, some errors in transcription may have occurred.

## 2021-12-15 SDOH — ECONOMIC STABILITY: INCOME INSECURITY: IN THE LAST 12 MONTHS, WAS THERE A TIME WHEN YOU WERE NOT ABLE TO PAY THE MORTGAGE OR RENT ON TIME?: NO

## 2021-12-15 SDOH — ECONOMIC STABILITY: FOOD INSECURITY: WITHIN THE PAST 12 MONTHS, YOU WORRIED THAT YOUR FOOD WOULD RUN OUT BEFORE YOU GOT MONEY TO BUY MORE.: NEVER TRUE

## 2021-12-15 SDOH — ECONOMIC STABILITY: TRANSPORTATION INSECURITY
IN THE PAST 12 MONTHS, HAS THE LACK OF TRANSPORTATION KEPT YOU FROM MEDICAL APPOINTMENTS OR FROM GETTING MEDICATIONS?: YES

## 2021-12-15 SDOH — ECONOMIC STABILITY: HOUSING INSECURITY
IN THE LAST 12 MONTHS, WAS THERE A TIME WHEN YOU DID NOT HAVE A STEADY PLACE TO SLEEP OR SLEPT IN A SHELTER (INCLUDING NOW)?: NO

## 2021-12-15 SDOH — ECONOMIC STABILITY: FOOD INSECURITY: WITHIN THE PAST 12 MONTHS, THE FOOD YOU BOUGHT JUST DIDN'T LAST AND YOU DIDN'T HAVE MONEY TO GET MORE.: NEVER TRUE

## 2021-12-15 SDOH — ECONOMIC STABILITY: TRANSPORTATION INSECURITY
IN THE PAST 12 MONTHS, HAS LACK OF TRANSPORTATION KEPT YOU FROM MEETINGS, WORK, OR FROM GETTING THINGS NEEDED FOR DAILY LIVING?: YES

## 2021-12-15 ASSESSMENT — PATIENT HEALTH QUESTIONNAIRE - PHQ9
2. FEELING DOWN, DEPRESSED OR HOPELESS: 0
SUM OF ALL RESPONSES TO PHQ9 QUESTIONS 1 & 2: 0
SUM OF ALL RESPONSES TO PHQ QUESTIONS 1-9: 0
1. LITTLE INTEREST OR PLEASURE IN DOING THINGS: 0

## 2021-12-15 ASSESSMENT — SOCIAL DETERMINANTS OF HEALTH (SDOH): HOW HARD IS IT FOR YOU TO PAY FOR THE VERY BASICS LIKE FOOD, HOUSING, MEDICAL CARE, AND HEATING?: NOT HARD AT ALL

## 2021-12-15 NOTE — PROGRESS NOTES
Visit Information    Have you changed or started any medications since your last visit including any over-the-counter medicines, vitamins, or herbal medicines? no   Are you having any side effects from any of your medications? -  no  Have you stopped taking any of your medications? Is so, why? -  no    Have you seen any other physician or provider since your last visit? No  Have you had any other diagnostic tests since your last visit? No  Have you been seen in the emergency room and/or had an admission to a hospital since we last saw you? No  Have you had your routine dental cleaning in the past 6 months? no    Have you activated your [employer] account? If not, what are your barriers?  Yes     Patient Care Team:  David Zepeda MD as PCP - Ene Olivares MD as PCP - [facility]  Darryl Garcia MD as Surgeon (Orthopedic Surgery)  Erlin Williamson MD as Consulting Physician (Pulmonology)    Medical History Review  Past Medical, Family, and Social History reviewed and does contribute to the patient presenting condition    Health Maintenance   Topic Date Due    COVID-19 Vaccine (1) Never done    DTaP/Tdap/Td vaccine (1 - Tdap) Never done    Colon cancer screen colonoscopy  Never done    Flu vaccine (1) 09/01/2021    Lipid screen  09/27/2022    Pneumococcal 0-64 years Vaccine (2 of 2 - PPSV23) 04/09/2037    Hepatitis C screen  Completed    HIV screen  Completed    Hepatitis A vaccine  Aged Out    Hepatitis B vaccine  Aged Out    Hib vaccine  Aged Out    Meningococcal (ACWY) vaccine  Aged Out

## 2021-12-16 ENCOUNTER — VIRTUAL VISIT (OUTPATIENT)
Dept: FAMILY MEDICINE CLINIC | Age: 51
End: 2021-12-16
Payer: COMMERCIAL

## 2021-12-16 DIAGNOSIS — F33.41 RECURRENT MAJOR DEPRESSIVE DISORDER, IN PARTIAL REMISSION (HCC): ICD-10-CM

## 2021-12-16 DIAGNOSIS — N17.9 AKI (ACUTE KIDNEY INJURY) (HCC): ICD-10-CM

## 2021-12-16 DIAGNOSIS — E78.5 HYPERLIPIDEMIA WITH TARGET LDL LESS THAN 100: ICD-10-CM

## 2021-12-16 DIAGNOSIS — J43.1 PANLOBULAR EMPHYSEMA (HCC): Primary | ICD-10-CM

## 2021-12-16 DIAGNOSIS — Z12.11 SCREEN FOR COLON CANCER: ICD-10-CM

## 2021-12-16 PROCEDURE — 99214 OFFICE O/P EST MOD 30 MIN: CPT | Performed by: FAMILY MEDICINE

## 2021-12-16 PROCEDURE — G8427 DOCREV CUR MEDS BY ELIG CLIN: HCPCS | Performed by: FAMILY MEDICINE

## 2021-12-16 RX ORDER — FLUTICASONE FUROATE, UMECLIDINIUM BROMIDE AND VILANTEROL TRIFENATATE 200; 62.5; 25 UG/1; UG/1; UG/1
1 POWDER RESPIRATORY (INHALATION) DAILY
Qty: 1 EACH | Refills: 3
Start: 2021-12-16 | End: 2022-01-15

## 2021-12-16 RX ORDER — ALBUTEROL SULFATE 90 UG/1
2 AEROSOL, METERED RESPIRATORY (INHALATION) EVERY 6 HOURS PRN
Qty: 18 G | Refills: 1 | Status: SHIPPED | OUTPATIENT
Start: 2021-12-16

## 2021-12-16 ASSESSMENT — ENCOUNTER SYMPTOMS
NAUSEA: 0
VOMITING: 0
DIARRHEA: 0
WHEEZING: 0
SHORTNESS OF BREATH: 1
CONSTIPATION: 0
CHEST TIGHTNESS: 0
ABDOMINAL PAIN: 0
ABDOMINAL DISTENTION: 0
COUGH: 1

## 2021-12-16 NOTE — PROGRESS NOTES
[facility] Phone      Send Link Via Text    No text has been sent  Last text sent2021 9:45 AM  To:[phone]  Email      Send Link Via Email    No email has been sent  Last email sent2021 9:45 AM  [email or web address]            2021    TELEHEALTH EVALUATION -- Audio/Visual (During [place] [address] public health emergency)      Camryn Abrams (:  1970) is a 54 y.o. female,Established patient, here for evaluation of the following chief complaint(s): COPD, Hyperlipidemia, Results, and Health Maintenance (Received the Cologuard but did not send it yet)        ASSESSMENT/PLAN:    1. Panlobular emphysema ([address]  Improved with medications  -     Fluticasone-Umeclidin-Vilant (Ronita Ates) 200-62.5-25 MCG/INH AEPB; Inhale 1 actuation into the lungs daily, Disp-1 each, R-3NO PRINT  -     albuterol sulfate HFA (PROVENTIL HFA) 108 (90 Base) MCG/ACT inhaler; Inhale 2 puffs into the lungs every 6 hours as needed for Wheezing or Shortness of Breath Rescue inhaler, Disp-18 g, R-1Normal  Counseling given regarding correct use of inhalers  Follow-up with pulmonologist as scheduled  Strongly counseled to quit smoking   she declines flu shot  She says she will get COVID-19 at [facility], discussed how to schedule it. 2. Hyperlipidemia with target LDL less than 100  Inadequately controlled  Continue Crestor 10 mg, recheck labs, continue low-carb diet, low-fat diet, quit smoking, exercise  Lab Results   Component Value Date    LDLCHOLESTEROL 194 (H) 2021       -     Comprehensive Metabolic Panel; Future  -     Lipid Panel; Future  3. JULIA (acute kidney injury) ([address]  Unsure if resolved or not  GFR 50 on 2021, previously over 60 on 2020  Patient was advised to increase fluids, and to absolutely avoid NSAIDs  -     Magnesium; Future  -     Comprehensive Metabolic Panel; Future  -     Phosphorus; Future  -     Urinalysis Reflex to Culture; Future  4.  Recurrent major depressive disorder, effective. Residual symptoms: chronic dyspnea and cough productive of white sputum in small amounts. Reports she is getting chest pains , for many years since having pneumothorax, chest tubes bilateral and pleurodesis    She denies purulent sputum, wheezing, chest tightness. She requires her rescue inhaler 4 time(s) per week. Nicotine dependence. Smoker, counseling given to quit smoking. She is currently cutting down smoking, currently smoking just 15 cigarettes/day, she says she has changed her cigarettes from regular wants to light ones  Was smoking 1-2 PPD    Ready to quit: Yes  Counseling given: Yes    Noticed Acute kidney injury  She says she was taking a lot of  Ibuprofen at that time  Taking tylenol now  She denies being dehydrated  GFR 50 on 9/27/2021  Denies frequency, urgency or dysuria. Denies flank pain    Hyperlipidemia:  No new myalgias or GI upset on rosuvastatin (Crestor). She has started Crestor just a few months ago. Medication compliance: compliant all of the time. Patient is  following a low fat, low cholesterol diet. Quit eating sweets    LDL is very high  Lab Results   Component Value Date    LDLCHOLESTEROL 194 (H) 09/27/2021     Lab Results   Component Value Date    TRIG 126 09/27/2021     Depression is improved  She reports compliance with her medications, she goes to psychiatrist at [facility]. PHQ-2 Over the past 2 weeks, how often have you been bothered by any of the following problems? Little interest or pleasure in doing things: Not at all  Feeling down, depressed, or hopeless: Not at all  PHQ-2 Score: 0  PHQ-9 Over the past 2 weeks, how often have you been bothered by any of the following problems? PHQ-9 Total Score: 0      PHQ Scores 12/15/2021 9/15/2021 7/6/2018 8/13/2013   PHQ2 Score 0 6 3 -   PHQ9 Score 0 21 10 16       Patient is due for colon cancer screening.   Noe Clemens denies  nausea, vomiting, diarrhea, constipation, blood in the stool or abdominal pain.  We discussed options, she would like to have: Alicja. Review of Systems   Constitutional: Positive for fatigue. Negative for activity change, appetite change, chills, diaphoresis, fever and unexpected weight change. Respiratory: Positive for cough and shortness of breath. Negative for chest tightness and wheezing. Cardiovascular: Positive for chest pain (Musculoskeletal on and off). Negative for palpitations and leg swelling. Gastrointestinal: Negative for abdominal distention, abdominal pain, constipation, diarrhea, nausea and vomiting. Endocrine: Negative for cold intolerance, heat intolerance, polydipsia, polyphagia and polyuria. Neurological: Negative for numbness. Hematological: Does not bruise/bleed easily. Psychiatric/Behavioral: Negative for dysphoric mood and sleep disturbance. The patient is nervous/anxious. Prior to Visit Medications    Medication Sig Taking?  Authorizing Provider   rosuvastatin (CRESTOR) 10 MG tablet Take 1 tablet by mouth nightly Yes Sukhwinder Cuadra MD   albuterol sulfate HFA (PROVENTIL HFA) 108 (90 Base) MCG/ACT inhaler Inhale 2 puffs into the lungs every 6 hours as needed for Wheezing or Shortness of Breath Yes Sukhwinder Cuadra MD   traZODone (DESYREL) 150 MG tablet Take 75 mg by mouth nightly  Yes Historical Provider, MD   escitalopram (LEXAPRO) 20 MG tablet Take 20 mg by mouth daily  Yes Historical Provider, MD   QUEtiapine (SEROQUEL) 200 MG tablet Take 1 tablet by mouth 2 times daily at 0800 and 1400 Yes Tamela Snow MD   QUEtiapine (SEROQUEL) 400 MG tablet Take 1 tablet by mouth daily  Patient taking differently: Take 400 mg by mouth nightly  Yes Tamela Snow MD   EPINEPHrine (EPIPEN) 0.3 MG/0.3ML SOAJ injection Use as directed for allergic reaction Yes Pari Pereyra,    Fluticasone-Umeclidin-Vilant (TRELEGY ELLIPTA) 200-62.5-25 MCG/INH AEPB Inhale 1 actuation into the lungs daily  Patient not taking: Reported on moist.     External Ears [x] Normal  [] Abnormal-     Neck: [x] No visualized mass     Pulmonary/Chest: [x] Respiratory effort normal.  [x] No visualized signs of difficulty breathing or respiratory distress        [] Abnormal        Musculoskeletal:   [x] Normal gait with no signs of ataxia         [x] Normal range of motion of neck        [] Abnormal-       Neurological:        [x] No Facial Asymmetry (Cranial nerve 7 motor function) (limited exam to video visit)          [x] No gaze palsy        [] Abnormal-            Skin:        [x] No significant exanthematous lesions or discoloration noted on facial skin         [] Abnormal-            Psychiatric:      [x] No Hallucinations       [x]Mood is normal      [x]Behavior is normal      [x]Judgment is normal      [x]Thought content is normal       [] Abnormal    Other pertinent observable physical exam findings- none    Due to this being a TeleHealth encounter, evaluation of the following organ systems is limited: Vitals/Constitutional/EENT/Resp/CV/GI//MS/Neuro/Skin/Heme-Lymph-Imm. I personally reviewed most recent labs reviewed with the patient and all questions fully answered.    Acute kidney injury  Mild Hyperglycemia  hyperlipidemia    Otherwise labs within normal limits        Lab Results   Component Value Date    WBC 6.4 09/27/2021    HGB 12.1 09/27/2021    HCT 36.8 09/27/2021    MCV 88.1 09/27/2021     09/27/2021       Lab Results   Component Value Date     09/27/2021    K 3.9 09/27/2021     09/27/2021    CO2 23 09/27/2021    BUN 17 09/27/2021    CREATININE 1.15 09/27/2021    GLUCOSE 101 09/27/2021    CALCIUM 9.5 09/27/2021        Lab Results   Component Value Date    ALT 6 09/27/2021    AST 15 09/27/2021    ALKPHOS 89 09/27/2021    BILITOT 0.22 (L) 09/27/2021       Lab Results   Component Value Date    TSH 3.25 09/27/2021       Lab Results   Component Value Date    CHOL 273 (H) 09/27/2021     Lab Results   Component Value Date    TRIG 200-62.5-25 MCG/INH AEPB REORDER    albuterol sulfate HFA (PROVENTIL HFA) 108 (90 Base) MCG/ACT inhaler DUPLICATE              Chuck Leaks, was evaluated through a synchronous (real-time) audio-video encounter. The patient (or guardian if applicable) is aware that this is a billable service. Verbal consent to proceed has been obtained within the past 12 months. The visit was conducted pursuant to the emergency declaration under the [address] [place] [address], [address] [place] [address] and the [employer] Act. Patient identification was verified    Patient was alone   The patient was located in a state where the provider was credentialed to provide care. On this date 12/16/2021 I have spent 35 minutes reviewing previous notes, test results and face to face with the patient discussing the diagnosis and importance of compliance with the treatment plan as well as documenting on the day of the visit. --Kenna Carrillo MD on 12/20/2021 at 3:28 PM    An electronic signature was used to authenticate this note.

## 2021-12-20 PROBLEM — N17.9 AKI (ACUTE KIDNEY INJURY) (HCC): Status: ACTIVE | Noted: 2021-12-20

## 2022-01-26 ENCOUNTER — VIRTUAL VISIT (OUTPATIENT)
Dept: PULMONOLOGY | Age: 52
End: 2022-01-26
Payer: COMMERCIAL

## 2022-01-26 DIAGNOSIS — J44.9 CHRONIC OBSTRUCTIVE PULMONARY DISEASE, UNSPECIFIED COPD TYPE (HCC): Primary | ICD-10-CM

## 2022-01-26 PROCEDURE — G8419 CALC BMI OUT NRM PARAM NOF/U: HCPCS | Performed by: INTERNAL MEDICINE

## 2022-01-26 PROCEDURE — G8484 FLU IMMUNIZE NO ADMIN: HCPCS | Performed by: INTERNAL MEDICINE

## 2022-01-26 PROCEDURE — G8427 DOCREV CUR MEDS BY ELIG CLIN: HCPCS | Performed by: INTERNAL MEDICINE

## 2022-01-26 PROCEDURE — 3023F SPIROM DOC REV: CPT | Performed by: INTERNAL MEDICINE

## 2022-01-26 PROCEDURE — 99214 OFFICE O/P EST MOD 30 MIN: CPT | Performed by: INTERNAL MEDICINE

## 2022-01-26 PROCEDURE — 4004F PT TOBACCO SCREEN RCVD TLK: CPT | Performed by: INTERNAL MEDICINE

## 2022-01-26 RX ORDER — CLONAZEPAM 0.5 MG/1
TABLET ORAL
COMMUNITY
Start: 2022-01-18

## 2022-01-26 ASSESSMENT — SLEEP AND FATIGUE QUESTIONNAIRES
HOW LIKELY ARE YOU TO NOD OFF OR FALL ASLEEP WHILE WATCHING TV: 1
HOW LIKELY ARE YOU TO NOD OFF OR FALL ASLEEP IN A CAR, WHILE STOPPED FOR A FEW MINUTES IN TRAFFIC: 0
HOW LIKELY ARE YOU TO NOD OFF OR FALL ASLEEP WHILE SITTING AND READING: 1
HOW LIKELY ARE YOU TO NOD OFF OR FALL ASLEEP WHEN YOU ARE A PASSENGER IN A CAR FOR AN HOUR WITHOUT A BREAK: 0
HOW LIKELY ARE YOU TO NOD OFF OR FALL ASLEEP WHILE SITTING QUIETLY AFTER LUNCH WITHOUT ALCOHOL: 0
ESS TOTAL SCORE: 2
HOW LIKELY ARE YOU TO NOD OFF OR FALL ASLEEP WHILE LYING DOWN TO REST IN THE AFTERNOON WHEN CIRCUMSTANCES PERMIT: 0
HOW LIKELY ARE YOU TO NOD OFF OR FALL ASLEEP WHILE SITTING INACTIVE IN A PUBLIC PLACE: 0
HOW LIKELY ARE YOU TO NOD OFF OR FALL ASLEEP WHILE SITTING AND TALKING TO SOMEONE: 0

## 2022-01-26 NOTE — PROGRESS NOTES
COPD  Chronic smoking  Major depression  HISTORY OF PRESENT ILLNESS:    Shaina Crowe is a 54y.o. year old female here for evaluation of chronic obstructive lung disease. She has been a chronic smoker for almost 30 pack years. She is still smoking. She attributes to her continued smoking due to depression and anxiety. He has been diagnosed to have chronic obstructive lung disease due to chronic bronchitis and emphysema I reviewed her pulmonary function study which does reveal severe degree of obstructive ventilatory dysfunction. He is on Spiriva and has been on albuterol. She been under the care of Dr. Meseret Linn in the past.  Patient denies any frequent chest colds or pneumonias. Denies any chest pain no hemoptysis no pedal edema no thromboembolic process. She claims that she is taking her medication regularly but seems that she is not doing so. She does have significant major depression have has had suicidal tendencies in the past.  She is not known to have any hypertension or heart disease diabetes or thyroid dysfunction. No stroke no peptic ulcer disease or esophageal reflux disease. She has not taken her code vaccine. She is afraid of the side effects of covered vaccine. Patient is thin built brew bloater. Very likely has a component of malnutrition. I reviewed her chest x-ray which are consistent with hyperinflation due to COPD. She had a CT scan was also reviewed she does have areas of pleural calcification. She had a pneumothorax on the right side x2 and underwent talc insufflation for up as part of the treatment that probably caused the calcifications. I do not think she has any mesothelioma or bronchogenic carcinoma. There is no evidence of any of any asbestosis. She was never exposed to special asbestos by itself. Her father had asbestosis but he worked in the [employer] and was . Patient is not known to have any thyroid dysfunction.   Taking her antidepressants regularly. LUNG CANCER SCREENING     1. CRITERIA MET    [x]     CT ORDERED  []      2. CRITERIA NOT MET   []      3. REFUSED                    []        REASON CRITERIA NOT MET     1. SMOKING LESS THAN 30 PY  []      2. AGE LESS THAN 55 or GREATER 77 YEARS  []      3. QUIT SMOKING 15 YEARS OR GREATER   []      4. RECENT CT WITH IN 11 MONTHS    []      5. LIFE EXPECTANCY < 5 YEARS   []      6. SIGNS  AND SYMPTOMS OF LUNG CANCER   []         Immunization   Immunization History   Administered Date(s) Administered    Influenza, Quadv, IM, (6 mo and older Fluzone, Flulaval, Fluarix and 3 yrs and older Afluria) 09/29/2017    Pneumococcal Polysaccharide ([id number]) 09/29/2017        Pneumococcal Vaccine     [x] Up to date    [] Indicated   [] Refused  [] Contraindicated       Influenza Vaccine   [x] Up to date    [] Indicated   [] Refused  [] Contraindicated      Not taken code vaccine  PAST MEDICAL HISTORY:       Diagnosis Date    Acute bronchitis with bronchospasm 9/28/2016    Allergic rhinitis 9/28/2016    Anxiety     Anxiety and depression     Apical lung scarring     Right    Asthma     exercized induced    Borderline personality disorder 12/3/2014    Chronic abdominal pain 7/7/2018    Chronic fatigue 6/24/2017    COPD, mild (HCC)     Emphysema of lung (HCC)     Severe    History of bronchitis     History of chest pain     History of pneumocystis pneumonia 9/28/2016    History of suicide attempt     [facility]    Hyperglycemia 6/24/2017    Moderate episode of recurrent major depressive disorder ([facility] 12/3/2014    Ovarian cyst 707    Panic disorder with agoraphobia and severe panic attacks 12/3/2014    Panlobular emphysema ([facility] 6/24/2017    Pleural plaque     from pleurodesis bilaterally. The patient has no personal history of asbestos exposure, but her father had asbestos exposure.     Pneumonia     Right middle lobe    Pneumothorax, spontaneous, tension     Bilateral    PTSD (post-traumatic stress disorder) 2018    Shortness of breath     Slow transit constipation 2017         Family History:       Problem Relation Age of Onset    Hypertension Father     Coronary Art Dis Father     Cancer Father         Lung    Heart Disease Father     Depression Mother         Sisters X3    High Blood Pressure Mother     Asthma Mother     Alcohol Abuse Sister         Brother food abuse, is 0 # pound    Asthma Other     High Blood Pressure Other     Diabetes Neg Hx     Tuberculosis Neg Hx        SURGICAL HISTORY:   Past Surgical History:   Procedure Laterality Date     SECTION      X 2    HYSTERECTOMY  2007    Total    LUNG SURGERY  [id number]    Kamlesh chest tubes    OTHER SURGICAL HISTORY Bilateral     Talc pleurodesis by Dr. Jennifer Hunter:      There is no history of TB or TB exposure. There is no asbestos or silica dust exposure. The patient repor is no coal, foundry, quarry or [employer] exposure. Travel history reveals absent. There is no history of recreational or IV drug use. There no hot tube exposure. Pets absent    Occupational history no significant occupational exposure    TOBACCO:   reports that she has been smoking cigarettes. She has a 30.00 pack-year smoking history. She has never used smokeless tobacco.  ETOH:   reports current alcohol use. ALLERGIES:      Allergies   Allergen Reactions    Bee Pollen Swelling    Neosporin [Neomycin-Polymyxin-Gramicidin]      hives    Triple Antibiotic [Neomycin-Bacitracin Zn-Polymyx]      hives         Home Meds:   Prior to Admission medications    Medication Sig Start Date End Date Taking?  Authorizing Provider   albuterol sulfate HFA (PROVENTIL HFA) 108 (90 Base) MCG/ACT inhaler Inhale 2 puffs into the lungs every 6 hours as needed for Wheezing or Shortness of Breath Rescue inhaler 21 Gage Carmichael MD   rosuvastatin (CRESTOR) 10 MG tablet Take 1 tablet by mouth nightly 9/27/21   Gage Carmichael MD   traZODone (DESYREL) 150 MG tablet Take 75 mg by mouth nightly     Historical Provider, MD   escitalopram (LEXAPRO) 20 MG tablet Take 20 mg by mouth daily     Historical Provider, MD   QUEtiapine (SEROQUEL) 200 MG tablet Take 1 tablet by mouth 2 times daily at 0800 and 1400 10/13/15   Karrie Wells MD   QUEtiapine (SEROQUEL) 400 MG tablet Take 1 tablet by mouth daily  Patient taking differently: Take 400 mg by mouth nightly  10/13/15   Karrie Wells MD   EPINEPHrine (EPIPEN) 0.3 MG/0.3ML SOAJ injection Use as directed for allergic reaction 5/23/14   Arlen Dominguez DO              REVIEW OF SYSTEMS:    CONSTITUTIONAL:  negative for  fevers, chills, sweats, fatigue, malaise, anorexia and weight loss  pink puffer very nervous evidence of malnutrition  EYES:  negative for  double vision, blurred vision, dry eyes, eye discharge and redness no jaundice no Aisha syndrome HEENT:  negative for  hearing loss, tinnitus, ear drainage, earaches and nasal congestion completed and negative  RESPIRATORY:  See hpi as noted above CARDIOVASCULAR:  negative for  chest pain,, palpitations, orthopnea, PND  GASTROINTESTINAL:  negative for nausea, vomiting, change in bowel habits, diarrhea, constipation, abdominal pain, pruritus, abdominal mass and abdominal distention completed and negative  GENITOURINARY:  negative for frequency, dysuria, nocturia, urinary incontinence and hesitancy completed and negative INTEGUMENT  negative for rash, skin lesion(s), dryness, skin color change, changes in lesion, pruritus and changes in hair completed and negative HEMATOLOGIC/LYMPHATIC:  negative for easy bruising, bleeding, lymphadenopathy, petechiae and swelling/edema  ALLERGIC/IMMUNOLOGIC:  negative for recurrent infections, urticaria and drug reactions no allergies ENDOCRINE:  negative for heat intolerance, cold intolerance, tremor, weight changes and change in bowel habits  MUSCULOSKELETAL:  negative for  myalgias, arthralgias, pain, joint swelling, stiff joints and decreased range of motion no joint swelling  NEUROLOGICAL:  negative for headaches, dizziness, seizures, memory problems, speech problems, visual disturbance and coordination problems no motor or sensory deficit  BEHAVIOR/PSYCH:  negative for poor appetite, increased appetite, decreased sleep, increased sleep, decreased energy level, increased energy level and poor concentration history of major depression  Skin no rash no dermatitis no skin rash  Vitals:  LMP  (LMP Unknown)     PHYSICAL EXAM:  General Appearance:    Alert, cooperative, no distress, appears stated age possible mild malnutrition no respiratory distress not using accessory muscles very anxious   Head:    Normocephalic, without obvious abnormality, atraumatic      Eyes:    PERRL, conjunctiva/corneas clear, EOM's intact no jaundice no Aisha syndrome   Ears:    Normal  external ear canals, both ears   Nose:  No polyps no no sinus tenderness   Throat:   Lips, mucosa, and tongue normal; teeth and gums normal   Neck:   Supple, symmetrical, trachea midline, no adenopathy;     thyroid:  no enlargement/tenderness/nodules; no carotid    bruit , JVD not elevated hepatojugular reflux is negative   Back:     Symmetric, no curvature, ROM normal, no CVA tenderness   Lungs:    AP diameter is increased percussion note is hyperresonant expiration prolonged no rales rhonchi are audible no pleural friction rub is audible   Chest Wall:    No tenderness or deformity      Heart:    Regular rate and rhythm, S1 and S2 normal, no murmur, rub        or gallop no rvh distant heart sounds due to hyperinflation                          Abdomen:                                                 Pulses:                              Skin:                  Lymph nodes:                    Neurologic:                  Soft, non-tender, bowel sounds active all four quadrants,     no masses, no organomegaly         2+ and symmetric all extremities     Skin color, texture, turgor normal, no rashes or lesions       Cervical, supraclavicular not enlarged or matted or tender      CNII-XII intact, normal strength 5/5 . Sensation grossly normal  and reflexes normal 2+  throughout     Clubbing No  Lower ext edema absent  Upper ext edema absent         Musculoskeletal no synovitis. No joint swelling or tenderness        CBC: No results for input(s): WBC, HGB, PLT in the last 72 hours. BMP:  No results for input(s): NA, K, CL, CO2, BUN, CREATININE, GLUCOSE in the last 72 hours. Hepatic: No results for input(s): AST, ALT, ALB, BILITOT, ALKPHOS in the last 72 hours. Amylase: No results found for: AMYLASE  Lipase:   Lab Results   Component Value Date    LIPASE 41 05/11/2020     Troponin: No results for input(s): TROPONINI in the last 72 hours. BNP: No results for input(s): BNP in the last 72 hours. Lipids: No results for input(s): CHOL, HDL in the last 72 hours. Invalid input(s): LDLCALCU  ABGs: No results found for: PHART, PO2ART, AML6WME  INR: No results for input(s): INR in the last 72 hours. Thyroid:   Lab Results   Component Value Date    TSH 3.25 09/27/2021     Urinalysis: No results for input(s): BACTERIA, BLOODU, CLARITYU, COLORU, PHUR, PROTEINU, RBCUA, SPECGRAV, BILIRUBINUR, NITRU, WBCUA, LEUKOCYTESUR, GLUCOSEU in the last 72 hours. Cultures:-  Cultures    Chest chest x-ray reviewed and show  Hyperinflation no cardiomegaly no pleural effusion no infiltrates    CT Scans  Consistent with COPD right pleural calcification due to prior insufflation of talc for treatment of pneumothorax    Echo  No recent echo            IMPRESSION:      :                PLAN:      Patient has COPD.   He was advised to give up smoking altogether  She will continue to use her albuterol on as-needed basis    Started her on Trelegy Ellipta which is a combination of steroid long-acting beta agonist and anticholinergic. This may help to control her symptoms better. She will stop the Spiriva. She will continue her antidepressants as before. I encouraged her to increase her caloric intake. BMI less than 20 is a poor prognostic sign. He will continue her antidepressants as before. And follow-up with psychiatry. I once again advised her to take the code vaccine but she refused he already taken the flu vaccine and Pneumovax    We will continue to follow her for lung cancer screening with yearly CT scans for screening. Dictated with Dr. Facundo Culver MD dictation over thank you    Requested Prescriptions      No prescriptions requested or ordered in this encounter       There are no discontinued medications. Nick Alicea received counseling on the following healthy behaviors: nutrition, exercise and medication adherence    Patient given educational materials : see patient instruction       Discussed use, benefit, and side effects of prescribed medications. Barriers to medication compliance addressed. All patient questions answered. Pt voiced understanding. I hope this updates you on my evaluation and clinical thinking. Thank you for allowing me to participate in his care. Sincerely,      Electronically signed by Aga Delong MD on   11/17/21 at 3:35 PM EST       Please note that this chart was generated using voice recognition Dragon dictation software. Although every effort was made to ensure the accuracy of this automated transcription, some errors in transcription may have occurred.

## 2022-01-26 NOTE — PROGRESS NOTES
2022    TELEHEALTH EVALUATION -- Audio/Visual (During YDROC-51 public health emergency)    Patient and physician are located in their individual locations. This is visit is completed via EVOF[employer] application []/ Doxy. me[] / Telephone [x]     HPI:    Mt King (:  1970) has requested an audio/video evaluation for the following concern(s):      Has chronic obstructive lung disease due to chronic bronchitis and emphysema. She has been treated with Trelegy and albuterol. We did stop her inhaled steroids. He has a history of chronic smoking continue to smoke in spite of repeated advised to give up smoking. Blanca Neighbours He has agreed to take the Covid vaccine. The sputum is nonpurulent no blood in it no chest pain no fever the frequency of using the rescue inhaler is significantly decreased. Has appetite has improved. There is no pedal edema no thromboembolic process no chest pain no angina. A chest x-ray revealed pleural plaque at the right base which was present in the previous CT scans and x-rays also. She had a CT done in later part of last year and November. We will get a repeat CT done in November of this year to rule out any cancers you that is screening for lung cancer    He was advised to take adequate precaution against exposure to the cold weather. Review of Systems    Prior to Visit Medications    Medication Sig Taking?  Authorizing Provider   clonazePAM (KLONOPIN) 0.5 MG tablet  Yes Historical Provider, MD   albuterol sulfate HFA (PROVENTIL HFA) 108 (90 Base) MCG/ACT inhaler Inhale 2 puffs into the lungs every 6 hours as needed for Wheezing or Shortness of Breath Rescue inhaler Yes Rohith Grullon MD   rosuvastatin (CRESTOR) 10 MG tablet Take 1 tablet by mouth nightly Yes Rohith Grullon MD   traZODone (DESYREL) 150 MG tablet Take 75 mg by mouth nightly  Yes Historical Provider, MD   escitalopram (LEXAPRO) 20 MG tablet Take 20 mg by mouth daily  Yes Historical Provider, MD   QUEtiapine (SEROQUEL) 200 MG tablet Take 1 tablet by mouth 2 times daily at 0800 and 1400 Yes Brennon Dunbar MD   QUEtiapine (SEROQUEL) 400 MG tablet Take 1 tablet by mouth daily  Patient taking differently: Take 400 mg by mouth nightly  Yes Tonie Acosta MD   EPINEPHrine (EPIPEN) 0.3 MG/0.3ML SOAJ injection Use as directed for allergic reaction Yes Katherine Faye DO       Social History     Tobacco Use    Smoking status: Current Every Day Smoker     Packs/day: 1.00     Years: 30.00     Pack years: 30.00     Types: Cigarettes    Smokeless tobacco: Never Used   Substance Use Topics    Alcohol use: Yes     Alcohol/week: 0.0 standard drinks     Comment: sociably    Drug use: No            RECORD REVIEW: Previous medical records were reviewed at today's visit.     Wt Readings from Last 3 Encounters:   11/17/21 110 lb (49.9 kg)   07/23/20 110 lb (49.9 kg)   05/11/20 110 lb (49.9 kg)       Results for orders placed or performed during the hospital encounter of 09/27/21   Lipid Panel   Result Value Ref Range    Cholesterol 273 (H) <200 mg/dL    HDL 54 >40 mg/dL    LDL Cholesterol 194 (H) 0 - 130 mg/dL    Chol/HDL Ratio 5.1 (H) <5    Triglycerides 126 <150 mg/dL    VLDL NOT REPORTED 1 - 30 mg/dL   TSH without Reflex   Result Value Ref Range    TSH 3.25 0.30 - 5.00 mIU/L   CBC   Result Value Ref Range    WBC 6.4 3.5 - 11.0 k/uL    RBC 4.18 4.0 - 5.2 m/uL    Hemoglobin 12.1 12.0 - 16.0 g/dL    Hematocrit 36.8 36 - 46 %    MCV 88.1 80 - 100 fL    MCH 28.8 26 - 34 pg    MCHC 32.7 31 - 37 g/dL    RDW 14.4 11.5 - 14.9 %    Platelets 551 831 - 851 k/uL    MPV 7.2 6.0 - 12.0 fL    NRBC Automated NOT REPORTED per 100 WBC   Comprehensive Metabolic Panel   Result Value Ref Range    Glucose 101 (H) 70 - 99 mg/dL    BUN 17 6 - 20 mg/dL    CREATININE 1.15 (H) 0.50 - 0.90 mg/dL    Bun/Cre Ratio NOT REPORTED 9 - 20    Calcium 9.5 8.6 - 10.4 mg/dL    Sodium 140 135 - 144 mmol/L    Potassium 3.9 3.7 - 5.3 mmol/L    Chloride 105 98 - 107 mmol/L CO2 23 20 - 31 mmol/L    Anion Gap 12 9 - 17 mmol/L    Alkaline Phosphatase 89 35 - 104 U/L    ALT 6 5 - 33 U/L    AST 15 <32 U/L    Total Bilirubin 0.22 (L) 0.3 - 1.2 mg/dL    Total Protein 7.9 6.4 - 8.3 g/dL    Albumin 4.7 3.5 - 5.2 g/dL    Albumin/Globulin Ratio NOT REPORTED 1.0 - 2.5    GFR Non- 50 (L) >60 mL/min    GFR African American >60 >60 mL/min    GFR Comment          GFR Staging NOT REPORTED    Hepatitis C Antibody   Result Value Ref Range    Hepatitis C Ab NONREACTIVE NONREACTIVE       No results found. PHYSICAL EXAMINATION:  Due to this being a TeleHealth encounter, evaluation of the following organ systems is limited: Vitals/Constitutional/EENT/Resp/CV/GI//MS/Neuro/Skin/Heme-Lymph-Imm. Constitutional: [x] Appears well-developed and well-nourished. [] Abnormal  Mental status  [x] Alert and awake  [x] Oriented to person/place/time [x]Able to follow commands    [x] No apparent distress      Eyes:  EOM    [x]  Normal  [] Abnormal-  Sclera  [x]  Normal  [] Abnormal -         Discharge [x]  None visible  [] Abnormal -    HENT:   [x] Normocephalic, atraumatic. [] Abnormal shaped head   [x] Mouth/Throat: Mucous membranes are moist.     Ears [x] Normal  [] Abnormal-    Neck: [x] Normal range of motion [x] Supple [x] No visualized mass. Pulmonary/Chest: [x] Respiratory effort normal.  [x] No visualized signs of difficulty breathing or respiratory distress        [] Abnormal      Musculoskeletal:   [x] Normal range of motion. [x] Normal gait with no signs of ataxia. [x]  No signs of cyanosis of the peripheral portions of extremities.          [] Abnormal       Neurological:        [x] Normal cranial nerve (limited exam to video visit) [x] No focal weakness observed       [] Abnormal          Speech       [x] Normal   [] Abnormal     Skin:        [x] No rash on visible skin  [x] Normal  [] Abnormal     Psychiatric:       [x] Normal  [] Abnormal        [x] Normal Mood  [] Established Patient who has not had a related appointment within my department in the past 7 days or scheduled within the next 24 hours.     Total Time: minutes: 21-30 minutes    Note: not billable if this call serves to triage the patient into an appointment for the relevant concern

## 2022-03-14 DIAGNOSIS — E78.5 HYPERLIPIDEMIA WITH TARGET LDL LESS THAN 100: ICD-10-CM

## 2022-03-14 RX ORDER — ROSUVASTATIN CALCIUM 10 MG/1
10 TABLET, COATED ORAL NIGHTLY
Qty: 90 TABLET | Refills: 3 | Status: SHIPPED | OUTPATIENT
Start: 2022-03-14 | End: 2022-03-28 | Stop reason: ALTCHOICE

## 2022-03-14 NOTE — TELEPHONE ENCOUNTER
Please Approve or Refuse.   Send to Pharmacy per Pt's Request: Lisa Ochoa     Next Visit Date:  5/25/2022   Last Visit Date: 12/16/2021    Hemoglobin A1C (%)   Date Value   07/06/2018 5.4   06/23/2017 5.1             ( goal A1C is < 7)   BP Readings from Last 3 Encounters:   11/17/21 96/69   07/23/20 130/87   05/11/20 113/75          (goal 120/80)  BUN   Date Value Ref Range Status   09/27/2021 17 6 - 20 mg/dL Final     CREATININE   Date Value Ref Range Status   09/27/2021 1.15 (H) 0.50 - 0.90 mg/dL Final     Potassium   Date Value Ref Range Status   09/27/2021 3.9 3.7 - 5.3 mmol/L Final

## 2022-03-28 DIAGNOSIS — E78.5 HYPERLIPIDEMIA WITH TARGET LDL LESS THAN 100: Primary | ICD-10-CM

## 2022-03-28 RX ORDER — PRAVASTATIN SODIUM 20 MG
20 TABLET ORAL EVERY EVENING
Qty: 90 TABLET | Refills: 3 | Status: SHIPPED | OUTPATIENT
Start: 2022-03-28

## 2022-03-28 NOTE — TELEPHONE ENCOUNTER
----- Message from Fritz Fuller sent at 3/28/2022 11:35 AM EDT -----  Subject: Medication Problem    QUESTIONS  Name of Medication? rosuvastatin (CRESTOR) 10 MG tablet  Patient-reported dosage and instructions? 10 MG 1x nightly  What question or problem do you have with the medication? Pt wants to know   if this RX is causing joint pain, her hips, ankles, and other joints have   started hurting since she started taking this-usually symptoms start   within an hour of taking this. Please call her back   Preferred Pharmacy? [id number] [address]  Pharmacy phone number (if available)? [phone]  Additional Information for Provider?   ---------------------------------------------------------------------------  --------------  [address]  What is the best way for the office to contact you? Do not leave any   message, patient will call back for answer  Preferred Call Back Phone Number? [phone]  ---------------------------------------------------------------------------  --------------  SCRIPT ANSWERS  Relationship to Patient?  Self

## 2022-03-28 NOTE — TELEPHONE ENCOUNTER
Yes, Crestor can cause muscle aches and joint pains  We can try to give her a milder statin like pravastatin, I will send a new 1 to the pharmacy, stop Crestor, start pravastatin small dosage, her LDL was very high, dangerously high    Lab Results   Component Value Date    LDLCHOLESTEROL 194 (H) 09/27/2021       Allergies   Allergen Reactions    Bee Pollen Swelling    Neosporin [Neomycin-Polymyxin-Gramicidin]      hives    Triple Antibiotic [Neomycin-Bacitracin Zn-Polymyx]      hives     Please let the patient know to  prescription from the pharmacy. Orders Placed This Encounter   Medications    pravastatin (PRAVACHOL) 20 MG tablet     Sig: Take 1 tablet by mouth every evening **Stop Crestor**. For high cholesterol     Dispense:  90 tablet     Refill:  3         [facility] [place] [address] -  [phone]  [address] [place] [id number]  Phone: [phone] Fax: [phone]      No orders of the defined types were placed in this encounter. Future Appointments   Date Time Provider Yaneli Harris   4/27/2022  3:00 PM [facility]   5/25/2022  4:00 PM Ramírez Diaz MD [facility]       Thank you!

## 2022-03-30 NOTE — TELEPHONE ENCOUNTER
ATTEMPTED TO CALL PATIENT NO ANSWER OR VM SET UP.  WILL AWAIT FOR A CALL BACK , WILL ALSO ADDRESS ON UPCOMING APPT 05/25

## 2023-03-25 DIAGNOSIS — E78.5 HYPERLIPIDEMIA WITH TARGET LDL LESS THAN 100: ICD-10-CM

## 2023-03-27 RX ORDER — PRAVASTATIN SODIUM 20 MG
TABLET ORAL
Qty: 90 TABLET | Refills: 3 | OUTPATIENT
Start: 2023-03-27

## 2023-03-27 NOTE — TELEPHONE ENCOUNTER
Please Approve or Refuse.   Send to Pharmacy per Pt's Request:      Next Visit Date:  Visit date not found   Last Visit Date: 12/16/2021    Hemoglobin A1C (%)   Date Value   07/06/2018 5.4   06/23/2017 5.1             ( goal A1C is < 7)   BP Readings from Last 3 Encounters:   11/17/21 96/69   07/23/20 130/87   05/11/20 113/75          (goal 120/80)  BUN   Date Value Ref Range Status   09/27/2021 17 6 - 20 mg/dL Final     Creatinine   Date Value Ref Range Status   09/27/2021 1.15 (H) 0.50 - 0.90 mg/dL Final     Potassium   Date Value Ref Range Status   09/27/2021 3.9 3.7 - 5.3 mmol/L Final

## 2023-06-28 DIAGNOSIS — E78.5 HYPERLIPIDEMIA WITH TARGET LDL LESS THAN 100: ICD-10-CM

## 2023-06-28 RX ORDER — PRAVASTATIN SODIUM 20 MG
20 TABLET ORAL EVERY EVENING
Qty: 90 TABLET | Refills: 3 | Status: SHIPPED | OUTPATIENT
Start: 2023-06-28

## 2023-09-08 ENCOUNTER — TELEPHONE (OUTPATIENT)
Dept: FAMILY MEDICINE CLINIC | Age: 53
End: 2023-09-08

## 2023-09-08 NOTE — TELEPHONE ENCOUNTER
Attempted to contact pt regarding appt, phone # [phone] is not valid. Pt has not been seen since 2021.

## 2024-02-05 ENCOUNTER — TELEPHONE (OUTPATIENT)
Dept: FAMILY MEDICINE CLINIC | Age: 54
End: 2024-02-05

## 2024-02-05 NOTE — TELEPHONE ENCOUNTER
----- Message from Jonathon Wetzel sent at 2/5/2024  9:27 AM EST -----  Subject: Referral Request    Reason for referral request? lab work orders for her cholesteral one of   her medications she takes from pych makes her choleseral levels go up  Provider patient wants to be referred to(if known):     Provider Phone Number(if known):    Additional Information for Provider?   ---------------------------------------------------------------------------  --------------  CALL BACK INFO    [phone]; OK to leave message on voicemail  ---------------------------------------------------------------------------  --------------

## 2024-02-05 NOTE — TELEPHONE ENCOUNTER
Previous lab work ahead and order has .  Patient has not been seen in the office in 2 years.  She will need to make an appointment before lab work will be ordered again.

## 2024-02-26 ENCOUNTER — TELEMEDICINE (OUTPATIENT)
Dept: PRIMARY CARE CLINIC | Age: 54
End: 2024-02-26
Payer: COMMERCIAL

## 2024-02-26 DIAGNOSIS — R30.0 DYSURIA: Primary | ICD-10-CM

## 2024-02-26 DIAGNOSIS — N39.41 URGENCY INCONTINENCE: ICD-10-CM

## 2024-02-26 DIAGNOSIS — R35.0 FREQUENCY OF URINATION: ICD-10-CM

## 2024-02-26 PROCEDURE — 3017F COLORECTAL CA SCREEN DOC REV: CPT | Performed by: NURSE PRACTITIONER

## 2024-02-26 PROCEDURE — G8427 DOCREV CUR MEDS BY ELIG CLIN: HCPCS | Performed by: NURSE PRACTITIONER

## 2024-02-26 PROCEDURE — G8421 BMI NOT CALCULATED: HCPCS | Performed by: NURSE PRACTITIONER

## 2024-02-26 PROCEDURE — G8484 FLU IMMUNIZE NO ADMIN: HCPCS | Performed by: NURSE PRACTITIONER

## 2024-02-26 PROCEDURE — 99213 OFFICE O/P EST LOW 20 MIN: CPT | Performed by: NURSE PRACTITIONER

## 2024-02-26 PROCEDURE — 4004F PT TOBACCO SCREEN RCVD TLK: CPT | Performed by: NURSE PRACTITIONER

## 2024-02-26 RX ORDER — NITROFURANTOIN 25; 75 MG/1; MG/1
100 CAPSULE ORAL 2 TIMES DAILY
Qty: 10 CAPSULE | Refills: 0 | Status: SHIPPED | OUTPATIENT
Start: 2024-02-26 | End: 2024-03-02

## 2024-02-26 NOTE — PROGRESS NOTES
Eugenie Estrada (:  1970) is a Established patient, here for evaluation of the following:    Urinary Tract Infection (Symptoms started yesterday, pt c/o frequency, urgency, dysuria, feeling like she is not fully emptying her bladder. Denies abdominal/back pain. )       Assessment & Plan:  Below is the assessment and plan developed based on review of pertinent history, physical exam, labs, studies, and medications.  1. Dysuria  If no improvement/or worsening of condition, notify office for further follow up.  Drink plenty of fluids. (Limit caffeine, spicy foods, and alcohol).  Take medication as prescribed.   You make drink cranberry juice.  Take probiotics as directed.  May take Tylenol for fever.  Make sure to wipe from front to back.  Practice good hand hygiene.   Empty bladder as soon as you have the urge to do so.    -     Culture, Urine  -     POCT Urinalysis no Micro; Future  -     nitrofurantoin, macrocrystal-monohydrate, (MACROBID) 100 MG capsule; Take 1 capsule by mouth 2 times daily for 5 days, Disp-10 capsule, R-0Normal  2. Frequency of urination  -     Culture, Urine  -     POCT Urinalysis no Micro; Future  -     nitrofurantoin, macrocrystal-monohydrate, (MACROBID) 100 MG capsule; Take 1 capsule by mouth 2 times daily for 5 days, Disp-10 capsule, R-0Normal  3. Urgency incontinence  -     Culture, Urine  -     POCT Urinalysis no Micro; Future  -     nitrofurantoin, macrocrystal-monohydrate, (MACROBID) 100 MG capsule; Take 1 capsule by mouth 2 times daily for 5 days, Disp-10 capsule, R-0Normal    Return if symptoms worsen or fail to improve.  The patient would benefit from future follow up with their usual PCP. As of the end of their Virtualist Visit today, follow up visit status is as follows: Patient to follow up as previously instructed by PCP    Subjective:   Urinary Tract Infection  Patient complains of dysuria, frequency, urgency, suprapubic pressure She has had symptoms for 2 days.  She took a

## 2024-03-13 ENCOUNTER — OFFICE VISIT (OUTPATIENT)
Dept: FAMILY MEDICINE CLINIC | Age: 54
End: 2024-03-13
Payer: COMMERCIAL

## 2024-03-13 VITALS
DIASTOLIC BLOOD PRESSURE: 66 MMHG | HEIGHT: 66 IN | OXYGEN SATURATION: 97 % | WEIGHT: 115.4 LBS | HEART RATE: 104 BPM | SYSTOLIC BLOOD PRESSURE: 102 MMHG | TEMPERATURE: 97.6 F | BODY MASS INDEX: 18.54 KG/M2

## 2024-03-13 DIAGNOSIS — Z12.31 ENCOUNTER FOR SCREENING MAMMOGRAM FOR BREAST CANCER: ICD-10-CM

## 2024-03-13 DIAGNOSIS — R53.82 CHRONIC FATIGUE: Primary | ICD-10-CM

## 2024-03-13 DIAGNOSIS — E78.5 HYPERLIPIDEMIA WITH TARGET LDL LESS THAN 100: ICD-10-CM

## 2024-03-13 DIAGNOSIS — Z12.11 SCREEN FOR COLON CANCER: ICD-10-CM

## 2024-03-13 DIAGNOSIS — Z78.0 POSTMENOPAUSAL: ICD-10-CM

## 2024-03-13 DIAGNOSIS — Z87.891 PERSONAL HISTORY OF TOBACCO USE: ICD-10-CM

## 2024-03-13 DIAGNOSIS — J94.8: ICD-10-CM

## 2024-03-13 DIAGNOSIS — J43.1 PANLOBULAR EMPHYSEMA (HCC): ICD-10-CM

## 2024-03-13 DIAGNOSIS — R73.9 HYPERGLYCEMIA: ICD-10-CM

## 2024-03-13 DIAGNOSIS — Z87.09 HISTORY OF PNEUMOTHORAX: ICD-10-CM

## 2024-03-13 DIAGNOSIS — F33.1 MODERATE EPISODE OF RECURRENT MAJOR DEPRESSIVE DISORDER (HCC): ICD-10-CM

## 2024-03-13 DIAGNOSIS — R00.0 TACHYCARDIA: ICD-10-CM

## 2024-03-13 PROCEDURE — G8420 CALC BMI NORM PARAMETERS: HCPCS | Performed by: FAMILY MEDICINE

## 2024-03-13 PROCEDURE — 3023F SPIROM DOC REV: CPT | Performed by: FAMILY MEDICINE

## 2024-03-13 PROCEDURE — G0296 VISIT TO DETERM LDCT ELIG: HCPCS | Performed by: FAMILY MEDICINE

## 2024-03-13 PROCEDURE — 99214 OFFICE O/P EST MOD 30 MIN: CPT | Performed by: FAMILY MEDICINE

## 2024-03-13 PROCEDURE — 3017F COLORECTAL CA SCREEN DOC REV: CPT | Performed by: FAMILY MEDICINE

## 2024-03-13 PROCEDURE — G8427 DOCREV CUR MEDS BY ELIG CLIN: HCPCS | Performed by: FAMILY MEDICINE

## 2024-03-13 PROCEDURE — G8484 FLU IMMUNIZE NO ADMIN: HCPCS | Performed by: FAMILY MEDICINE

## 2024-03-13 PROCEDURE — 4004F PT TOBACCO SCREEN RCVD TLK: CPT | Performed by: FAMILY MEDICINE

## 2024-03-13 RX ORDER — ALBUTEROL SULFATE 90 UG/1
2 AEROSOL, METERED RESPIRATORY (INHALATION) EVERY 6 HOURS PRN
Qty: 18 G | Refills: 1 | Status: SHIPPED | OUTPATIENT
Start: 2024-03-13

## 2024-03-13 RX ORDER — PRAVASTATIN SODIUM 20 MG
20 TABLET ORAL EVERY EVENING
Qty: 90 TABLET | Refills: 3 | Status: SHIPPED | OUTPATIENT
Start: 2024-03-13

## 2024-03-13 SDOH — ECONOMIC STABILITY: FOOD INSECURITY: WITHIN THE PAST 12 MONTHS, THE FOOD YOU BOUGHT JUST DIDN'T LAST AND YOU DIDN'T HAVE MONEY TO GET MORE.: NEVER TRUE

## 2024-03-13 SDOH — ECONOMIC STABILITY: FOOD INSECURITY: WITHIN THE PAST 12 MONTHS, YOU WORRIED THAT YOUR FOOD WOULD RUN OUT BEFORE YOU GOT MONEY TO BUY MORE.: NEVER TRUE

## 2024-03-13 SDOH — ECONOMIC STABILITY: INCOME INSECURITY: HOW HARD IS IT FOR YOU TO PAY FOR THE VERY BASICS LIKE FOOD, HOUSING, MEDICAL CARE, AND HEATING?: NOT HARD AT ALL

## 2024-03-13 ASSESSMENT — PATIENT HEALTH QUESTIONNAIRE - PHQ9
2. FEELING DOWN, DEPRESSED OR HOPELESS: NEARLY EVERY DAY
SUM OF ALL RESPONSES TO PHQ QUESTIONS 1-9: 15
7. TROUBLE CONCENTRATING ON THINGS, SUCH AS READING THE NEWSPAPER OR WATCHING TELEVISION: MORE THAN HALF THE DAYS
SUM OF ALL RESPONSES TO PHQ QUESTIONS 1-9: 15
10. IF YOU CHECKED OFF ANY PROBLEMS, HOW DIFFICULT HAVE THESE PROBLEMS MADE IT FOR YOU TO DO YOUR WORK, TAKE CARE OF THINGS AT HOME, OR GET ALONG WITH OTHER PEOPLE: SOMEWHAT DIFFICULT
SUM OF ALL RESPONSES TO PHQ QUESTIONS 1-9: 15
5. POOR APPETITE OR OVEREATING: NOT AT ALL
1. LITTLE INTEREST OR PLEASURE IN DOING THINGS: NEARLY EVERY DAY
6. FEELING BAD ABOUT YOURSELF - OR THAT YOU ARE A FAILURE OR HAVE LET YOURSELF OR YOUR FAMILY DOWN: NEARLY EVERY DAY
4. FEELING TIRED OR HAVING LITTLE ENERGY: MORE THAN HALF THE DAYS
8. MOVING OR SPEAKING SO SLOWLY THAT OTHER PEOPLE COULD HAVE NOTICED. OR THE OPPOSITE, BEING SO FIGETY OR RESTLESS THAT YOU HAVE BEEN MOVING AROUND A LOT MORE THAN USUAL: NOT AT ALL
SUM OF ALL RESPONSES TO PHQ9 QUESTIONS 1 & 2: 6
3. TROUBLE FALLING OR STAYING ASLEEP: MORE THAN HALF THE DAYS
9. THOUGHTS THAT YOU WOULD BE BETTER OFF DEAD, OR OF HURTING YOURSELF: NOT AT ALL
SUM OF ALL RESPONSES TO PHQ QUESTIONS 1-9: 15

## 2024-03-13 NOTE — PROGRESS NOTES
Visit Information    Have you changed or started any medications since your last visit including any over-the-counter medicines, vitamins, or herbal medicines? no   Have you stopped taking any of your medications? Is so, why? -  no  Are you having any side effects from any of your medications? - no    Have you seen any other physician or provider since your last visit?  yes -    Have you had any other diagnostic tests since your last visit?  no   Have you been seen in the emergency room and/or had an admission in a hospital since we last saw you?  no   Have you had your routine dental cleaning in the past 6 months?  no     Do you have an active MyChart account? If no, what is the barrier?  Yes    Patient Care Team:  Amy Joseph MD as PCP - General  Amy Joseph MD as PCP - Empaneled Provider  Hugh Patterson MD as Surgeon (Orthopedic Surgery)  Bryce Castillo MD as Consulting Physician (Pulmonology)  Raj Bolaños MD as Consulting Physician (Pulmonary Disease)    Medical History Review  Past Medical, Family, and Social History reviewed and does contribute to the patient presenting condition    Health Maintenance   Topic Date Due    Hepatitis B vaccine (1 of 3 - 3-dose series) Never done    COVID-19 Vaccine (1) Never done    Depression Monitoring  Never done    DTaP/Tdap/Td vaccine (1 - Tdap) Never done    Colorectal Cancer Screen  Never done    Pneumococcal 0-64 years Vaccine (2 - PCV) 09/29/2018    Shingles vaccine (1 of 2) Never done    Low dose CT lung screening &/or counseling  Never done    Lipids  09/27/2022    Flu vaccine (1) 08/01/2023    Breast cancer screen  09/27/2023    Annual Wellness Visit (Medicare Advantage)  Never done    Hepatitis C screen  Completed    HIV screen  Completed    Hepatitis A vaccine  Aged Out    Hib vaccine  Aged Out    Polio vaccine  Aged Out    Meningococcal (ACWY) vaccine  Aged Out    Cervical cancer screen  Discontinued             
puffs into the lungs every 6 hours as needed for Wheezing or Shortness of Breath Rescue inhaler     Dispense:  18 g     Refill:  1    DISCONTD: tiotropium (SPIRIVA RESPIMAT) 2.5 MCG/ACT AERS inhaler     Sig: Inhale 2 puffs into the lungs daily     Dispense:  4 g     Refill:  3    tiotropium (SPIRIVA HANDIHALER) 18 MCG inhalation capsule     Sig: Inhale 1 capsule into the lungs daily     Dispense:  30 capsule     Refill:  0       Medications Discontinued During This Encounter   Medication Reason    albuterol sulfate HFA (PROVENTIL HFA) 108 (90 Base) MCG/ACT inhaler REORDER    pravastatin (PRAVACHOL) 20 MG tablet REORDER    tiotropium (SPIRIVA RESPIMAT) 2.5 MCG/ACT AERS inhaler Cost of medication           On this date 3/13/2024 I have spent 39 minutes reviewing previous notes, test results and face to face with the patient discussing the diagnosis and importance of compliance with the treatment plan as well as documenting on the day of the visit.    This note was completed by using the assistance of a speech-recognition program. However, inadvertent computerized transcription errors may be present. Although every effort was made to ensure accuracy, no guarantees can be provided that every mistake has been identified and corrected by editing.      An electronic signature was used to authenticate this note.  Electronically signed by Amy Joseph MD on 3/18/2024 at 7:42 PM

## 2024-03-14 ENCOUNTER — TELEPHONE (OUTPATIENT)
Dept: FAMILY MEDICINE CLINIC | Age: 54
End: 2024-03-14

## 2024-03-14 PROBLEM — R00.0 TACHYCARDIA: Status: ACTIVE | Noted: 2024-03-14

## 2024-03-14 PROBLEM — J94.8: Status: ACTIVE | Noted: 2024-03-14

## 2024-03-14 PROBLEM — Z87.891 PERSONAL HISTORY OF TOBACCO USE: Status: ACTIVE | Noted: 2024-03-14

## 2024-03-14 PROBLEM — Z87.09 HISTORY OF PNEUMOTHORAX: Status: ACTIVE | Noted: 2024-03-14

## 2024-03-14 RX ORDER — TIOTROPIUM BROMIDE 18 UG/1
18 CAPSULE ORAL; RESPIRATORY (INHALATION) DAILY
Qty: 30 CAPSULE | Refills: 0 | Status: SHIPPED | OUTPATIENT
Start: 2024-03-14

## 2024-03-14 NOTE — TELEPHONE ENCOUNTER
I sent another type of screening which might be covered, to let us know if any issues  Thank you!      Future Appointments   Date Time Provider Department Center   6/13/2024  9:30 AM [facility]

## 2024-03-27 ENCOUNTER — TELEPHONE (OUTPATIENT)
Dept: ONCOLOGY | Age: 54
End: 2024-03-27

## 2024-04-17 ENCOUNTER — TELEPHONE (OUTPATIENT)
Dept: FAMILY MEDICINE CLINIC | Age: 54
End: 2024-04-17

## 2024-04-17 NOTE — TELEPHONE ENCOUNTER
Noted. Thank you!  Please print order for DEXA scan and the tactile phone number in the mail to the patient's house to have it scheduled    Future Appointments   Date Time Provider Department Center   4/23/2024 11:15 AM [facility] RESP THERAPY  [facility] [place]   6/13/2024  9:30 AM Amy Joseph MD [facility]

## 2024-04-17 NOTE — TELEPHONE ENCOUNTER
PROVIDER FEEDBACK LOOP CALLED 3X     Patient:Eugenie Estrada  : 1970  Referring Provider: LAYO CASE  Referral Type:  Imaging     Procedures:  [id number] - DEXA BONE DENSITY AXIAL SKELETON  Date Service Ordered 3/13/2024        We were unable to reach Eugenie Estrada to schedule the test ordered by your office after 3 outreach attempts via either text, email and/or phone call.  Please call/follow up with your patient to explain the significance of the ordered test and direct the patient to call Central Scheduling to schedule the test at their earliest convenience.     Please complete one of the following actions from Quick Actions buttons:     Route to Provider:  Route message to ordering provider to seek next steps in care plan.     Telephone Encounter:  Telephone encounter will open.  Call patient to explain significance of ordered test and direct patient to call Central Scheduling to schedule test then Document details of call.     Open Referral:  Review referral notes or details if needed.     Close Referral:  Referral will open.  Document in Notes section of referral why the referral is being closed.  Examples of referral closure:  Patient had test done outside of of an office in the [facility], Patient refuses test, Patient no longer having symptoms, Unable to reach patient.  Only close the referral if you are sure the test will not proceed.     Thank you     Pre-Access Scheduling Team      Summary    Auto: [id number] PROVIDER FEEDBACK LOOP CALLED 3X

## 2024-08-04 ENCOUNTER — HOSPITAL ENCOUNTER (EMERGENCY)
Age: 54
Discharge: HOME | End: 2024-08-04
Attending: EMERGENCY MEDICINE
Payer: COMMERCIAL

## 2024-08-04 ENCOUNTER — APPOINTMENT (OUTPATIENT)
Dept: GENERAL RADIOLOGY | Age: 54
End: 2024-08-04
Payer: COMMERCIAL

## 2024-08-04 VITALS
SYSTOLIC BLOOD PRESSURE: 135 MMHG | TEMPERATURE: 97.7 F | DIASTOLIC BLOOD PRESSURE: 80 MMHG | RESPIRATION RATE: 21 BRPM | HEART RATE: 92 BPM | OXYGEN SATURATION: 99 %

## 2024-08-04 DIAGNOSIS — J44.1 COPD EXACERBATION (HCC): Primary | ICD-10-CM

## 2024-08-04 LAB
ALBUMIN SERPL-MCNC: 3.8 G/DL (ref 3.5–5.2)
ALP SERPL-CCNC: 109 U/L (ref 35–104)
ALT SERPL-CCNC: 9 U/L (ref 5–33)
ANION GAP SERPL CALCULATED.3IONS-SCNC: 17 MMOL/L (ref 9–17)
AST SERPL-CCNC: 16 U/L
BASOPHILS # BLD: 0.1 K/UL (ref 0–0.2)
BASOPHILS NFR BLD: 1 % (ref 0–2)
BILIRUB SERPL-MCNC: <0.2 MG/DL (ref 0.3–1.2)
BODY TEMPERATURE: 37
CALCIUM SERPL-MCNC: 9 MG/DL (ref 8.6–10.4)
CHLORIDE SERPL-SCNC: 103 MMOL/L (ref 98–107)
COHGB MFR BLD: 5.4 % (ref 0–5)
CREAT SERPL-MCNC: 1 MG/DL (ref 0.5–0.9)
D DIMER PPP FEU-MCNC: 0.4 UG/ML FEU (ref 0–0.59)
EOSINOPHIL # BLD: 0.4 K/UL (ref 0–0.4)
EOSINOPHILS RELATIVE PERCENT: 6 % (ref 0–4)
ERYTHROCYTE [DISTWIDTH] IN BLOOD BY AUTOMATED COUNT: 15.5 % (ref 11.5–14.9)
GFR, ESTIMATED: 68 ML/MIN/1.73M2
GLUCOSE SERPL-MCNC: 98 MG/DL (ref 70–99)
HCO3 VENOUS: 20.3 MMOL/L (ref 24–30)
HCT VFR BLD AUTO: 35.2 % (ref 36–46)
HGB BLD-MCNC: 11.3 G/DL (ref 12–16)
INR PPP: 0.9
LIPASE SERPL-CCNC: 43 U/L (ref 13–60)
LYMPHOCYTES NFR BLD: 1.6 K/UL (ref 1–4.8)
LYMPHOCYTES RELATIVE PERCENT: 27 % (ref 24–44)
MAGNESIUM SERPL-MCNC: 2.2 MG/DL (ref 1.6–2.6)
MCH RBC QN AUTO: 29.1 PG (ref 26–34)
MCHC RBC AUTO-ENTMCNC: 32.1 G/DL (ref 31–37)
MCV RBC AUTO: 90.9 FL (ref 80–100)
METHEMOGLOBIN: 1 % (ref 0–1.9)
MONOCYTES NFR BLD: 0.4 K/UL (ref 0.1–1.3)
MONOCYTES NFR BLD: 7 % (ref 1–7)
NEGATIVE BASE EXCESS, VEN: 2.2 MMOL/L (ref 0–2)
NEUTROPHILS NFR BLD: 59 % (ref 36–66)
NEUTS SEG NFR BLD: 3.4 K/UL (ref 1.3–9.1)
O2 SAT, VEN: 91.1 % (ref 60–85)
PCO2 VENOUS: 23.7 MM HG (ref 39–55)
PH VENOUS: 7.54 (ref 7.32–7.42)
PLATELET # BLD AUTO: 233 K/UL (ref 150–450)
PO2 VENOUS: 103 MM HG (ref 30–50)
POTASSIUM SERPL-SCNC: 4.7 MMOL/L (ref 3.7–5.3)
PROT SERPL-MCNC: 7.3 G/DL (ref 6.4–8.3)
PROTHROMBIN TIME: 12.1 SEC (ref 11.8–14.6)
RBC # BLD AUTO: 3.87 M/UL (ref 4–5.2)
SODIUM SERPL-SCNC: 138 MMOL/L (ref 135–144)
TEXT FOR RESPIRATORY: ABNORMAL
TROPONIN I SERPL HS-MCNC: <6 NG/L (ref 0–14)
TROPONIN I SERPL HS-MCNC: <6 NG/L (ref 0–14)
WBC OTHER # BLD: 5.7 K/UL (ref 3.5–11)

## 2024-08-04 PROCEDURE — 82805 BLOOD GASES W/O2 SATURATION: CPT

## 2024-08-04 PROCEDURE — 85379 FIBRIN DEGRADATION QUANT: CPT

## 2024-08-04 PROCEDURE — 94640 AIRWAY INHALATION TREATMENT: CPT

## 2024-08-04 PROCEDURE — 83690 ASSAY OF LIPASE: CPT

## 2024-08-04 PROCEDURE — 36415 COLL VENOUS BLD VENIPUNCTURE: CPT

## 2024-08-04 PROCEDURE — 96374 THER/PROPH/DIAG INJ IV PUSH: CPT

## 2024-08-04 PROCEDURE — 84484 ASSAY OF TROPONIN QUANT: CPT

## 2024-08-04 PROCEDURE — 2580000003 HC RX 258: Performed by: EMERGENCY MEDICINE

## 2024-08-04 PROCEDURE — 6370000000 HC RX 637 (ALT 250 FOR IP): Performed by: EMERGENCY MEDICINE

## 2024-08-04 PROCEDURE — 80053 COMPREHEN METABOLIC PANEL: CPT

## 2024-08-04 PROCEDURE — 85610 PROTHROMBIN TIME: CPT

## 2024-08-04 PROCEDURE — 82800 BLOOD PH: CPT

## 2024-08-04 PROCEDURE — 99285 EMERGENCY DEPT VISIT HI MDM: CPT

## 2024-08-04 PROCEDURE — 83735 ASSAY OF MAGNESIUM: CPT

## 2024-08-04 PROCEDURE — 71045 X-RAY EXAM CHEST 1 VIEW: CPT

## 2024-08-04 PROCEDURE — 93005 ELECTROCARDIOGRAM TRACING: CPT | Performed by: EMERGENCY MEDICINE

## 2024-08-04 PROCEDURE — 94760 N-INVAS EAR/PLS OXIMETRY 1: CPT

## 2024-08-04 PROCEDURE — 85025 COMPLETE CBC W/AUTO DIFF WBC: CPT

## 2024-08-04 PROCEDURE — 6360000002 HC RX W HCPCS: Performed by: EMERGENCY MEDICINE

## 2024-08-04 RX ORDER — IPRATROPIUM BROMIDE AND ALBUTEROL SULFATE 2.5; .5 MG/3ML; MG/3ML
1 SOLUTION RESPIRATORY (INHALATION) EVERY 4 HOURS PRN
Status: DISCONTINUED | OUTPATIENT
Start: 2024-08-04 | End: 2024-08-04 | Stop reason: HOSPADM

## 2024-08-04 RX ORDER — PREDNISONE 20 MG/1
40 TABLET ORAL DAILY
Qty: 10 TABLET | Refills: 0 | Status: SHIPPED | OUTPATIENT
Start: 2024-08-04 | End: 2024-08-09

## 2024-08-04 RX ADMIN — WATER 125 MG: 1 INJECTION INTRAMUSCULAR; INTRAVENOUS; SUBCUTANEOUS at 18:01

## 2024-08-04 RX ADMIN — IPRATROPIUM BROMIDE AND ALBUTEROL SULFATE 1 DOSE: 2.5; .5 SOLUTION RESPIRATORY (INHALATION) at 17:59

## 2024-08-04 NOTE — PROGRESS NOTES
Aerosol given, patient spo2 97% on room air, breath sounds rhonchi throughout, patient states breathing feels about the same after aerosol treatment. Patient cough is congested.

## 2024-08-04 NOTE — ED PROVIDER NOTES
findings on labs or imaging feel she is appropriate for outpatient treatment will start on short course of steroid, patient states she does not need any refills of inhalers at this time    Discussed with patient need for follow-up with PCP and return precautions, patient voiced understanding comfortable with plan and discharge    Patient/Guardian was informed of their diagnosis and told to follow up with PCP  in 1-3 days. Patient demonstrates understanding and agreement with the plan. They were given the opportunity to ask questions and those questions were answered to the best of our ability with the available information. Patient/Guardian told to return to the ED for any new, worsening, changing or persistent symptoms.       This dictation was prepared using Dragon Medical voice recognition software.         CRITICAL CARE:       PROCEDURES:    Procedures      DATA FOR LAB AND RADIOLOGY TESTS ORDERED BELOW ARE REVIEWED BY THE ED CLINICIAN:    RADIOLOGY: All x-rays, CT, MRI, and formal ultrasound images (except ED bedside ultrasound) are read by the radiologist, see reports below, unless otherwise noted in MDM or here.  Reports below are reviewed by myself.  XR CHEST PORTABLE   Final Result   Emphysema without acute abnormality.             LABS: Lab orders shown below, the results are reviewed by myself, and all abnormals are listed below.  Labs Reviewed   CBC WITH AUTO DIFFERENTIAL - Abnormal; Notable for the following components:       Result Value    RBC 3.87 (*)     Hemoglobin 11.3 (*)     Hematocrit 35.2 (*)     RDW 15.5 (*)     Eosinophils % 6 (*)     All other components within normal limits   COMPREHENSIVE METABOLIC PANEL - Abnormal; Notable for the following components:    CO2 18 (*)     Creatinine 1.0 (*)     Total Bilirubin <0.2 (*)     Alkaline Phosphatase 109 (*)     All other components within normal limits   BLOOD GAS, VENOUS - Abnormal; Notable for the following components:    pH, [place] 7.542 (*)

## 2024-08-05 LAB
EKG ATRIAL RATE: 100 BPM
EKG P AXIS: 66 DEGREES
EKG P-R INTERVAL: 152 MS
EKG Q-T INTERVAL: 364 MS
EKG QRS DURATION: 74 MS
EKG QTC CALCULATION (BAZETT): 469 MS
EKG R AXIS: 67 DEGREES
EKG T AXIS: 68 DEGREES
EKG VENTRICULAR RATE: 100 BPM

## 2024-08-05 PROCEDURE — 93010 ELECTROCARDIOGRAM REPORT: CPT | Performed by: INTERNAL MEDICINE

## 2024-08-06 ENCOUNTER — APPOINTMENT (OUTPATIENT)
Dept: GENERAL RADIOLOGY | Age: 54
End: 2024-08-06
Payer: COMMERCIAL

## 2024-08-06 ENCOUNTER — HOSPITAL ENCOUNTER (EMERGENCY)
Age: 54
Discharge: HOME | End: 2024-08-06
Attending: EMERGENCY MEDICINE
Payer: COMMERCIAL

## 2024-08-06 VITALS
SYSTOLIC BLOOD PRESSURE: 110 MMHG | WEIGHT: 110 LBS | OXYGEN SATURATION: 90 % | RESPIRATION RATE: 19 BRPM | DIASTOLIC BLOOD PRESSURE: 66 MMHG | HEIGHT: 67 IN | HEART RATE: 95 BPM | BODY MASS INDEX: 17.27 KG/M2 | TEMPERATURE: 98 F

## 2024-08-06 DIAGNOSIS — J44.1 COPD EXACERBATION (HCC): Primary | ICD-10-CM

## 2024-08-06 LAB
ALBUMIN SERPL-MCNC: 4.2 G/DL (ref 3.5–5.2)
ALP SERPL-CCNC: 94 U/L (ref 35–104)
ALT SERPL-CCNC: 11 U/L (ref 5–33)
ANION GAP SERPL CALCULATED.3IONS-SCNC: 14 MMOL/L (ref 9–17)
AST SERPL-CCNC: 17 U/L
BASOPHILS # BLD: 0.1 K/UL (ref 0–0.2)
BASOPHILS NFR BLD: 1 % (ref 0–2)
BILIRUB SERPL-MCNC: <0.2 MG/DL (ref 0.3–1.2)
BUN SERPL-MCNC: 23 MG/DL (ref 6–20)
CALCIUM SERPL-MCNC: 9 MG/DL (ref 8.6–10.4)
CHLORIDE SERPL-SCNC: 106 MMOL/L (ref 98–107)
CO2 SERPL-SCNC: 19 MMOL/L (ref 20–31)
CREAT SERPL-MCNC: 0.9 MG/DL (ref 0.5–0.9)
EOSINOPHIL # BLD: 0 K/UL (ref 0–0.4)
EOSINOPHILS RELATIVE PERCENT: 0 % (ref 0–4)
ERYTHROCYTE [DISTWIDTH] IN BLOOD BY AUTOMATED COUNT: 15.2 % (ref 11.5–14.9)
GFR, ESTIMATED: 77 ML/MIN/1.73M2
GLUCOSE SERPL-MCNC: 98 MG/DL (ref 70–99)
HCT VFR BLD AUTO: 34.7 % (ref 36–46)
HGB BLD-MCNC: 11.4 G/DL (ref 12–16)
LYMPHOCYTES NFR BLD: 1.9 K/UL (ref 1–4.8)
LYMPHOCYTES RELATIVE PERCENT: 18 % (ref 24–44)
MCH RBC QN AUTO: 29.5 PG (ref 26–34)
MCHC RBC AUTO-ENTMCNC: 32.9 G/DL (ref 31–37)
MCV RBC AUTO: 89.7 FL (ref 80–100)
MONOCYTES NFR BLD: 0.6 K/UL (ref 0.1–1.3)
MONOCYTES NFR BLD: 5 % (ref 1–7)
NEUTROPHILS NFR BLD: 76 % (ref 36–66)
NEUTS SEG NFR BLD: 8.3 K/UL (ref 1.3–9.1)
PLATELET # BLD AUTO: 274 K/UL (ref 150–450)
PMV BLD AUTO: 6.7 FL (ref 6–12)
POTASSIUM SERPL-SCNC: 4 MMOL/L (ref 3.7–5.3)
PROT SERPL-MCNC: 7.4 G/DL (ref 6.4–8.3)
RBC # BLD AUTO: 3.87 M/UL (ref 4–5.2)
SODIUM SERPL-SCNC: 139 MMOL/L (ref 135–144)
TROPONIN I SERPL HS-MCNC: <6 NG/L (ref 0–14)
WBC OTHER # BLD: 10.8 K/UL (ref 3.5–11)

## 2024-08-06 PROCEDURE — 94761 N-INVAS EAR/PLS OXIMETRY MLT: CPT

## 2024-08-06 PROCEDURE — 96365 THER/PROPH/DIAG IV INF INIT: CPT

## 2024-08-06 PROCEDURE — 94640 AIRWAY INHALATION TREATMENT: CPT

## 2024-08-06 PROCEDURE — 93005 ELECTROCARDIOGRAM TRACING: CPT

## 2024-08-06 PROCEDURE — 84484 ASSAY OF TROPONIN QUANT: CPT

## 2024-08-06 PROCEDURE — 6370000000 HC RX 637 (ALT 250 FOR IP)

## 2024-08-06 PROCEDURE — 71045 X-RAY EXAM CHEST 1 VIEW: CPT

## 2024-08-06 PROCEDURE — 80053 COMPREHEN METABOLIC PANEL: CPT

## 2024-08-06 PROCEDURE — 99285 EMERGENCY DEPT VISIT HI MDM: CPT

## 2024-08-06 PROCEDURE — 96375 TX/PRO/DX INJ NEW DRUG ADDON: CPT

## 2024-08-06 PROCEDURE — 36415 COLL VENOUS BLD VENIPUNCTURE: CPT

## 2024-08-06 PROCEDURE — 2500000003 HC RX 250 WO HCPCS

## 2024-08-06 PROCEDURE — 2580000003 HC RX 258

## 2024-08-06 PROCEDURE — 85025 COMPLETE CBC W/AUTO DIFF WBC: CPT

## 2024-08-06 PROCEDURE — 6360000002 HC RX W HCPCS

## 2024-08-06 RX ORDER — IPRATROPIUM BROMIDE AND ALBUTEROL SULFATE 2.5; .5 MG/3ML; MG/3ML
1 SOLUTION RESPIRATORY (INHALATION)
Status: DISCONTINUED | OUTPATIENT
Start: 2024-08-06 | End: 2024-08-06 | Stop reason: HOSPADM

## 2024-08-06 RX ORDER — METHYLPREDNISOLONE 4 MG/1
TABLET ORAL
Qty: 1 KIT | Refills: 0 | Status: SHIPPED | OUTPATIENT
Start: 2024-08-06 | End: 2024-08-12

## 2024-08-06 RX ORDER — MAGNESIUM SULFATE HEPTAHYDRATE 40 MG/ML
2000 INJECTION, SOLUTION INTRAVENOUS ONCE
Status: COMPLETED | OUTPATIENT
Start: 2024-08-06 | End: 2024-08-06

## 2024-08-06 RX ADMIN — MAGNESIUM SULFATE HEPTAHYDRATE 2000 MG: 40 INJECTION, SOLUTION INTRAVENOUS at 16:12

## 2024-08-06 RX ADMIN — IPRATROPIUM BROMIDE AND ALBUTEROL SULFATE 1 DOSE: .5; 2.5 SOLUTION RESPIRATORY (INHALATION) at 16:10

## 2024-08-06 RX ADMIN — IPRATROPIUM BROMIDE AND ALBUTEROL SULFATE 1 DOSE: .5; 2.5 SOLUTION RESPIRATORY (INHALATION) at 16:20

## 2024-08-06 RX ADMIN — WATER 125 MG: 1 INJECTION INTRAMUSCULAR; INTRAVENOUS; SUBCUTANEOUS at 16:09

## 2024-08-06 ASSESSMENT — ENCOUNTER SYMPTOMS
COUGH: 1
ABDOMINAL PAIN: 0
BACK PAIN: 0
NAUSEA: 0
DIARRHEA: 0
VOMITING: 0
SHORTNESS OF BREATH: 1

## 2024-08-06 ASSESSMENT — LIFESTYLE VARIABLES
HOW MANY STANDARD DRINKS CONTAINING ALCOHOL DO YOU HAVE ON A TYPICAL DAY: PATIENT DOES NOT DRINK
HOW OFTEN DO YOU HAVE A DRINK CONTAINING ALCOHOL: NEVER

## 2024-08-06 NOTE — DISCHARGE INSTRUCTIONS
Take your medication as indicated and prescribed.  If you were given a prescription for prednisone or any other steroid then, take Pepcid (famotidine - over the counter) every day while you are taking the steroids.  If you are a diabetic, you should check your blood sugar more frequently while taking prednisone.  Use your inhaler or nebulizer as prescribed, or at minimum every 4 hours while you are having an asthma attack.    If you are given an antibiotic, then make sure you get the prescription filled and take the antibiotics until finished.  Drink plenty of water while taking the antibiotics.  Avoid drinking alcohol or drinks that have caffeine in it while taking antibiotics.       Being around people that smoke, miladys houses, weather change can cause an asthma exacerbation.  If you smoke, then you should discuss with your physician about ways to quit smoking.    PLEASE RETURN TO THE EMERGENCY DEPARTMENT IMMEDIATELY for worsening symptoms of shortness of breath, wheezing, change in the amount of sputum that you cough up or a change in the color of your sputum, using your inhaler more frequently or if your inhaler only lasts up to 2 hours, or if you develop any concerning symptoms such as: high fever not relieved by acetaminophen (Tylenol) and/or ibuprofen (Motrin / Advil), chills, shortness of breath, chest pain, feeling of your heart fluttering or racing, persistent nausea and/or vomiting, vomiting up blood, blood in your stool, loss of consciousness, numbness, weakness or tingling in the arms or legs or change in color of the extremities, changes in mental status, persistent headache, blurry vision, loss of bladder / bowel control, unable to follow up with your physician, or any other care or concern.

## 2024-08-06 NOTE — ED NOTES
Mode of arrival (squad #, walk in, police, etc) : Walk in        Chief complaint(s): Shortness of breath, chest pain         Arrival Note (brief scenario, treatment PTA, etc).: Pt arrived to ED complaining of shortness of breath and chest pain. The patient was seen in [facility] ED 2 days ago and symptoms have since worsened. The patient presents in a tripod position. The patient reports history of emphysema, COPD and 2 pneumothorax.

## 2024-08-06 NOTE — ED PROVIDER NOTES
EMERGENCY DEPARTMENT ENCOUNTER   ATTENDING ATTESTATION     Pt Name: Eugenie Estrada  MRN: [id number]  Birthdate 1970  Date of evaluation: 8/6/24       Eugenie Estrada is a 54 y.o. female who presents with Chest Pain and Shortness of Breath      MDM:   Coughing and wheezing.  Improved significantly with breathing treatment.  She like to go home.  She does not want be admitted.  Discussed with benefits alternative therapies.  Do not suspect PE.  Do not suspect pneumonia.  She is can follow-up with her lung physician and pulmonologist at [facility] for home nebulizer machine.  She currently has MDIs.  Will change her steroid dosing.  Do not suspect PE.  Do not suspect acute MI or ACS.    Vitals:   Vitals:    08/06/24 1629 08/06/24 1630 08/06/24 1632 08/06/24 1658   BP: 111/71 118/82 118/75 110/66   Pulse: 90 93 91 95   Resp: 26 26 25 19   Temp:       TempSrc:       SpO2: 97% 96% 96% 90%   Weight:       Height:             I personally saw and examined the patient. I have reviewed and agree with the resident's findings, including all diagnostic interpretations and treatment plan as written. I was present for the key portions of any procedures performed and the inclusive time noted for any critical care statement.    Chance Reddy MD  Attending Emergency Physician            Chance Reddy MD  08/06/24 7969    
seconds.      Findings: No rash.   Neurological:      General: No focal deficit present.      Mental Status: She is alert and oriented to person, place, and time.      Comments: Patient with adequate motor, and sensation to bilateral upper and lower distal extremities without acute deficits or deviation from patient's baseline.           DDX/DIAGNOSTIC RESULTS / EMERGENCY DEPARTMENT COURSE / MDM     Medical Decision Making  This is a 54-year-old female presenting with likely COPD exacerbation.  Will provide breathing treatment, Solu-Medrol, magnesium and obtain CXR and basic labs.  If showing signs of acute infection, will consider sepsis workup at that time.    Amount and/or Complexity of Data Reviewed  Independent Historian: friend  External Data Reviewed: notes.  Labs: ordered. Decision-making details documented in ED Course.  Radiology: ordered. Decision-making details documented in ED Course.  ECG/medicine tests: ordered. Decision-making details documented in ED Course.    Risk  Prescription drug management.  Decision regarding hospitalization.        EKG  EKG Interpretation    Interpreted by emergency department physician    Rhythm: normal sinus   Rate: normal  Axis: normal  Ectopy: none  Conduction: normal  ST Segments: no acute change  T Waves: no acute change  Q Waves: none    Clinical Impression: non-specific EKG    Timoteo Rosen MD      All EKG's are interpreted by the Emergency Department Physician who either signs or Co-signs this chart in the absence of a cardiologist.    EMERGENCY DEPARTMENT COURSE:  ED Course as of 08/06/24 1815   Tue Aug 06, 2024   1609 Respiratory therapy at bedside. [KM]   1615 Patient refused her COVID and flu swab due to \"never leaving her house\". [KM]   1635 CBC with Auto Differential(!):    WBC 10.8   RBC 3.87(!)   Hemoglobin Quant 11.4(!)   Hematocrit 34.7(!)   MCV 89.7   MCH 29.5   MCHC 32.9   RDW 15.2(!)   Platelet Count 274   MPV 6.7   Neutrophils % 76(!)

## 2024-08-07 LAB
EKG ATRIAL RATE: 93 BPM
EKG P AXIS: 72 DEGREES
EKG P-R INTERVAL: 154 MS
EKG Q-T INTERVAL: 346 MS
EKG QRS DURATION: 72 MS
EKG QTC CALCULATION (BAZETT): 430 MS
EKG R AXIS: 90 DEGREES
EKG T AXIS: 69 DEGREES
EKG VENTRICULAR RATE: 93 BPM

## 2024-08-07 PROCEDURE — 93010 ELECTROCARDIOGRAM REPORT: CPT | Performed by: INTERNAL MEDICINE

## 2024-08-07 NOTE — DISCHARGE INSTR - COC
Continuity of Care Form    Patient Name: Eugenie Estrada   :  1970  MRN:  [id number]    Admit date:  2024  Discharge date:  ***    Code Status Order: Prior   Advance Directives:     Admitting Physician:  No admitting provider for patient encounter.  PCP: Amy Joseph MD    Discharging Nurse: ***  Discharging Hospital Unit/Room#: RENETTA/RENETTA  Discharging Unit Phone Number: ***    Emergency Contact:   Extended Emergency Contact Information  Primary Emergency Contact: Maya Yo   [facility]  Home Phone: [phone]  Work Phone: [phone]  Mobile Phone: [phone]  Relation: Other  Secondary Emergency Contact: brigid estrada  Home Phone: [phone]  Relation: Child   needed? No    Past Surgical History:  Past Surgical History:   Procedure Laterality Date     SECTION      X 2    HYSTERECTOMY (CERVIX STATUS UNKNOWN)      Total    LUNG SURGERY  ,    Kamlesh chest tubes    OTHER SURGICAL HISTORY Bilateral     Talc pleurodesis by Dr. Carrion       Immunization History:   Immunization History   Administered Date(s) Administered    Influenza, AFLURIA (age 3 yrs+), FLUZONE, (age 6 mo+), MDV, 0.5mL 2017    Pneumococcal, PPSV23, PNEUMOVAX 23, (age 2y+), SC/IM, 0.5mL 2017       Active Problems:  Patient Active Problem List   Diagnosis Code    Moderate episode of recurrent major depressive disorder (HCC) F33.1    Panic disorder with agoraphobia and severe panic attacks F40.01    Borderline personality disorder (HCC) F60.3    Allergic rhinitis J30.9    History of pneumocystis pneumonia Z87.01    Chronic fatigue R53.82    Slow transit constipation K59.01    Hyperglycemia R73.9    Panlobular emphysema (HCC) J43.1    Chronic abdominal pain R10.9, G89.29    PTSD (post-traumatic stress disorder) F43.10    Severe episode of recurrent major depressive disorder, with psychotic features (HCC) F33.3    Hyperlipidemia with target LDL less than 100 E78.5    JULIA

## 2024-09-10 ENCOUNTER — APPOINTMENT (OUTPATIENT)
Dept: GENERAL RADIOLOGY | Age: 54
End: 2024-09-10
Payer: COMMERCIAL

## 2024-09-10 ENCOUNTER — HOSPITAL ENCOUNTER (INPATIENT)
Age: 54
LOS: 2 days | Discharge: HOME | End: 2024-09-12
Attending: EMERGENCY MEDICINE | Admitting: INTERNAL MEDICINE
Payer: COMMERCIAL

## 2024-09-10 DIAGNOSIS — J44.1 COPD EXACERBATION (HCC): Primary | ICD-10-CM

## 2024-09-10 DIAGNOSIS — R06.00 DYSPNEA, UNSPECIFIED TYPE: ICD-10-CM

## 2024-09-10 PROBLEM — R06.02 SOB (SHORTNESS OF BREATH): Status: ACTIVE | Noted: 2024-09-10

## 2024-09-10 LAB
ANION GAP SERPL CALCULATED.3IONS-SCNC: 14 MMOL/L (ref 9–17)
BASOPHILS # BLD: 0 K/UL (ref 0–0.2)
BASOPHILS NFR BLD: 1 % (ref 0–2)
BNP SERPL-MCNC: <36 PG/ML
BUN SERPL-MCNC: 14 MG/DL (ref 6–20)
CALCIUM SERPL-MCNC: 9.1 MG/DL (ref 8.6–10.4)
CHLORIDE SERPL-SCNC: 102 MMOL/L (ref 98–107)
CO2 SERPL-SCNC: 20 MMOL/L (ref 20–31)
CREAT SERPL-MCNC: 0.9 MG/DL (ref 0.5–0.9)
EOSINOPHIL # BLD: 0.3 K/UL (ref 0–0.4)
EOSINOPHILS RELATIVE PERCENT: 5 % (ref 0–4)
ERYTHROCYTE [DISTWIDTH] IN BLOOD BY AUTOMATED COUNT: 15.3 % (ref 11.5–14.9)
GFR, ESTIMATED: 77 ML/MIN/1.73M2
GLUCOSE SERPL-MCNC: 89 MG/DL (ref 70–99)
HCT VFR BLD AUTO: 37.3 % (ref 36–46)
HGB BLD-MCNC: 12.7 G/DL (ref 12–16)
INR PPP: 0.9
LYMPHOCYTES NFR BLD: 1.5 K/UL (ref 1–4.8)
LYMPHOCYTES RELATIVE PERCENT: 22 % (ref 24–44)
MAGNESIUM SERPL-MCNC: 2.2 MG/DL (ref 1.6–2.6)
MCH RBC QN AUTO: 30.2 PG (ref 26–34)
MCHC RBC AUTO-ENTMCNC: 34 G/DL (ref 31–37)
MCV RBC AUTO: 88.9 FL (ref 80–100)
MONOCYTES NFR BLD: 0.4 K/UL (ref 0.1–1.3)
MONOCYTES NFR BLD: 6 % (ref 1–7)
NEUTROPHILS NFR BLD: 66 % (ref 36–66)
NEUTS SEG NFR BLD: 4.5 K/UL (ref 1.3–9.1)
PARTIAL THROMBOPLASTIN TIME: 28.5 SEC (ref 24–36)
PHOSPHATE SERPL-MCNC: 2.8 MG/DL (ref 2.6–4.5)
PLATELET # BLD AUTO: 254 K/UL (ref 150–450)
PMV BLD AUTO: 6.6 FL (ref 6–12)
POTASSIUM SERPL-SCNC: 4.1 MMOL/L (ref 3.7–5.3)
PROTHROMBIN TIME: 12.6 SEC (ref 11.8–14.6)
RBC # BLD AUTO: 4.2 M/UL (ref 4–5.2)
SODIUM SERPL-SCNC: 136 MMOL/L (ref 135–144)
TROPONIN I SERPL HS-MCNC: 6 NG/L (ref 0–14)
TROPONIN I SERPL HS-MCNC: 6 NG/L (ref 0–14)
WBC OTHER # BLD: 6.7 K/UL (ref 3.5–11)

## 2024-09-10 PROCEDURE — 94640 AIRWAY INHALATION TREATMENT: CPT

## 2024-09-10 PROCEDURE — 71045 X-RAY EXAM CHEST 1 VIEW: CPT

## 2024-09-10 PROCEDURE — 94761 N-INVAS EAR/PLS OXIMETRY MLT: CPT

## 2024-09-10 PROCEDURE — 83735 ASSAY OF MAGNESIUM: CPT

## 2024-09-10 PROCEDURE — 6360000002 HC RX W HCPCS: Performed by: EMERGENCY MEDICINE

## 2024-09-10 PROCEDURE — 84484 ASSAY OF TROPONIN QUANT: CPT

## 2024-09-10 PROCEDURE — 83880 ASSAY OF NATRIURETIC PEPTIDE: CPT

## 2024-09-10 PROCEDURE — 80048 BASIC METABOLIC PNL TOTAL CA: CPT

## 2024-09-10 PROCEDURE — 93005 ELECTROCARDIOGRAM TRACING: CPT | Performed by: EMERGENCY MEDICINE

## 2024-09-10 PROCEDURE — 36415 COLL VENOUS BLD VENIPUNCTURE: CPT

## 2024-09-10 PROCEDURE — 84100 ASSAY OF PHOSPHORUS: CPT

## 2024-09-10 PROCEDURE — 85025 COMPLETE CBC W/AUTO DIFF WBC: CPT

## 2024-09-10 PROCEDURE — 6370000000 HC RX 637 (ALT 250 FOR IP)

## 2024-09-10 PROCEDURE — 1200000000 HC SEMI PRIVATE

## 2024-09-10 PROCEDURE — 85730 THROMBOPLASTIN TIME PARTIAL: CPT

## 2024-09-10 PROCEDURE — 6360000002 HC RX W HCPCS

## 2024-09-10 PROCEDURE — 2700000000 HC OXYGEN THERAPY PER DAY

## 2024-09-10 PROCEDURE — 96374 THER/PROPH/DIAG INJ IV PUSH: CPT

## 2024-09-10 PROCEDURE — 2580000003 HC RX 258

## 2024-09-10 PROCEDURE — 85610 PROTHROMBIN TIME: CPT

## 2024-09-10 PROCEDURE — 2580000003 HC RX 258: Performed by: EMERGENCY MEDICINE

## 2024-09-10 PROCEDURE — 99285 EMERGENCY DEPT VISIT HI MDM: CPT

## 2024-09-10 PROCEDURE — 6370000000 HC RX 637 (ALT 250 FOR IP): Performed by: EMERGENCY MEDICINE

## 2024-09-10 PROCEDURE — 94664 DEMO&/EVAL PT USE INHALER: CPT

## 2024-09-10 RX ORDER — POLYETHYLENE GLYCOL 3350 17 G/17G
17 POWDER, FOR SOLUTION ORAL DAILY PRN
Status: DISCONTINUED | OUTPATIENT
Start: 2024-09-10 | End: 2024-09-12 | Stop reason: HOSPADM

## 2024-09-10 RX ORDER — CLONAZEPAM 0.5 MG/1
0.25 TABLET ORAL 3 TIMES DAILY
Status: DISCONTINUED | OUTPATIENT
Start: 2024-09-10 | End: 2024-09-12 | Stop reason: HOSPADM

## 2024-09-10 RX ORDER — ACETAMINOPHEN 650 MG/1
650 SUPPOSITORY RECTAL EVERY 6 HOURS PRN
Status: DISCONTINUED | OUTPATIENT
Start: 2024-09-10 | End: 2024-09-12 | Stop reason: HOSPADM

## 2024-09-10 RX ORDER — QUETIAPINE FUMARATE 200 MG/1
400 TABLET, FILM COATED ORAL NIGHTLY
Status: DISCONTINUED | OUTPATIENT
Start: 2024-09-10 | End: 2024-09-12 | Stop reason: HOSPADM

## 2024-09-10 RX ORDER — TRAZODONE HYDROCHLORIDE 50 MG/1
50 TABLET, FILM COATED ORAL NIGHTLY
Status: DISCONTINUED | OUTPATIENT
Start: 2024-09-10 | End: 2024-09-12 | Stop reason: HOSPADM

## 2024-09-10 RX ORDER — SODIUM CHLORIDE 9 MG/ML
INJECTION, SOLUTION INTRAVENOUS CONTINUOUS
Status: DISCONTINUED | OUTPATIENT
Start: 2024-09-10 | End: 2024-09-11

## 2024-09-10 RX ORDER — ESCITALOPRAM OXALATE 20 MG/1
20 TABLET ORAL NIGHTLY
Status: DISCONTINUED | OUTPATIENT
Start: 2024-09-10 | End: 2024-09-12 | Stop reason: HOSPADM

## 2024-09-10 RX ORDER — PRAVASTATIN SODIUM 40 MG
40 TABLET ORAL EVERY EVENING
Status: DISCONTINUED | OUTPATIENT
Start: 2024-09-10 | End: 2024-09-11

## 2024-09-10 RX ORDER — IPRATROPIUM BROMIDE AND ALBUTEROL SULFATE 2.5; .5 MG/3ML; MG/3ML
1 SOLUTION RESPIRATORY (INHALATION)
Status: DISCONTINUED | OUTPATIENT
Start: 2024-09-11 | End: 2024-09-12 | Stop reason: HOSPADM

## 2024-09-10 RX ORDER — NICOTINE 21 MG/24HR
1 PATCH, TRANSDERMAL 24 HOURS TRANSDERMAL DAILY
Status: DISCONTINUED | OUTPATIENT
Start: 2024-09-11 | End: 2024-09-12 | Stop reason: HOSPADM

## 2024-09-10 RX ORDER — SODIUM CHLORIDE 0.9 % (FLUSH) 0.9 %
5-40 SYRINGE (ML) INJECTION PRN
Status: DISCONTINUED | OUTPATIENT
Start: 2024-09-10 | End: 2024-09-12 | Stop reason: HOSPADM

## 2024-09-10 RX ORDER — SODIUM CHLORIDE 9 MG/ML
INJECTION, SOLUTION INTRAVENOUS PRN
Status: DISCONTINUED | OUTPATIENT
Start: 2024-09-10 | End: 2024-09-12 | Stop reason: HOSPADM

## 2024-09-10 RX ORDER — ONDANSETRON 2 MG/ML
4 INJECTION INTRAMUSCULAR; INTRAVENOUS EVERY 6 HOURS PRN
Status: DISCONTINUED | OUTPATIENT
Start: 2024-09-10 | End: 2024-09-12 | Stop reason: HOSPADM

## 2024-09-10 RX ORDER — IPRATROPIUM BROMIDE AND ALBUTEROL SULFATE 2.5; .5 MG/3ML; MG/3ML
1 SOLUTION RESPIRATORY (INHALATION)
Status: DISCONTINUED | OUTPATIENT
Start: 2024-09-10 | End: 2024-09-10 | Stop reason: SDUPTHER

## 2024-09-10 RX ORDER — ESCITALOPRAM OXALATE 20 MG/1
20 TABLET ORAL DAILY
Status: DISCONTINUED | OUTPATIENT
Start: 2024-09-11 | End: 2024-09-10

## 2024-09-10 RX ORDER — QUETIAPINE FUMARATE 200 MG/1
400 TABLET, FILM COATED ORAL NIGHTLY
Status: CANCELLED | OUTPATIENT
Start: 2024-09-10

## 2024-09-10 RX ORDER — QUETIAPINE FUMARATE 200 MG/1
200 TABLET, FILM COATED ORAL 2 TIMES DAILY
Status: CANCELLED | OUTPATIENT
Start: 2024-09-11

## 2024-09-10 RX ORDER — ALBUTEROL SULFATE 90 UG/1
2 INHALANT RESPIRATORY (INHALATION) EVERY 6 HOURS PRN
Status: CANCELLED | OUTPATIENT
Start: 2024-09-10

## 2024-09-10 RX ORDER — ONDANSETRON 4 MG/1
4 TABLET, ORALLY DISINTEGRATING ORAL EVERY 8 HOURS PRN
Status: DISCONTINUED | OUTPATIENT
Start: 2024-09-10 | End: 2024-09-12 | Stop reason: HOSPADM

## 2024-09-10 RX ORDER — SODIUM CHLORIDE 0.9 % (FLUSH) 0.9 %
5-40 SYRINGE (ML) INJECTION EVERY 12 HOURS SCHEDULED
Status: DISCONTINUED | OUTPATIENT
Start: 2024-09-10 | End: 2024-09-12 | Stop reason: HOSPADM

## 2024-09-10 RX ORDER — DIPHENHYDRAMINE HCL 25 MG
25 TABLET ORAL EVERY 6 HOURS PRN
COMMUNITY

## 2024-09-10 RX ORDER — PRAVASTATIN SODIUM 40 MG
20 TABLET ORAL EVERY EVENING
Status: CANCELLED | OUTPATIENT
Start: 2024-09-10

## 2024-09-10 RX ORDER — ENOXAPARIN SODIUM 100 MG/ML
40 INJECTION SUBCUTANEOUS DAILY
Status: DISCONTINUED | OUTPATIENT
Start: 2024-09-11 | End: 2024-09-12 | Stop reason: HOSPADM

## 2024-09-10 RX ORDER — ACETAMINOPHEN 325 MG/1
650 TABLET ORAL EVERY 6 HOURS PRN
Status: DISCONTINUED | OUTPATIENT
Start: 2024-09-10 | End: 2024-09-12 | Stop reason: HOSPADM

## 2024-09-10 RX ORDER — PREDNISONE 20 MG/1
40 TABLET ORAL DAILY
Status: DISCONTINUED | OUTPATIENT
Start: 2024-09-13 | End: 2024-09-11

## 2024-09-10 RX ORDER — GUAIFENESIN 600 MG/1
600 TABLET, EXTENDED RELEASE ORAL 2 TIMES DAILY
Status: DISCONTINUED | OUTPATIENT
Start: 2024-09-10 | End: 2024-09-12 | Stop reason: HOSPADM

## 2024-09-10 RX ADMIN — SODIUM CHLORIDE: 9 INJECTION, SOLUTION INTRAVENOUS at 22:46

## 2024-09-10 RX ADMIN — QUETIAPINE FUMARATE 400 MG: 200 TABLET ORAL at 22:47

## 2024-09-10 RX ADMIN — WATER 60 MG: 1 INJECTION INTRAMUSCULAR; INTRAVENOUS; SUBCUTANEOUS at 22:47

## 2024-09-10 RX ADMIN — ESCITALOPRAM OXALATE 20 MG: 20 TABLET ORAL at 22:47

## 2024-09-10 RX ADMIN — SODIUM CHLORIDE, PRESERVATIVE FREE 10 ML: 5 INJECTION INTRAVENOUS at 22:45

## 2024-09-10 RX ADMIN — GUAIFENESIN 600 MG: 600 TABLET, EXTENDED RELEASE ORAL at 22:47

## 2024-09-10 RX ADMIN — CLONAZEPAM 0.25 MG: 0.5 TABLET ORAL at 22:47

## 2024-09-10 RX ADMIN — WATER 125 MG: 1 INJECTION INTRAMUSCULAR; INTRAVENOUS; SUBCUTANEOUS at 18:28

## 2024-09-10 RX ADMIN — TRAZODONE HYDROCHLORIDE 50 MG: 50 TABLET ORAL at 22:47

## 2024-09-10 RX ADMIN — IPRATROPIUM BROMIDE AND ALBUTEROL SULFATE 1 DOSE: 2.5; .5 SOLUTION RESPIRATORY (INHALATION) at 18:30

## 2024-09-10 ASSESSMENT — ENCOUNTER SYMPTOMS
ABDOMINAL PAIN: 0
EYE PAIN: 0
NAUSEA: 0
TROUBLE SWALLOWING: 0
COLOR CHANGE: 0
WHEEZING: 1
FACIAL SWELLING: 0
VOMITING: 0
SHORTNESS OF BREATH: 1
PHOTOPHOBIA: 0
CHEST TIGHTNESS: 0
BACK PAIN: 0
VOICE CHANGE: 0

## 2024-09-10 ASSESSMENT — LIFESTYLE VARIABLES
HOW OFTEN DO YOU HAVE A DRINK CONTAINING ALCOHOL: NEVER
HOW MANY STANDARD DRINKS CONTAINING ALCOHOL DO YOU HAVE ON A TYPICAL DAY: PATIENT DOES NOT DRINK

## 2024-09-10 ASSESSMENT — PAIN SCALES - GENERAL
PAINLEVEL_OUTOF10: 5
PAINLEVEL_OUTOF10: 0

## 2024-09-10 ASSESSMENT — PAIN - FUNCTIONAL ASSESSMENT: PAIN_FUNCTIONAL_ASSESSMENT: 0-10

## 2024-09-10 ASSESSMENT — HEART SCORE: ECG: NORMAL

## 2024-09-11 ENCOUNTER — APPOINTMENT (OUTPATIENT)
Dept: GENERAL RADIOLOGY | Age: 54
End: 2024-09-11
Payer: COMMERCIAL

## 2024-09-11 LAB
B PARAP IS1001 DNA NPH QL NAA+NON-PROBE: NOT DETECTED
B PERT DNA SPEC QL NAA+PROBE: NOT DETECTED
C PNEUM DNA NPH QL NAA+NON-PROBE: NOT DETECTED
D DIMER PPP FEU-MCNC: 0.29 UG/ML FEU (ref 0–0.59)
EKG ATRIAL RATE: 106 BPM
EKG P AXIS: 70 DEGREES
EKG P-R INTERVAL: 168 MS
EKG Q-T INTERVAL: 354 MS
EKG QRS DURATION: 72 MS
EKG QTC CALCULATION (BAZETT): 470 MS
EKG R AXIS: 61 DEGREES
EKG T AXIS: 75 DEGREES
EKG VENTRICULAR RATE: 106 BPM
FLUAV RNA NPH QL NAA+NON-PROBE: NOT DETECTED
FLUBV RNA NPH QL NAA+NON-PROBE: NOT DETECTED
HADV DNA NPH QL NAA+NON-PROBE: NOT DETECTED
HCOV 229E RNA NPH QL NAA+NON-PROBE: NOT DETECTED
HCOV HKU1 RNA NPH QL NAA+NON-PROBE: NOT DETECTED
HCOV NL63 RNA NPH QL NAA+NON-PROBE: NOT DETECTED
HCOV OC43 RNA NPH QL NAA+NON-PROBE: NOT DETECTED
HMPV RNA NPH QL NAA+NON-PROBE: NOT DETECTED
HPIV1 RNA NPH QL NAA+NON-PROBE: NOT DETECTED
HPIV2 RNA NPH QL NAA+NON-PROBE: NOT DETECTED
HPIV3 RNA NPH QL NAA+NON-PROBE: NOT DETECTED
HPIV4 RNA NPH QL NAA+NON-PROBE: NOT DETECTED
M PNEUMO DNA NPH QL NAA+NON-PROBE: NOT DETECTED
RSV RNA NPH QL NAA+NON-PROBE: NOT DETECTED
RV+EV RNA NPH QL NAA+NON-PROBE: NOT DETECTED
SARS-COV-2 RNA NPH QL NAA+NON-PROBE: NOT DETECTED
SPECIMEN DESCRIPTION: NORMAL

## 2024-09-11 PROCEDURE — 2580000003 HC RX 258: Performed by: NURSE PRACTITIONER

## 2024-09-11 PROCEDURE — 71045 X-RAY EXAM CHEST 1 VIEW: CPT

## 2024-09-11 PROCEDURE — 99223 1ST HOSP IP/OBS HIGH 75: CPT | Performed by: INTERNAL MEDICINE

## 2024-09-11 PROCEDURE — 2700000000 HC OXYGEN THERAPY PER DAY

## 2024-09-11 PROCEDURE — 93010 ELECTROCARDIOGRAM REPORT: CPT | Performed by: INTERNAL MEDICINE

## 2024-09-11 PROCEDURE — 1200000000 HC SEMI PRIVATE

## 2024-09-11 PROCEDURE — 97162 PT EVAL MOD COMPLEX 30 MIN: CPT

## 2024-09-11 PROCEDURE — 97165 OT EVAL LOW COMPLEX 30 MIN: CPT

## 2024-09-11 PROCEDURE — 94761 N-INVAS EAR/PLS OXIMETRY MLT: CPT

## 2024-09-11 PROCEDURE — 6370000000 HC RX 637 (ALT 250 FOR IP)

## 2024-09-11 PROCEDURE — 0202U NFCT DS 22 TRGT SARS-COV-2: CPT

## 2024-09-11 PROCEDURE — 36415 COLL VENOUS BLD VENIPUNCTURE: CPT

## 2024-09-11 PROCEDURE — 6370000000 HC RX 637 (ALT 250 FOR IP): Performed by: NURSE PRACTITIONER

## 2024-09-11 PROCEDURE — 85379 FIBRIN DEGRADATION QUANT: CPT

## 2024-09-11 PROCEDURE — 94640 AIRWAY INHALATION TREATMENT: CPT

## 2024-09-11 PROCEDURE — 6360000002 HC RX W HCPCS

## 2024-09-11 PROCEDURE — 2580000003 HC RX 258

## 2024-09-11 PROCEDURE — 94762 N-INVAS EAR/PLS OXIMTRY CONT: CPT

## 2024-09-11 PROCEDURE — 6360000002 HC RX W HCPCS: Performed by: NURSE PRACTITIONER

## 2024-09-11 RX ORDER — BUDESONIDE AND FORMOTEROL FUMARATE DIHYDRATE 160; 4.5 UG/1; UG/1
2 AEROSOL RESPIRATORY (INHALATION)
Status: DISCONTINUED | OUTPATIENT
Start: 2024-09-11 | End: 2024-09-12 | Stop reason: HOSPADM

## 2024-09-11 RX ADMIN — IPRATROPIUM BROMIDE AND ALBUTEROL SULFATE 1 DOSE: 2.5; .5 SOLUTION RESPIRATORY (INHALATION) at 14:52

## 2024-09-11 RX ADMIN — BUDESONIDE AND FORMOTEROL FUMARATE DIHYDRATE 2 PUFF: 160; 4.5 AEROSOL RESPIRATORY (INHALATION) at 19:43

## 2024-09-11 RX ADMIN — WATER 60 MG: 1 INJECTION INTRAMUSCULAR; INTRAVENOUS; SUBCUTANEOUS at 09:07

## 2024-09-11 RX ADMIN — IPRATROPIUM BROMIDE AND ALBUTEROL SULFATE 1 DOSE: 2.5; .5 SOLUTION RESPIRATORY (INHALATION) at 07:19

## 2024-09-11 RX ADMIN — SODIUM CHLORIDE, PRESERVATIVE FREE 10 ML: 5 INJECTION INTRAVENOUS at 20:50

## 2024-09-11 RX ADMIN — CLONAZEPAM 0.25 MG: 0.5 TABLET ORAL at 09:06

## 2024-09-11 RX ADMIN — WATER 60 MG: 1 INJECTION INTRAMUSCULAR; INTRAVENOUS; SUBCUTANEOUS at 04:57

## 2024-09-11 RX ADMIN — TRAZODONE HYDROCHLORIDE 50 MG: 50 TABLET ORAL at 20:50

## 2024-09-11 RX ADMIN — QUETIAPINE FUMARATE 400 MG: 200 TABLET ORAL at 20:50

## 2024-09-11 RX ADMIN — ACETAMINOPHEN 650 MG: 325 TABLET ORAL at 20:49

## 2024-09-11 RX ADMIN — IPRATROPIUM BROMIDE AND ALBUTEROL SULFATE 1 DOSE: 2.5; .5 SOLUTION RESPIRATORY (INHALATION) at 19:33

## 2024-09-11 RX ADMIN — GUAIFENESIN 600 MG: 600 TABLET, EXTENDED RELEASE ORAL at 09:06

## 2024-09-11 RX ADMIN — ESCITALOPRAM OXALATE 20 MG: 20 TABLET ORAL at 20:50

## 2024-09-11 RX ADMIN — WATER 40 MG: 1 INJECTION INTRAMUSCULAR; INTRAVENOUS; SUBCUTANEOUS at 20:50

## 2024-09-11 RX ADMIN — CLONAZEPAM 0.25 MG: 0.5 TABLET ORAL at 20:50

## 2024-09-11 RX ADMIN — GUAIFENESIN 600 MG: 600 TABLET, EXTENDED RELEASE ORAL at 20:50

## 2024-09-11 RX ADMIN — CLONAZEPAM 0.25 MG: 0.5 TABLET ORAL at 16:05

## 2024-09-11 RX ADMIN — IPRATROPIUM BROMIDE AND ALBUTEROL SULFATE 1 DOSE: 2.5; .5 SOLUTION RESPIRATORY (INHALATION) at 11:51

## 2024-09-11 ASSESSMENT — PAIN SCALES - GENERAL
PAINLEVEL_OUTOF10: 4
PAINLEVEL_OUTOF10: 0

## 2024-09-12 VITALS
HEART RATE: 104 BPM | TEMPERATURE: 97.9 F | BODY MASS INDEX: 20.09 KG/M2 | DIASTOLIC BLOOD PRESSURE: 77 MMHG | SYSTOLIC BLOOD PRESSURE: 109 MMHG | RESPIRATION RATE: 18 BRPM | WEIGHT: 125 LBS | HEIGHT: 66 IN | OXYGEN SATURATION: 95 %

## 2024-09-12 PROCEDURE — 6370000000 HC RX 637 (ALT 250 FOR IP): Performed by: NURSE PRACTITIONER

## 2024-09-12 PROCEDURE — 6370000000 HC RX 637 (ALT 250 FOR IP)

## 2024-09-12 PROCEDURE — 2580000003 HC RX 258: Performed by: NURSE PRACTITIONER

## 2024-09-12 PROCEDURE — 99239 HOSP IP/OBS DSCHRG MGMT >30: CPT | Performed by: INTERNAL MEDICINE

## 2024-09-12 PROCEDURE — 94640 AIRWAY INHALATION TREATMENT: CPT

## 2024-09-12 PROCEDURE — 6360000002 HC RX W HCPCS: Performed by: NURSE PRACTITIONER

## 2024-09-12 PROCEDURE — 94761 N-INVAS EAR/PLS OXIMETRY MLT: CPT

## 2024-09-12 RX ORDER — PREDNISONE 10 MG/1
TABLET ORAL
Qty: 18 TABLET | Refills: 0 | Status: SHIPPED | OUTPATIENT
Start: 2024-09-12 | End: 2024-09-22

## 2024-09-12 RX ORDER — BUDESONIDE AND FORMOTEROL FUMARATE DIHYDRATE 160; 4.5 UG/1; UG/1
2 AEROSOL RESPIRATORY (INHALATION)
Qty: 10.2 G | Refills: 3 | Status: SHIPPED | OUTPATIENT
Start: 2024-09-12 | End: 2024-09-12 | Stop reason: HOSPADM

## 2024-09-12 RX ORDER — GUAIFENESIN 600 MG/1
600 TABLET, EXTENDED RELEASE ORAL 2 TIMES DAILY
Qty: 60 TABLET | Refills: 0 | Status: SHIPPED | OUTPATIENT
Start: 2024-09-12

## 2024-09-12 RX ORDER — IPRATROPIUM BROMIDE AND ALBUTEROL SULFATE 2.5; .5 MG/3ML; MG/3ML
3 SOLUTION RESPIRATORY (INHALATION)
Qty: 360 ML | Refills: 3 | Status: SHIPPED | OUTPATIENT
Start: 2024-09-12

## 2024-09-12 RX ORDER — FLUTICASONE PROPIONATE AND SALMETEROL 250; 50 UG/1; UG/1
1 POWDER RESPIRATORY (INHALATION) EVERY 12 HOURS
Qty: 60 EACH | Refills: 3 | Status: SHIPPED | OUTPATIENT
Start: 2024-09-12

## 2024-09-12 RX ORDER — NICOTINE 21 MG/24HR
1 PATCH, TRANSDERMAL 24 HOURS TRANSDERMAL DAILY
Qty: 30 PATCH | Refills: 3 | Status: SHIPPED | OUTPATIENT
Start: 2024-09-13

## 2024-09-12 RX ORDER — LEVOFLOXACIN 500 MG/1
500 TABLET, FILM COATED ORAL DAILY
Qty: 5 TABLET | Refills: 0 | Status: SHIPPED | OUTPATIENT
Start: 2024-09-12 | End: 2024-09-17

## 2024-09-12 RX ADMIN — GUAIFENESIN 600 MG: 600 TABLET, EXTENDED RELEASE ORAL at 10:22

## 2024-09-12 RX ADMIN — IPRATROPIUM BROMIDE AND ALBUTEROL SULFATE 1 DOSE: 2.5; .5 SOLUTION RESPIRATORY (INHALATION) at 11:04

## 2024-09-12 RX ADMIN — WATER 40 MG: 1 INJECTION INTRAMUSCULAR; INTRAVENOUS; SUBCUTANEOUS at 05:32

## 2024-09-12 RX ADMIN — CLONAZEPAM 0.25 MG: 0.5 TABLET ORAL at 10:22

## 2024-09-12 RX ADMIN — BUDESONIDE AND FORMOTEROL FUMARATE DIHYDRATE 2 PUFF: 160; 4.5 AEROSOL RESPIRATORY (INHALATION) at 07:31

## 2024-09-12 RX ADMIN — IPRATROPIUM BROMIDE AND ALBUTEROL SULFATE 1 DOSE: 2.5; .5 SOLUTION RESPIRATORY (INHALATION) at 07:20

## 2024-10-02 NOTE — TELEPHONE ENCOUNTER
PROVIDER FEEDBACK LOOP CALLED 3X MAMMO     Patient:Eugenie Estrada  : 1970  Referring Provider: LAYO CASE  Referral Type:  Other     Procedures:  [id number] - TD ZANDER DIGITAL SCREEN BILATERAL  Date Service Ordered 3/13/2024        We were unable to reach Eugenie Estrada to schedule the screening mammogram ordered by your office after 3 outreach attempts via either text, email and/or phone call.  Please call/follow up with your patient to explain the significance of the ordered test and direct the patient to self-schedule via [employer].     Please complete one of the following actions from Quick Actions buttons:     Route to Provider:  Route message to ordering provider to seek next steps in care plan.     Telephone Encounter:  Telephone encounter will open.  Call patient to explain significance of ordered test and direct patient to call Central Scheduling to schedule test then Document details of call.     Open Referral:  Review referral notes or details if needed.     Close Referral:  Referral will open.  Document in Notes section of referral why the referral is being closed.  Examples of referral closure:  Patient had test done outside of of an office in the [facility], Patient refuses test, Patient no longer having symptoms, Unable to reach patient.  Only close the referral if you are sure the test will not proceed.     Thank you     Pre-Access Scheduling Team         Summary    Auto: [id number] PROVIDER FEEDBACK LOOP CALLED 3X MAMMO      oriented to person, place, time and situation

## 2024-10-06 DIAGNOSIS — J43.1 PANLOBULAR EMPHYSEMA (HCC): ICD-10-CM

## 2024-10-07 RX ORDER — ALBUTEROL SULFATE 90 UG/1
INHALANT RESPIRATORY (INHALATION)
Qty: 8.5 G | Refills: 0 | Status: SHIPPED | OUTPATIENT
Start: 2024-10-07

## 2024-10-07 NOTE — TELEPHONE ENCOUNTER
Patient missed the last appointment, to make an appointment in 1 or 2 months and to keep it  No future appointments.

## 2024-11-27 ENCOUNTER — HOSPITAL ENCOUNTER (EMERGENCY)
Age: 54
Discharge: HOME | DRG: 190 | End: 2024-11-27
Attending: EMERGENCY MEDICINE
Payer: COMMERCIAL

## 2024-11-27 ENCOUNTER — APPOINTMENT (OUTPATIENT)
Dept: GENERAL RADIOLOGY | Age: 54
DRG: 190 | End: 2024-11-27
Payer: COMMERCIAL

## 2024-11-27 VITALS
OXYGEN SATURATION: 91 % | BODY MASS INDEX: 20.4 KG/M2 | HEART RATE: 114 BPM | SYSTOLIC BLOOD PRESSURE: 120 MMHG | TEMPERATURE: 98.9 F | HEIGHT: 67 IN | DIASTOLIC BLOOD PRESSURE: 79 MMHG | RESPIRATION RATE: 23 BRPM | WEIGHT: 130 LBS

## 2024-11-27 DIAGNOSIS — J44.1 COPD EXACERBATION (HCC): Primary | ICD-10-CM

## 2024-11-27 LAB
ANION GAP SERPL CALCULATED.3IONS-SCNC: 14 MMOL/L (ref 9–16)
BASOPHILS # BLD: 0 K/UL (ref 0–0.2)
BASOPHILS NFR BLD: 0 % (ref 0–2)
BUN SERPL-MCNC: 10 MG/DL (ref 6–20)
CALCIUM SERPL-MCNC: 9.1 MG/DL (ref 8.6–10.4)
CHLORIDE SERPL-SCNC: 104 MMOL/L (ref 98–107)
CO2 SERPL-SCNC: 19 MMOL/L (ref 20–31)
COHGB MFR BLD: 5.6 % (ref 0–5)
CREAT SERPL-MCNC: 0.8 MG/DL (ref 0.7–1.2)
EOSINOPHIL # BLD: 0.1 K/UL (ref 0–0.4)
EOSINOPHILS RELATIVE PERCENT: 2 % (ref 0–4)
ERYTHROCYTE [DISTWIDTH] IN BLOOD BY AUTOMATED COUNT: 15 % (ref 11.5–14.9)
GFR, ESTIMATED: 89 ML/MIN/1.73M2
GLUCOSE SERPL-MCNC: 111 MG/DL (ref 74–99)
HCO3 VENOUS: 19.4 MMOL/L (ref 24–30)
HCT VFR BLD AUTO: 35 % (ref 36–46)
HGB BLD-MCNC: 11.9 G/DL (ref 12–16)
LYMPHOCYTES NFR BLD: 0.7 K/UL (ref 1–4.8)
LYMPHOCYTES RELATIVE PERCENT: 11 % (ref 24–44)
MCH RBC QN AUTO: 30.2 PG (ref 26–34)
MCHC RBC AUTO-ENTMCNC: 33.9 G/DL (ref 31–37)
MCV RBC AUTO: 89 FL (ref 80–100)
METHEMOGLOBIN: 0.3 % (ref 0–1.9)
MONOCYTES NFR BLD: 0.4 K/UL (ref 0.1–1.3)
MONOCYTES NFR BLD: 6 % (ref 1–7)
NEGATIVE BASE EXCESS, VEN: 3.7 MMOL/L (ref 0–2)
NEUTROPHILS NFR BLD: 81 % (ref 36–66)
NEUTS SEG NFR BLD: 5 K/UL (ref 1.3–9.1)
O2 SAT, VEN: 96.2 % (ref 60–85)
PCO2 VENOUS: 29.6 MM HG (ref 39–55)
PH VENOUS: 7.43 (ref 7.32–7.42)
PLATELET # BLD AUTO: 195 K/UL (ref 150–450)
PMV BLD AUTO: 7.1 FL (ref 6–12)
PO2 VENOUS: 82.6 MM HG (ref 30–50)
POTASSIUM SERPL-SCNC: 4.1 MMOL/L (ref 3.7–5.3)
RBC # BLD AUTO: 3.94 M/UL (ref 4–5.2)
SODIUM SERPL-SCNC: 137 MMOL/L (ref 136–145)
WBC OTHER # BLD: 6.2 K/UL (ref 3.5–11)

## 2024-11-27 PROCEDURE — 71045 X-RAY EXAM CHEST 1 VIEW: CPT

## 2024-11-27 PROCEDURE — 6360000002 HC RX W HCPCS: Performed by: EMERGENCY MEDICINE

## 2024-11-27 PROCEDURE — 85025 COMPLETE CBC W/AUTO DIFF WBC: CPT

## 2024-11-27 PROCEDURE — 94761 N-INVAS EAR/PLS OXIMETRY MLT: CPT

## 2024-11-27 PROCEDURE — 36415 COLL VENOUS BLD VENIPUNCTURE: CPT

## 2024-11-27 PROCEDURE — 99284 EMERGENCY DEPT VISIT MOD MDM: CPT

## 2024-11-27 PROCEDURE — 82800 BLOOD PH: CPT

## 2024-11-27 PROCEDURE — 82805 BLOOD GASES W/O2 SATURATION: CPT

## 2024-11-27 PROCEDURE — 96374 THER/PROPH/DIAG INJ IV PUSH: CPT

## 2024-11-27 PROCEDURE — 6370000000 HC RX 637 (ALT 250 FOR IP): Performed by: EMERGENCY MEDICINE

## 2024-11-27 PROCEDURE — 94640 AIRWAY INHALATION TREATMENT: CPT

## 2024-11-27 PROCEDURE — 80048 BASIC METABOLIC PNL TOTAL CA: CPT

## 2024-11-27 PROCEDURE — 2580000003 HC RX 258: Performed by: EMERGENCY MEDICINE

## 2024-11-27 RX ORDER — IPRATROPIUM BROMIDE AND ALBUTEROL SULFATE 2.5; .5 MG/3ML; MG/3ML
1 SOLUTION RESPIRATORY (INHALATION) ONCE
Status: COMPLETED | OUTPATIENT
Start: 2024-11-27 | End: 2024-11-27

## 2024-11-27 RX ORDER — DOXYCYCLINE HYCLATE 100 MG
100 TABLET ORAL 2 TIMES DAILY
Qty: 14 TABLET | Refills: 0 | Status: ON HOLD | OUTPATIENT
Start: 2024-11-27 | End: 2024-12-05 | Stop reason: HOSPADM

## 2024-11-27 RX ORDER — PREDNISONE 50 MG/1
50 TABLET ORAL DAILY
Qty: 5 TABLET | Refills: 0 | Status: ON HOLD | OUTPATIENT
Start: 2024-11-27 | End: 2024-12-05 | Stop reason: HOSPADM

## 2024-11-27 RX ORDER — IPRATROPIUM BROMIDE AND ALBUTEROL SULFATE 2.5; .5 MG/3ML; MG/3ML
SOLUTION RESPIRATORY (INHALATION)
Status: DISCONTINUED
Start: 2024-11-27 | End: 2024-11-27 | Stop reason: HOSPADM

## 2024-11-27 RX ADMIN — METHYLPREDNISOLONE SODIUM SUCCINATE 125 MG: 125 INJECTION INTRAMUSCULAR; INTRAVENOUS at 17:57

## 2024-11-27 RX ADMIN — IPRATROPIUM BROMIDE AND ALBUTEROL SULFATE 1 DOSE: 2.5; .5 SOLUTION RESPIRATORY (INHALATION) at 19:19

## 2024-11-27 RX ADMIN — IPRATROPIUM BROMIDE AND ALBUTEROL SULFATE 1 DOSE: 2.5; .5 SOLUTION RESPIRATORY (INHALATION) at 17:56

## 2024-11-27 RX ADMIN — IPRATROPIUM BROMIDE AND ALBUTEROL SULFATE 1 DOSE: 2.5; .5 SOLUTION RESPIRATORY (INHALATION) at 18:00

## 2024-11-27 ASSESSMENT — ENCOUNTER SYMPTOMS
EYE REDNESS: 0
CONSTIPATION: 0
SINUS PRESSURE: 0
SORE THROAT: 0
TROUBLE SWALLOWING: 0
VOMITING: 0
FACIAL SWELLING: 0
COLOR CHANGE: 0
COUGH: 1
BACK PAIN: 0
DIARRHEA: 0
NAUSEA: 0
BLOOD IN STOOL: 0
RHINORRHEA: 0
CHEST TIGHTNESS: 1
ABDOMINAL PAIN: 0
EYE PAIN: 0
EYE DISCHARGE: 0
SHORTNESS OF BREATH: 1
WHEEZING: 1

## 2024-11-27 NOTE — ED PROVIDER NOTES
eMERGENCY dEPARTMENT eNCOUnter      Pt Name: Eugenie Estrada  MRN: [id number]  Birthdate 1970  Date of evaluation: 11/27/24      CHIEF COMPLAINT       Chief Complaint   Patient presents with    Shortness of Breath     Started last night, has COPD.          HISTORY OF PRESENT ILLNESS    Eugenie Estrada is a 54 y.o. female who presents complaining of shortness of breath.  Patient states that she has a history of COPD was recently hospitalized in September for this.  Patient states last night started getting short of breath cough nonproductive sputum with wheezing.  Patient states is just getting worse today she wanted to come in to get checked out.  Patient is had no fevers no pain or swelling in the legs.      REVIEW OF SYSTEMS       Review of Systems   Constitutional:  Negative for activity change, appetite change, chills, diaphoresis and fever.   HENT:  Negative for congestion, ear pain, facial swelling, nosebleeds, rhinorrhea, sinus pressure, sore throat and trouble swallowing.    Eyes:  Negative for pain, discharge and redness.   Respiratory:  Positive for cough, chest tightness, shortness of breath and wheezing.    Cardiovascular:  Negative for chest pain, palpitations and leg swelling.   Gastrointestinal:  Negative for abdominal pain, blood in stool, constipation, diarrhea, nausea and vomiting.   Genitourinary:  Negative for difficulty urinating, dysuria, flank pain, frequency, genital sores and hematuria.   Musculoskeletal:  Negative for arthralgias, back pain, gait problem, joint swelling, myalgias and neck pain.   Skin:  Negative for color change, pallor, rash and wound.   Neurological:  Negative for dizziness, tremors, seizures, syncope, speech difficulty, weakness, numbness and headaches.   Psychiatric/Behavioral:  Negative for confusion, decreased concentration, hallucinations, self-injury, sleep disturbance and suicidal ideas.        PAST MEDICAL HISTORY     Past Medical History:   Diagnosis Date

## 2024-11-28 ENCOUNTER — APPOINTMENT (OUTPATIENT)
Dept: GENERAL RADIOLOGY | Age: 54
DRG: 190 | End: 2024-11-28
Payer: COMMERCIAL

## 2024-11-28 ENCOUNTER — HOSPITAL ENCOUNTER (INPATIENT)
Age: 54
LOS: 7 days | Discharge: HOME | DRG: 190 | End: 2024-12-05
Attending: EMERGENCY MEDICINE | Admitting: INTERNAL MEDICINE
Payer: COMMERCIAL

## 2024-11-28 DIAGNOSIS — R06.02 SHORTNESS OF BREATH: Primary | ICD-10-CM

## 2024-11-28 DIAGNOSIS — J44.1 COPD EXACERBATION (HCC): ICD-10-CM

## 2024-11-28 LAB
ANION GAP SERPL CALCULATED.3IONS-SCNC: 14 MMOL/L (ref 9–16)
B PARAP IS1001 DNA NPH QL NAA+NON-PROBE: NOT DETECTED
B PERT DNA SPEC QL NAA+PROBE: NOT DETECTED
BASOPHILS # BLD: 0 K/UL (ref 0–0.2)
BASOPHILS NFR BLD: 0 % (ref 0–2)
BUN SERPL-MCNC: 14 MG/DL (ref 6–20)
C PNEUM DNA NPH QL NAA+NON-PROBE: NOT DETECTED
CALCIUM SERPL-MCNC: 9.6 MG/DL (ref 8.6–10.4)
CHLORIDE SERPL-SCNC: 103 MMOL/L (ref 98–107)
CO2 SERPL-SCNC: 20 MMOL/L (ref 20–31)
CREAT SERPL-MCNC: 0.9 MG/DL (ref 0.7–1.2)
D DIMER PPP FEU-MCNC: <0.27 UG/ML FEU (ref 0–0.59)
EOSINOPHIL # BLD: 0 K/UL (ref 0–0.4)
EOSINOPHILS RELATIVE PERCENT: 0 % (ref 0–4)
ERYTHROCYTE [DISTWIDTH] IN BLOOD BY AUTOMATED COUNT: 15.3 % (ref 11.5–14.9)
FLUAV AG SPEC QL: NEGATIVE
FLUAV RNA NPH QL NAA+NON-PROBE: NOT DETECTED
FLUBV AG SPEC QL: NEGATIVE
FLUBV RNA NPH QL NAA+NON-PROBE: NOT DETECTED
GFR, ESTIMATED: 77 ML/MIN/1.73M2
GLUCOSE SERPL-MCNC: 105 MG/DL (ref 74–99)
HADV DNA NPH QL NAA+NON-PROBE: NOT DETECTED
HCOV 229E RNA NPH QL NAA+NON-PROBE: NOT DETECTED
HCOV HKU1 RNA NPH QL NAA+NON-PROBE: NOT DETECTED
HCOV NL63 RNA NPH QL NAA+NON-PROBE: NOT DETECTED
HCOV OC43 RNA NPH QL NAA+NON-PROBE: NOT DETECTED
HCT VFR BLD AUTO: 34.8 % (ref 36–46)
HGB BLD-MCNC: 11.7 G/DL (ref 12–16)
HMPV RNA NPH QL NAA+NON-PROBE: NOT DETECTED
HPIV1 RNA NPH QL NAA+NON-PROBE: NOT DETECTED
HPIV2 RNA NPH QL NAA+NON-PROBE: NOT DETECTED
HPIV3 RNA NPH QL NAA+NON-PROBE: NOT DETECTED
HPIV4 RNA NPH QL NAA+NON-PROBE: NOT DETECTED
INR PPP: 0.9
L PNEUMO1 AG UR QL IA.RAPID: NEGATIVE
LYMPHOCYTES NFR BLD: 0.8 K/UL (ref 1–4.8)
LYMPHOCYTES RELATIVE PERCENT: 7 % (ref 24–44)
M PNEUMO DNA NPH QL NAA+NON-PROBE: NOT DETECTED
MAGNESIUM SERPL-MCNC: 2.3 MG/DL (ref 1.6–2.6)
MCH RBC QN AUTO: 29.7 PG (ref 26–34)
MCHC RBC AUTO-ENTMCNC: 33.5 G/DL (ref 31–37)
MCV RBC AUTO: 88.5 FL (ref 80–100)
MONOCYTES NFR BLD: 0.8 K/UL (ref 0.1–1.3)
MONOCYTES NFR BLD: 7 % (ref 1–7)
NEUTROPHILS NFR BLD: 86 % (ref 36–66)
NEUTS SEG NFR BLD: 9.3 K/UL (ref 1.3–9.1)
PARTIAL THROMBOPLASTIN TIME: 26.5 SEC (ref 24–36)
PHOSPHATE SERPL-MCNC: 3.1 MG/DL (ref 2.5–4.5)
PLATELET # BLD AUTO: 219 K/UL (ref 150–450)
PMV BLD AUTO: 7 FL (ref 6–12)
POTASSIUM SERPL-SCNC: 4.2 MMOL/L (ref 3.7–5.3)
PROTHROMBIN TIME: 12.8 SEC (ref 11.8–14.6)
RBC # BLD AUTO: 3.93 M/UL (ref 4–5.2)
RSV RNA NPH QL NAA+NON-PROBE: NOT DETECTED
RV+EV RNA NPH QL NAA+NON-PROBE: DETECTED
S PNEUM AG SPEC QL: NEGATIVE
SARS-COV-2 RNA NPH QL NAA+NON-PROBE: NOT DETECTED
SODIUM SERPL-SCNC: 137 MMOL/L (ref 136–145)
SPECIMEN DESCRIPTION: ABNORMAL
SPECIMEN SOURCE: NORMAL
T4 SERPL-MCNC: 3.1 UG/DL (ref 4.5–11.7)
TROPONIN I SERPL HS-MCNC: 11 NG/L (ref 0–14)
TROPONIN I SERPL HS-MCNC: 13 NG/L (ref 0–14)
TSH SERPL DL<=0.05 MIU/L-ACNC: 0.57 UIU/ML (ref 0.27–4.2)
WBC OTHER # BLD: 10.9 K/UL (ref 3.5–11)

## 2024-11-28 PROCEDURE — 99285 EMERGENCY DEPT VISIT HI MDM: CPT

## 2024-11-28 PROCEDURE — 85379 FIBRIN DEGRADATION QUANT: CPT

## 2024-11-28 PROCEDURE — 2580000003 HC RX 258: Performed by: EMERGENCY MEDICINE

## 2024-11-28 PROCEDURE — 85730 THROMBOPLASTIN TIME PARTIAL: CPT

## 2024-11-28 PROCEDURE — 6370000000 HC RX 637 (ALT 250 FOR IP): Performed by: EMERGENCY MEDICINE

## 2024-11-28 PROCEDURE — 96374 THER/PROPH/DIAG INJ IV PUSH: CPT

## 2024-11-28 PROCEDURE — 87449 NOS EACH ORGANISM AG IA: CPT

## 2024-11-28 PROCEDURE — 87899 AGENT NOS ASSAY W/OPTIC: CPT

## 2024-11-28 PROCEDURE — 85025 COMPLETE CBC W/AUTO DIFF WBC: CPT

## 2024-11-28 PROCEDURE — 1200000000 HC SEMI PRIVATE

## 2024-11-28 PROCEDURE — 0202U NFCT DS 22 TRGT SARS-COV-2: CPT

## 2024-11-28 PROCEDURE — 6360000002 HC RX W HCPCS: Performed by: EMERGENCY MEDICINE

## 2024-11-28 PROCEDURE — 85610 PROTHROMBIN TIME: CPT

## 2024-11-28 PROCEDURE — 84443 ASSAY THYROID STIM HORMONE: CPT

## 2024-11-28 PROCEDURE — 71045 X-RAY EXAM CHEST 1 VIEW: CPT

## 2024-11-28 PROCEDURE — 83735 ASSAY OF MAGNESIUM: CPT

## 2024-11-28 PROCEDURE — 6370000000 HC RX 637 (ALT 250 FOR IP)

## 2024-11-28 PROCEDURE — 84436 ASSAY OF TOTAL THYROXINE: CPT

## 2024-11-28 PROCEDURE — 94761 N-INVAS EAR/PLS OXIMETRY MLT: CPT

## 2024-11-28 PROCEDURE — 93005 ELECTROCARDIOGRAM TRACING: CPT | Performed by: EMERGENCY MEDICINE

## 2024-11-28 PROCEDURE — 94640 AIRWAY INHALATION TREATMENT: CPT

## 2024-11-28 PROCEDURE — 99223 1ST HOSP IP/OBS HIGH 75: CPT | Performed by: INTERNAL MEDICINE

## 2024-11-28 PROCEDURE — 6360000002 HC RX W HCPCS

## 2024-11-28 PROCEDURE — 36415 COLL VENOUS BLD VENIPUNCTURE: CPT

## 2024-11-28 PROCEDURE — 2700000000 HC OXYGEN THERAPY PER DAY

## 2024-11-28 PROCEDURE — 87804 INFLUENZA ASSAY W/OPTIC: CPT

## 2024-11-28 PROCEDURE — 84484 ASSAY OF TROPONIN QUANT: CPT

## 2024-11-28 PROCEDURE — 84100 ASSAY OF PHOSPHORUS: CPT

## 2024-11-28 PROCEDURE — 80048 BASIC METABOLIC PNL TOTAL CA: CPT

## 2024-11-28 PROCEDURE — 87641 MR-STAPH DNA AMP PROBE: CPT

## 2024-11-28 PROCEDURE — 2580000003 HC RX 258

## 2024-11-28 RX ORDER — MIRTAZAPINE 15 MG/1
15 TABLET, ORALLY DISINTEGRATING ORAL NIGHTLY
Status: DISCONTINUED | OUTPATIENT
Start: 2024-11-28 | End: 2024-12-05 | Stop reason: HOSPADM

## 2024-11-28 RX ORDER — NICOTINE 21 MG/24HR
1 PATCH, TRANSDERMAL 24 HOURS TRANSDERMAL DAILY
Status: DISCONTINUED | OUTPATIENT
Start: 2024-11-28 | End: 2024-12-05 | Stop reason: HOSPADM

## 2024-11-28 RX ORDER — POLYETHYLENE GLYCOL 3350 17 G/17G
17 POWDER, FOR SOLUTION ORAL DAILY PRN
Status: DISCONTINUED | OUTPATIENT
Start: 2024-11-28 | End: 2024-12-05 | Stop reason: HOSPADM

## 2024-11-28 RX ORDER — ENOXAPARIN SODIUM 100 MG/ML
40 INJECTION SUBCUTANEOUS DAILY
Status: DISCONTINUED | OUTPATIENT
Start: 2024-11-28 | End: 2024-12-05 | Stop reason: HOSPADM

## 2024-11-28 RX ORDER — ONDANSETRON 2 MG/ML
4 INJECTION INTRAMUSCULAR; INTRAVENOUS EVERY 6 HOURS PRN
Status: DISCONTINUED | OUTPATIENT
Start: 2024-11-28 | End: 2024-12-05 | Stop reason: HOSPADM

## 2024-11-28 RX ORDER — DIPHENHYDRAMINE HCL 25 MG
25 TABLET ORAL EVERY 6 HOURS PRN
Status: DISCONTINUED | OUTPATIENT
Start: 2024-11-28 | End: 2024-12-05 | Stop reason: HOSPADM

## 2024-11-28 RX ORDER — GUAIFENESIN 600 MG/1
600 TABLET, EXTENDED RELEASE ORAL 2 TIMES DAILY
Status: DISCONTINUED | OUTPATIENT
Start: 2024-11-28 | End: 2024-11-29

## 2024-11-28 RX ORDER — QUETIAPINE FUMARATE 200 MG/1
200 TABLET, FILM COATED ORAL EVERY MORNING
Status: DISCONTINUED | OUTPATIENT
Start: 2024-11-29 | End: 2024-11-28

## 2024-11-28 RX ORDER — QUETIAPINE FUMARATE 200 MG/1
400 TABLET, FILM COATED ORAL NIGHTLY
Status: DISCONTINUED | OUTPATIENT
Start: 2024-11-28 | End: 2024-12-05 | Stop reason: HOSPADM

## 2024-11-28 RX ORDER — QUETIAPINE FUMARATE 200 MG/1
200 TABLET, FILM COATED ORAL
Status: DISCONTINUED | OUTPATIENT
Start: 2024-11-28 | End: 2024-11-28

## 2024-11-28 RX ORDER — ESCITALOPRAM OXALATE 20 MG/1
20 TABLET ORAL DAILY
Status: DISCONTINUED | OUTPATIENT
Start: 2024-11-29 | End: 2024-11-29

## 2024-11-28 RX ORDER — ONDANSETRON 4 MG/1
4 TABLET, ORALLY DISINTEGRATING ORAL EVERY 8 HOURS PRN
Status: DISCONTINUED | OUTPATIENT
Start: 2024-11-28 | End: 2024-12-05 | Stop reason: HOSPADM

## 2024-11-28 RX ORDER — SODIUM CHLORIDE 0.9 % (FLUSH) 0.9 %
5-40 SYRINGE (ML) INJECTION PRN
Status: DISCONTINUED | OUTPATIENT
Start: 2024-11-28 | End: 2024-12-05 | Stop reason: HOSPADM

## 2024-11-28 RX ORDER — QUETIAPINE FUMARATE 200 MG/1
200 TABLET, FILM COATED ORAL 2 TIMES DAILY
Status: DISCONTINUED | OUTPATIENT
Start: 2024-11-29 | End: 2024-12-05 | Stop reason: HOSPADM

## 2024-11-28 RX ORDER — SODIUM CHLORIDE 0.9 % (FLUSH) 0.9 %
5-40 SYRINGE (ML) INJECTION EVERY 12 HOURS SCHEDULED
Status: DISCONTINUED | OUTPATIENT
Start: 2024-11-28 | End: 2024-12-05 | Stop reason: HOSPADM

## 2024-11-28 RX ORDER — QUETIAPINE FUMARATE 200 MG/1
400 TABLET, FILM COATED ORAL 2 TIMES DAILY
Status: DISCONTINUED | OUTPATIENT
Start: 2024-11-29 | End: 2024-11-28

## 2024-11-28 RX ORDER — LEVALBUTEROL INHALATION SOLUTION 1.25 MG/3ML
1.25 SOLUTION RESPIRATORY (INHALATION) EVERY 6 HOURS
Status: DISCONTINUED | OUTPATIENT
Start: 2024-11-28 | End: 2024-11-29

## 2024-11-28 RX ORDER — MIRTAZAPINE 15 MG/1
15 TABLET, FILM COATED ORAL NIGHTLY
COMMUNITY

## 2024-11-28 RX ORDER — IPRATROPIUM BROMIDE AND ALBUTEROL SULFATE 2.5; .5 MG/3ML; MG/3ML
1 SOLUTION RESPIRATORY (INHALATION)
Status: DISCONTINUED | OUTPATIENT
Start: 2024-11-28 | End: 2024-11-28

## 2024-11-28 RX ORDER — BUDESONIDE AND FORMOTEROL FUMARATE DIHYDRATE 160; 4.5 UG/1; UG/1
2 AEROSOL RESPIRATORY (INHALATION)
Status: DISCONTINUED | OUTPATIENT
Start: 2024-11-28 | End: 2024-12-05 | Stop reason: HOSPADM

## 2024-11-28 RX ORDER — QUETIAPINE FUMARATE 200 MG/1
200 TABLET, FILM COATED ORAL 2 TIMES DAILY
Status: DISCONTINUED | OUTPATIENT
Start: 2024-11-28 | End: 2024-11-28

## 2024-11-28 RX ORDER — SODIUM CHLORIDE 9 MG/ML
INJECTION, SOLUTION INTRAVENOUS PRN
Status: DISCONTINUED | OUTPATIENT
Start: 2024-11-28 | End: 2024-12-05 | Stop reason: HOSPADM

## 2024-11-28 RX ORDER — ALBUTEROL SULFATE 90 UG/1
2 INHALANT RESPIRATORY (INHALATION) EVERY 6 HOURS PRN
Status: DISCONTINUED | OUTPATIENT
Start: 2024-11-28 | End: 2024-12-05 | Stop reason: HOSPADM

## 2024-11-28 RX ORDER — ACETAMINOPHEN 650 MG/1
650 SUPPOSITORY RECTAL EVERY 6 HOURS PRN
Status: DISCONTINUED | OUTPATIENT
Start: 2024-11-28 | End: 2024-12-05 | Stop reason: HOSPADM

## 2024-11-28 RX ORDER — ACETAMINOPHEN 325 MG/1
650 TABLET ORAL EVERY 6 HOURS PRN
Status: DISCONTINUED | OUTPATIENT
Start: 2024-11-28 | End: 2024-12-05 | Stop reason: HOSPADM

## 2024-11-28 RX ORDER — PRAVASTATIN SODIUM 20 MG
20 TABLET ORAL EVERY EVENING
Status: DISCONTINUED | OUTPATIENT
Start: 2024-11-28 | End: 2024-12-05 | Stop reason: HOSPADM

## 2024-11-28 RX ADMIN — LEVALBUTEROL HYDROCHLORIDE 1.25 MG: 1.25 SOLUTION RESPIRATORY (INHALATION) at 19:35

## 2024-11-28 RX ADMIN — METHYLPREDNISOLONE SODIUM SUCCINATE 40 MG: 40 INJECTION INTRAMUSCULAR; INTRAVENOUS at 22:32

## 2024-11-28 RX ADMIN — IPRATROPIUM BROMIDE AND ALBUTEROL SULFATE 1 DOSE: 2.5; .5 SOLUTION RESPIRATORY (INHALATION) at 14:50

## 2024-11-28 RX ADMIN — PRAVASTATIN SODIUM 20 MG: 20 TABLET ORAL at 22:32

## 2024-11-28 RX ADMIN — IPRATROPIUM BROMIDE AND ALBUTEROL SULFATE 1 DOSE: 2.5; .5 SOLUTION RESPIRATORY (INHALATION) at 15:00

## 2024-11-28 RX ADMIN — SODIUM CHLORIDE, PRESERVATIVE FREE 10 ML: 5 INJECTION INTRAVENOUS at 22:32

## 2024-11-28 RX ADMIN — QUETIAPINE FUMARATE 400 MG: 200 TABLET ORAL at 22:31

## 2024-11-28 RX ADMIN — METHYLPREDNISOLONE SODIUM SUCCINATE 40 MG: 40 INJECTION INTRAMUSCULAR; INTRAVENOUS at 14:39

## 2024-11-28 RX ADMIN — GUAIFENESIN 600 MG: 600 TABLET, EXTENDED RELEASE ORAL at 22:32

## 2024-11-28 RX ADMIN — MIRTAZAPINE 15 MG: 15 TABLET, ORALLY DISINTEGRATING ORAL at 22:32

## 2024-11-28 RX ADMIN — AZITHROMYCIN MONOHYDRATE 500 MG: 500 INJECTION, POWDER, LYOPHILIZED, FOR SOLUTION INTRAVENOUS at 17:50

## 2024-11-28 RX ADMIN — TRAZODONE HYDROCHLORIDE 150 MG: 100 TABLET ORAL at 22:31

## 2024-11-28 RX ADMIN — Medication 2 PUFF: at 19:45

## 2024-11-28 ASSESSMENT — HEART SCORE: ECG: NORMAL

## 2024-11-28 NOTE — ED NOTES
TRANSFER - OUT REPORT:    Verbal report given to [facility] RN on Eugenie Estrada  being transferred to [facility] 2075 for routine progression of patient care       Report consisted of patient's Situation, Background, Assessment and   Recommendations(SBAR).     Information from the following report(s) ED Encounter Summary, ED SBAR, MAR, Recent Results, and Event Log was reviewed with the receiving nurse.    [place] Fall Assessment:    Presents to emergency department  because of falls (Syncope, seizure, or loss of consciousness): No  Age > 70: No  Altered Mental Status, Intoxication with alcohol or substance confusion (Disorientation, impaired judgment, poor safety awaremess, or inability to follow instructions): No  Impaired Mobility: Ambulates or transfers with assistive devices or assistance; Unable to ambulate or transer.: No  Nursing Judgement: No          Lines:   Peripheral IV 11/28/24 Left Forearm (Active)        Opportunity for questions and clarification was provided.      Patient transported with:  O2 @ 2lpm and Tech

## 2024-11-28 NOTE — H&P
regular.    Consultations:  IP CONSULT TO INTERNAL MEDICINE  IP CONSULT TO SOCIAL WORK  IP CONSULT TO PULMONOLOGY    Patient is admitted as inpatient status because of co-morbidities listed above, severity of signs and symptoms as outlined, requirement for current medical therapies and most importantly because of direct risk to patient if care not provided in a hospital setting.    Felicia Jorgensen MD  Family Medicine Resident  11/28/2024   5:15 PM    Copy sent to Amy Gaviria MD

## 2024-11-28 NOTE — ED TRIAGE NOTES
Mode of arrival (squad #, walk in, police, etc) : walk-in        Chief complaint(s): shortness of breath/fatigue        Arrival Note (brief scenario, treatment PTA, etc).: Pt reports above symptoms for a little while. Pt reports she was in last night and they wanted to keep her, but patient wanted to go home due to thanksgiving         C= \"Have you ever felt that you should Cut down on your drinking?\"  No  A= \"Have people Annoyed you by criticizing your drinking?\"  No  G= \"Have you ever felt bad or Guilty about your drinking?\"  No  E= \"Have you ever had a drink as an Eye-opener first thing in the morning to steady your nerves or to help a hangover?\"  No      Deferred []      Reason for deferring: N/A    *If yes to two or more: probable alcohol abuse.*

## 2024-11-28 NOTE — ED PROVIDER NOTES
PORTABLE   Final Result   Chronic pulmonary change without acute cardiopulmonary process.             LABS: Lab orders shown below, the results are reviewed by myself, and all abnormals are listed below.  Labs Reviewed   BASIC METABOLIC PANEL - Abnormal; Notable for the following components:       Result Value    Glucose 105 (*)     All other components within normal limits   CBC WITH AUTO DIFFERENTIAL - Abnormal; Notable for the following components:    RBC 3.93 (*)     Hemoglobin 11.7 (*)     Hematocrit 34.8 (*)     RDW 15.3 (*)     Neutrophils % 86 (*)     Lymphocytes % 7 (*)     Neutrophils Absolute 9.30 (*)     Lymphocytes Absolute 0.80 (*)     All other components within normal limits   TROPONIN   APTT   PROTIME-INR   PHOSPHORUS   MAGNESIUM   TROPONIN       Vitals Reviewed:    Vitals:    11/28/24 1450 11/28/24 1453 11/28/24 1458 11/28/24 1500   BP:   122/76    Pulse: (!) 108 (!) 110  (!) 116   Resp: 22   22   Temp:       TempSrc:       SpO2: 96% 98% 98% 98%   Weight:       Height:         MEDICATIONS GIVEN TO PATIENT THIS ENCOUNTER:  Orders Placed This Encounter   Medications    ipratropium 0.5 mg-albuterol 2.5 mg (DUONEB) nebulizer solution 1 Dose     Order Specific Question:   Initiate RT Bronchodilator Protocol     Answer:   Yes - Inpatient Protocol    methylPREDNISolone sodium succ (SOLU-MEDROL) 40 mg in sterile water 1 mL injection     DISCHARGE PRESCRIPTIONS:  New Prescriptions    No medications on file     PHYSICIAN CONSULTS ORDERED THIS ENCOUNTER:  IP CONSULT TO INTERNAL MEDICINE  FINAL IMPRESSION      1. Shortness of breath    2. COPD exacerbation (HCC)          DISPOSITION/PLAN   DISPOSITION Admitted 11/28/2024 03:35:10 PM           OUTPATIENT FOLLOW UP THE PATIENT:  No follow-up provider specified.    DO Suraj Arellano Sami, DO  11/28/24 1535

## 2024-11-29 LAB
ALBUMIN SERPL-MCNC: 4.1 G/DL (ref 3.5–5.2)
ALP SERPL-CCNC: 87 U/L (ref 35–104)
ALT SERPL-CCNC: 13 U/L (ref 10–35)
ANION GAP SERPL CALCULATED.3IONS-SCNC: 10 MMOL/L (ref 9–16)
AST SERPL-CCNC: 22 U/L (ref 10–35)
BASOPHILS # BLD: 0 K/UL (ref 0–0.2)
BASOPHILS NFR BLD: 0 % (ref 0–2)
BILIRUB SERPL-MCNC: <0.2 MG/DL (ref 0–1.2)
BUN SERPL-MCNC: 18 MG/DL (ref 6–20)
CALCIUM SERPL-MCNC: 9.5 MG/DL (ref 8.6–10.4)
CHLORIDE SERPL-SCNC: 106 MMOL/L (ref 98–107)
CO2 SERPL-SCNC: 22 MMOL/L (ref 20–31)
CREAT SERPL-MCNC: 0.8 MG/DL (ref 0.7–1.2)
EKG ATRIAL RATE: 110 BPM
EKG P AXIS: 107 DEGREES
EKG P-R INTERVAL: 166 MS
EKG Q-T INTERVAL: 340 MS
EKG QRS DURATION: 70 MS
EKG QTC CALCULATION (BAZETT): 460 MS
EKG R AXIS: 127 DEGREES
EKG T AXIS: 134 DEGREES
EKG VENTRICULAR RATE: 110 BPM
EOSINOPHIL # BLD: 0 K/UL (ref 0–0.4)
EOSINOPHILS RELATIVE PERCENT: 0 % (ref 0–4)
ERYTHROCYTE [DISTWIDTH] IN BLOOD BY AUTOMATED COUNT: 15.4 % (ref 11.5–14.9)
GFR, ESTIMATED: 89 ML/MIN/1.73M2
GLUCOSE SERPL-MCNC: 130 MG/DL (ref 74–99)
HCT VFR BLD AUTO: 33.8 % (ref 36–46)
HGB BLD-MCNC: 11.3 G/DL (ref 12–16)
LYMPHOCYTES NFR BLD: 0.78 K/UL (ref 1–4.8)
LYMPHOCYTES RELATIVE PERCENT: 6 % (ref 24–44)
MCH RBC QN AUTO: 30.2 PG (ref 26–34)
MCHC RBC AUTO-ENTMCNC: 33.4 G/DL (ref 31–37)
MCV RBC AUTO: 90.3 FL (ref 80–100)
MONOCYTES NFR BLD: 0.39 K/UL (ref 0.1–1.3)
MONOCYTES NFR BLD: 3 % (ref 1–7)
MORPHOLOGY: ABNORMAL
MRSA, DNA, NASAL: NEGATIVE
NEUTROPHILS NFR BLD: 91 % (ref 36–66)
NEUTS SEG NFR BLD: 11.83 K/UL (ref 1.3–9.1)
PLATELET # BLD AUTO: 214 K/UL (ref 150–450)
PMV BLD AUTO: 7 FL (ref 6–12)
POTASSIUM SERPL-SCNC: 5.1 MMOL/L (ref 3.7–5.3)
PROT SERPL-MCNC: 6.9 G/DL (ref 6.6–8.7)
RBC # BLD AUTO: 3.74 M/UL (ref 4–5.2)
SODIUM SERPL-SCNC: 138 MMOL/L (ref 136–145)
SPECIMEN DESCRIPTION: NORMAL
WBC OTHER # BLD: 13 K/UL (ref 3.5–11)

## 2024-11-29 PROCEDURE — 80053 COMPREHEN METABOLIC PANEL: CPT

## 2024-11-29 PROCEDURE — 6360000002 HC RX W HCPCS: Performed by: NURSE PRACTITIONER

## 2024-11-29 PROCEDURE — 6360000002 HC RX W HCPCS

## 2024-11-29 PROCEDURE — 6370000000 HC RX 637 (ALT 250 FOR IP): Performed by: NURSE PRACTITIONER

## 2024-11-29 PROCEDURE — 99233 SBSQ HOSP IP/OBS HIGH 50: CPT | Performed by: INTERNAL MEDICINE

## 2024-11-29 PROCEDURE — 97161 PT EVAL LOW COMPLEX 20 MIN: CPT

## 2024-11-29 PROCEDURE — 93010 ELECTROCARDIOGRAM REPORT: CPT | Performed by: INTERNAL MEDICINE

## 2024-11-29 PROCEDURE — 6370000000 HC RX 637 (ALT 250 FOR IP)

## 2024-11-29 PROCEDURE — 36415 COLL VENOUS BLD VENIPUNCTURE: CPT

## 2024-11-29 PROCEDURE — 85025 COMPLETE CBC W/AUTO DIFF WBC: CPT

## 2024-11-29 PROCEDURE — 2700000000 HC OXYGEN THERAPY PER DAY

## 2024-11-29 PROCEDURE — 94761 N-INVAS EAR/PLS OXIMETRY MLT: CPT

## 2024-11-29 PROCEDURE — 2580000003 HC RX 258

## 2024-11-29 PROCEDURE — 1200000000 HC SEMI PRIVATE

## 2024-11-29 PROCEDURE — 94640 AIRWAY INHALATION TREATMENT: CPT

## 2024-11-29 PROCEDURE — 97116 GAIT TRAINING THERAPY: CPT

## 2024-11-29 RX ORDER — ESCITALOPRAM OXALATE 20 MG/1
20 TABLET ORAL NIGHTLY
Status: DISCONTINUED | OUTPATIENT
Start: 2024-11-29 | End: 2024-12-05 | Stop reason: HOSPADM

## 2024-11-29 RX ORDER — GUAIFENESIN 600 MG/1
1200 TABLET, EXTENDED RELEASE ORAL 2 TIMES DAILY
Status: DISCONTINUED | OUTPATIENT
Start: 2024-11-29 | End: 2024-12-05 | Stop reason: HOSPADM

## 2024-11-29 RX ORDER — LEVALBUTEROL INHALATION SOLUTION 1.25 MG/3ML
1.25 SOLUTION RESPIRATORY (INHALATION)
Status: DISCONTINUED | OUTPATIENT
Start: 2024-11-29 | End: 2024-12-05 | Stop reason: HOSPADM

## 2024-11-29 RX ORDER — CLONAZEPAM 0.5 MG/1
0.25 TABLET ORAL 3 TIMES DAILY
Status: DISCONTINUED | OUTPATIENT
Start: 2024-11-29 | End: 2024-12-05 | Stop reason: HOSPADM

## 2024-11-29 RX ORDER — DEXTROMETHORPHAN POLISTIREX 30 MG/5ML
60 SUSPENSION ORAL EVERY 12 HOURS SCHEDULED
Status: DISCONTINUED | OUTPATIENT
Start: 2024-11-29 | End: 2024-12-05 | Stop reason: HOSPADM

## 2024-11-29 RX ORDER — LEVALBUTEROL INHALATION SOLUTION 1.25 MG/3ML
1.25 SOLUTION RESPIRATORY (INHALATION) EVERY 4 HOURS PRN
Status: DISCONTINUED | OUTPATIENT
Start: 2024-11-29 | End: 2024-12-05 | Stop reason: HOSPADM

## 2024-11-29 RX ORDER — BENZONATATE 200 MG/1
200 CAPSULE ORAL 3 TIMES DAILY
Status: DISCONTINUED | OUTPATIENT
Start: 2024-11-29 | End: 2024-12-05 | Stop reason: HOSPADM

## 2024-11-29 RX ADMIN — QUETIAPINE FUMARATE 400 MG: 200 TABLET ORAL at 20:24

## 2024-11-29 RX ADMIN — QUETIAPINE FUMARATE 200 MG: 200 TABLET ORAL at 08:03

## 2024-11-29 RX ADMIN — CLONAZEPAM 0.25 MG: 0.5 TABLET ORAL at 20:24

## 2024-11-29 RX ADMIN — BENZONATATE 200 MG: 200 CAPSULE ORAL at 20:24

## 2024-11-29 RX ADMIN — Medication 2 PUFF: at 20:12

## 2024-11-29 RX ADMIN — BENZONATATE 200 MG: 200 CAPSULE ORAL at 14:52

## 2024-11-29 RX ADMIN — METHYLPREDNISOLONE SODIUM SUCCINATE 40 MG: 40 INJECTION INTRAMUSCULAR; INTRAVENOUS at 20:25

## 2024-11-29 RX ADMIN — Medication 2 PUFF: at 08:25

## 2024-11-29 RX ADMIN — LEVALBUTEROL HYDROCHLORIDE 1.25 MG: 1.25 SOLUTION RESPIRATORY (INHALATION) at 20:02

## 2024-11-29 RX ADMIN — AZITHROMYCIN MONOHYDRATE 500 MG: 500 INJECTION, POWDER, LYOPHILIZED, FOR SOLUTION INTRAVENOUS at 16:53

## 2024-11-29 RX ADMIN — METHYLPREDNISOLONE SODIUM SUCCINATE 40 MG: 40 INJECTION INTRAMUSCULAR; INTRAVENOUS at 08:04

## 2024-11-29 RX ADMIN — LEVALBUTEROL HYDROCHLORIDE 1.25 MG: 1.25 SOLUTION RESPIRATORY (INHALATION) at 15:52

## 2024-11-29 RX ADMIN — SODIUM CHLORIDE, PRESERVATIVE FREE 10 ML: 5 INJECTION INTRAVENOUS at 20:25

## 2024-11-29 RX ADMIN — GUAIFENESIN 600 MG: 600 TABLET, EXTENDED RELEASE ORAL at 08:03

## 2024-11-29 RX ADMIN — PRAVASTATIN SODIUM 20 MG: 20 TABLET ORAL at 17:57

## 2024-11-29 RX ADMIN — MIRTAZAPINE 15 MG: 15 TABLET, ORALLY DISINTEGRATING ORAL at 20:25

## 2024-11-29 RX ADMIN — GUAIFENESIN 1200 MG: 600 TABLET, EXTENDED RELEASE ORAL at 20:25

## 2024-11-29 RX ADMIN — ACETAMINOPHEN 650 MG: 325 TABLET ORAL at 17:57

## 2024-11-29 RX ADMIN — TRAZODONE HYDROCHLORIDE 150 MG: 100 TABLET ORAL at 20:24

## 2024-11-29 RX ADMIN — CLONAZEPAM 0.25 MG: 0.5 TABLET ORAL at 14:53

## 2024-11-29 RX ADMIN — METHYLPREDNISOLONE SODIUM SUCCINATE 40 MG: 40 INJECTION INTRAMUSCULAR; INTRAVENOUS at 04:32

## 2024-11-29 RX ADMIN — Medication 60 MG: at 20:33

## 2024-11-29 RX ADMIN — SODIUM CHLORIDE, PRESERVATIVE FREE 10 ML: 5 INJECTION INTRAVENOUS at 08:03

## 2024-11-29 RX ADMIN — METHYLPREDNISOLONE SODIUM SUCCINATE 40 MG: 40 INJECTION INTRAMUSCULAR; INTRAVENOUS at 14:53

## 2024-11-29 RX ADMIN — LEVALBUTEROL HYDROCHLORIDE 1.25 MG: 1.25 SOLUTION RESPIRATORY (INHALATION) at 08:24

## 2024-11-29 RX ADMIN — LEVALBUTEROL HYDROCHLORIDE 1.25 MG: 1.25 SOLUTION RESPIRATORY (INHALATION) at 00:54

## 2024-11-29 RX ADMIN — QUETIAPINE FUMARATE 200 MG: 200 TABLET ORAL at 14:53

## 2024-11-29 RX ADMIN — ESCITALOPRAM OXALATE 20 MG: 20 TABLET ORAL at 20:24

## 2024-11-29 RX ADMIN — IPRATROPIUM BROMIDE 0.5 MG: 0.5 SOLUTION RESPIRATORY (INHALATION) at 15:53

## 2024-11-29 RX ADMIN — CLONAZEPAM 0.25 MG: 0.5 TABLET ORAL at 08:26

## 2024-11-29 RX ADMIN — IPRATROPIUM BROMIDE 0.5 MG: 0.5 SOLUTION RESPIRATORY (INHALATION) at 20:02

## 2024-11-29 ASSESSMENT — PAIN DESCRIPTION - LOCATION: LOCATION: HEAD

## 2024-11-29 ASSESSMENT — PAIN SCALES - GENERAL: PAINLEVEL_OUTOF10: 7

## 2024-11-29 NOTE — CARE COORDINATION
Case Management Assessment  Initial Evaluation    Date/Time of Evaluation: 11/29/2024 10:40 AM  Assessment Completed by: Kendra Paz RN    If patient is discharged prior to next notation, then this note serves as note for discharge by case management.    Patient Name: Eugenie Estrada                   YOB: 1970  Diagnosis: Shortness of breath [R06.02]  COPD exacerbation (HCC) [J44.1]                   Date / Time: 11/28/2024  2:14 PM    Patient Admission Status: Inpatient   Readmission Risk (Low < 19, Mod (19-27), High > 27): Readmission Risk Score: 17.3    Current PCP: Amy Joseph MD  PCP verified by CM? Yes    Chart Reviewed: Yes      History Provided by: Patient  Patient Orientation: Alert and Oriented    Patient Cognition: Alert    Hospitalization in the last 30 days (Readmission):  No    If yes, Readmission Assessment in CM Navigator will be completed.    Advance Directives:      Code Status: Full Code   Patient's Primary Decision Maker is: Legal Next of Kin      Discharge Planning:    Patient lives with: Friends Type of Home: House  Primary Care Giver: Self  Patient Support Systems include: Children, Friends/Neighbors   Current Financial resources: Medicare  Current community resources: None  Current services prior to admission: None            Current DME:              Type of Home Care services:  None    ADLS  Prior functional level: Assistance with the following:, Mobility  Current functional level: Mobility, Assistance with the following:    PT AM-PAC:   /24  OT AM-PAC:   /24    Family can provide assistance at DC: No  Would you like Case Management to discuss the discharge plan with any other family members/significant others, and if so, who? No  Plans to Return to Present Housing: Yes  Other Identified Issues/Barriers to RETURNING to current housing: Shortness of breath.  Potential Assistance needed at discharge: N/A            Potential DME:    Patient expects to discharge to:

## 2024-11-29 NOTE — PLAN OF CARE
Problem: Safety - Adult  Goal: Free from fall injury  11/29/2024 1627 by Wilmer Sánchez, RN  Outcome: Progressing, Patient remains free of incidence/ injury. Bed remains in low position. Side rails up x2.

## 2024-11-29 NOTE — CONSULTS
follow-up in office posthospitalization as she will need PFT  10.  Discussed with Dr. Monsivais, agrees current plan of care    Thank you for the consult, we will continue to follow along          [facility]    Electronically signed by LUCIAN Hogan - SABINE on 11/29/24

## 2024-11-29 NOTE — PLAN OF CARE
Problem: Discharge Planning  Goal: Discharge to home or other facility with appropriate resources  11/29/2024 0443 by Lori Cisse, RN  Outcome: Progressing     Problem: Safety - Adult  Goal: Free from fall injury  Outcome: Progressing

## 2024-11-30 LAB
ALBUMIN SERPL-MCNC: 4 G/DL (ref 3.5–5.2)
ALP SERPL-CCNC: 80 U/L (ref 35–104)
ALT SERPL-CCNC: 14 U/L (ref 10–35)
ANION GAP SERPL CALCULATED.3IONS-SCNC: 10 MMOL/L (ref 9–16)
AST SERPL-CCNC: 22 U/L (ref 10–35)
BASOPHILS # BLD: 0 K/UL (ref 0–0.2)
BASOPHILS NFR BLD: 0 % (ref 0–2)
BILIRUB SERPL-MCNC: <0.2 MG/DL (ref 0–1.2)
BUN SERPL-MCNC: 25 MG/DL (ref 6–20)
CALCIUM SERPL-MCNC: 9.3 MG/DL (ref 8.6–10.4)
CHLORIDE SERPL-SCNC: 106 MMOL/L (ref 98–107)
CO2 SERPL-SCNC: 20 MMOL/L (ref 20–31)
CREAT SERPL-MCNC: 0.9 MG/DL (ref 0.7–1.2)
EOSINOPHIL # BLD: 0 K/UL (ref 0–0.4)
EOSINOPHILS RELATIVE PERCENT: 0 % (ref 0–4)
ERYTHROCYTE [DISTWIDTH] IN BLOOD BY AUTOMATED COUNT: 16 % (ref 11.5–14.9)
GFR, ESTIMATED: 77 ML/MIN/1.73M2
GLUCOSE SERPL-MCNC: 132 MG/DL (ref 74–99)
HCT VFR BLD AUTO: 33 % (ref 36–46)
HGB BLD-MCNC: 10.9 G/DL (ref 12–16)
LYMPHOCYTES NFR BLD: 0.63 K/UL (ref 1–4.8)
LYMPHOCYTES RELATIVE PERCENT: 5 % (ref 24–44)
MCH RBC QN AUTO: 29.7 PG (ref 26–34)
MCHC RBC AUTO-ENTMCNC: 32.9 G/DL (ref 31–37)
MCV RBC AUTO: 90.1 FL (ref 80–100)
MONOCYTES NFR BLD: 0.25 K/UL (ref 0.1–1.3)
MONOCYTES NFR BLD: 2 % (ref 1–7)
MORPHOLOGY: ABNORMAL
NEUTROPHILS NFR BLD: 93 % (ref 36–66)
NEUTS SEG NFR BLD: 11.72 K/UL (ref 1.3–9.1)
PLATELET # BLD AUTO: 226 K/UL (ref 150–450)
PMV BLD AUTO: 7.2 FL (ref 6–12)
POTASSIUM SERPL-SCNC: 4.6 MMOL/L (ref 3.7–5.3)
PROT SERPL-MCNC: 6.7 G/DL (ref 6.6–8.7)
RBC # BLD AUTO: 3.67 M/UL (ref 4–5.2)
SODIUM SERPL-SCNC: 136 MMOL/L (ref 136–145)
WBC OTHER # BLD: 12.6 K/UL (ref 3.5–11)

## 2024-11-30 PROCEDURE — 6360000002 HC RX W HCPCS

## 2024-11-30 PROCEDURE — 6370000000 HC RX 637 (ALT 250 FOR IP)

## 2024-11-30 PROCEDURE — 85025 COMPLETE CBC W/AUTO DIFF WBC: CPT

## 2024-11-30 PROCEDURE — 6370000000 HC RX 637 (ALT 250 FOR IP): Performed by: NURSE PRACTITIONER

## 2024-11-30 PROCEDURE — 2700000000 HC OXYGEN THERAPY PER DAY

## 2024-11-30 PROCEDURE — 80053 COMPREHEN METABOLIC PANEL: CPT

## 2024-11-30 PROCEDURE — 6360000002 HC RX W HCPCS: Performed by: NURSE PRACTITIONER

## 2024-11-30 PROCEDURE — 2580000003 HC RX 258

## 2024-11-30 PROCEDURE — 36415 COLL VENOUS BLD VENIPUNCTURE: CPT

## 2024-11-30 PROCEDURE — 94761 N-INVAS EAR/PLS OXIMETRY MLT: CPT

## 2024-11-30 PROCEDURE — 1200000000 HC SEMI PRIVATE

## 2024-11-30 PROCEDURE — 99233 SBSQ HOSP IP/OBS HIGH 50: CPT | Performed by: INTERNAL MEDICINE

## 2024-11-30 PROCEDURE — 94640 AIRWAY INHALATION TREATMENT: CPT

## 2024-11-30 RX ADMIN — CLONAZEPAM 0.25 MG: 0.5 TABLET ORAL at 08:43

## 2024-11-30 RX ADMIN — AZITHROMYCIN MONOHYDRATE 500 MG: 500 INJECTION, POWDER, LYOPHILIZED, FOR SOLUTION INTRAVENOUS at 17:28

## 2024-11-30 RX ADMIN — IPRATROPIUM BROMIDE 0.5 MG: 0.5 SOLUTION RESPIRATORY (INHALATION) at 06:50

## 2024-11-30 RX ADMIN — IPRATROPIUM BROMIDE 0.5 MG: 0.5 SOLUTION RESPIRATORY (INHALATION) at 14:35

## 2024-11-30 RX ADMIN — BENZONATATE 200 MG: 200 CAPSULE ORAL at 08:29

## 2024-11-30 RX ADMIN — LEVALBUTEROL HYDROCHLORIDE 1.25 MG: 1.25 SOLUTION RESPIRATORY (INHALATION) at 19:32

## 2024-11-30 RX ADMIN — ESCITALOPRAM OXALATE 20 MG: 20 TABLET ORAL at 20:30

## 2024-11-30 RX ADMIN — Medication 2 PUFF: at 07:00

## 2024-11-30 RX ADMIN — IPRATROPIUM BROMIDE 0.5 MG: 0.5 SOLUTION RESPIRATORY (INHALATION) at 10:35

## 2024-11-30 RX ADMIN — SODIUM CHLORIDE, PRESERVATIVE FREE 10 ML: 5 INJECTION INTRAVENOUS at 20:36

## 2024-11-30 RX ADMIN — GUAIFENESIN 1200 MG: 600 TABLET, EXTENDED RELEASE ORAL at 08:33

## 2024-11-30 RX ADMIN — BENZONATATE 200 MG: 200 CAPSULE ORAL at 20:31

## 2024-11-30 RX ADMIN — QUETIAPINE FUMARATE 400 MG: 200 TABLET ORAL at 20:31

## 2024-11-30 RX ADMIN — METHYLPREDNISOLONE SODIUM SUCCINATE 40 MG: 40 INJECTION INTRAMUSCULAR; INTRAVENOUS at 13:18

## 2024-11-30 RX ADMIN — QUETIAPINE FUMARATE 200 MG: 200 TABLET ORAL at 13:17

## 2024-11-30 RX ADMIN — QUETIAPINE FUMARATE 200 MG: 200 TABLET ORAL at 08:30

## 2024-11-30 RX ADMIN — Medication 60 MG: at 08:29

## 2024-11-30 RX ADMIN — METHYLPREDNISOLONE SODIUM SUCCINATE 40 MG: 40 INJECTION INTRAMUSCULAR; INTRAVENOUS at 08:30

## 2024-11-30 RX ADMIN — LEVALBUTEROL HYDROCHLORIDE 1.25 MG: 1.25 SOLUTION RESPIRATORY (INHALATION) at 14:35

## 2024-11-30 RX ADMIN — BENZONATATE 200 MG: 200 CAPSULE ORAL at 17:24

## 2024-11-30 RX ADMIN — LEVALBUTEROL HYDROCHLORIDE 1.25 MG: 1.25 SOLUTION RESPIRATORY (INHALATION) at 10:35

## 2024-11-30 RX ADMIN — CLONAZEPAM 0.25 MG: 0.5 TABLET ORAL at 20:30

## 2024-11-30 RX ADMIN — CLONAZEPAM 0.25 MG: 0.5 TABLET ORAL at 17:24

## 2024-11-30 RX ADMIN — TRAZODONE HYDROCHLORIDE 150 MG: 100 TABLET ORAL at 20:31

## 2024-11-30 RX ADMIN — ACETAMINOPHEN 650 MG: 325 TABLET ORAL at 08:28

## 2024-11-30 RX ADMIN — METHYLPREDNISOLONE SODIUM SUCCINATE 40 MG: 40 INJECTION INTRAMUSCULAR; INTRAVENOUS at 20:34

## 2024-11-30 RX ADMIN — MIRTAZAPINE 15 MG: 15 TABLET, ORALLY DISINTEGRATING ORAL at 20:34

## 2024-11-30 RX ADMIN — METHYLPREDNISOLONE SODIUM SUCCINATE 40 MG: 40 INJECTION INTRAMUSCULAR; INTRAVENOUS at 02:32

## 2024-11-30 RX ADMIN — PRAVASTATIN SODIUM 20 MG: 20 TABLET ORAL at 17:24

## 2024-11-30 RX ADMIN — IPRATROPIUM BROMIDE 0.5 MG: 0.5 SOLUTION RESPIRATORY (INHALATION) at 19:33

## 2024-11-30 RX ADMIN — Medication 60 MG: at 20:31

## 2024-11-30 RX ADMIN — Medication 2 PUFF: at 19:33

## 2024-11-30 RX ADMIN — GUAIFENESIN 1200 MG: 600 TABLET, EXTENDED RELEASE ORAL at 20:31

## 2024-11-30 RX ADMIN — LEVALBUTEROL HYDROCHLORIDE 1.25 MG: 1.25 SOLUTION RESPIRATORY (INHALATION) at 06:50

## 2024-11-30 RX ADMIN — SODIUM CHLORIDE, PRESERVATIVE FREE 10 ML: 5 INJECTION INTRAVENOUS at 08:31

## 2024-11-30 ASSESSMENT — PAIN DESCRIPTION - DESCRIPTORS: DESCRIPTORS: ACHING

## 2024-11-30 ASSESSMENT — PAIN DESCRIPTION - LOCATION
LOCATION: HEAD
LOCATION: HEAD

## 2024-11-30 ASSESSMENT — PAIN SCALES - GENERAL
PAINLEVEL_OUTOF10: 7
PAINLEVEL_OUTOF10: 2
PAINLEVEL_OUTOF10: 0

## 2024-11-30 ASSESSMENT — PAIN - FUNCTIONAL ASSESSMENT: PAIN_FUNCTIONAL_ASSESSMENT: ACTIVITIES ARE NOT PREVENTED

## 2024-11-30 NOTE — PLAN OF CARE
Problem: Discharge Planning  Goal: Discharge to home or other facility with appropriate resources  11/30/2024 1324 by Joi Perrin RN  Outcome: Progressing  Flowsheets (Taken 11/30/2024 1324)  Discharge to home or other facility with appropriate resources: Identify barriers to discharge with patient and caregiver  11/30/2024 0440 by Gaetano Bermudez RN  Outcome: Progressing     Problem: Safety - Adult  Goal: Free from fall injury  11/30/2024 1324 by Joi Perrin RN  Outcome: Progressing  Flowsheets (Taken 11/30/2024 1324)  Free From Fall Injury: Instruct family/caregiver on patient safety  11/30/2024 0440 by Gaetano Bermudez RN  Outcome: Progressing     Problem: Pain  Goal: Verbalizes/displays adequate comfort level or baseline comfort level  Outcome: Progressing  Flowsheets (Taken 11/30/2024 1324)  Verbalizes/displays adequate comfort level or baseline comfort level:   Encourage patient to monitor pain and request assistance   Assess pain using appropriate pain scale   Administer analgesics based on type and severity of pain and evaluate response

## 2024-11-30 NOTE — PLAN OF CARE
Problem: Discharge Planning  Goal: Discharge to home or other facility with appropriate resources  11/30/2024 0440 by Gaetano Bermudez, RN  Outcome: Progressing     Problem: Safety - Adult  Goal: Free from fall injury  11/30/2024 0440 by Gaetano Bermudez, RN  Outcome: Progressing

## 2024-12-01 LAB
ALBUMIN SERPL-MCNC: 4 G/DL (ref 3.5–5.2)
ALP SERPL-CCNC: 76 U/L (ref 35–104)
ALT SERPL-CCNC: 23 U/L (ref 10–35)
ANION GAP SERPL CALCULATED.3IONS-SCNC: 10 MMOL/L (ref 9–16)
AST SERPL-CCNC: 24 U/L (ref 10–35)
BASOPHILS # BLD: 0 K/UL (ref 0–0.2)
BASOPHILS NFR BLD: 0 % (ref 0–2)
BILIRUB SERPL-MCNC: <0.2 MG/DL (ref 0–1.2)
BUN SERPL-MCNC: 25 MG/DL (ref 6–20)
CALCIUM SERPL-MCNC: 9.4 MG/DL (ref 8.6–10.4)
CHLORIDE SERPL-SCNC: 105 MMOL/L (ref 98–107)
CO2 SERPL-SCNC: 22 MMOL/L (ref 20–31)
CREAT SERPL-MCNC: 0.9 MG/DL (ref 0.7–1.2)
EOSINOPHIL # BLD: 0 K/UL (ref 0–0.4)
EOSINOPHILS RELATIVE PERCENT: 0 % (ref 0–4)
ERYTHROCYTE [DISTWIDTH] IN BLOOD BY AUTOMATED COUNT: 15.2 % (ref 11.5–14.9)
GFR, ESTIMATED: 77 ML/MIN/1.73M2
GLUCOSE SERPL-MCNC: 124 MG/DL (ref 74–99)
HCT VFR BLD AUTO: 34 % (ref 36–46)
HGB BLD-MCNC: 11.3 G/DL (ref 12–16)
LYMPHOCYTES NFR BLD: 0.8 K/UL (ref 1–4.8)
LYMPHOCYTES RELATIVE PERCENT: 7 % (ref 24–44)
MCH RBC QN AUTO: 29.4 PG (ref 26–34)
MCHC RBC AUTO-ENTMCNC: 33.2 G/DL (ref 31–37)
MCV RBC AUTO: 88.6 FL (ref 80–100)
MONOCYTES NFR BLD: 0.3 K/UL (ref 0.1–1.3)
MONOCYTES NFR BLD: 3 % (ref 1–7)
NEUTROPHILS NFR BLD: 90 % (ref 36–66)
NEUTS SEG NFR BLD: 10.8 K/UL (ref 1.3–9.1)
PLATELET # BLD AUTO: 233 K/UL (ref 150–450)
PMV BLD AUTO: 7 FL (ref 6–12)
POTASSIUM SERPL-SCNC: 4.8 MMOL/L (ref 3.7–5.3)
PROT SERPL-MCNC: 6.4 G/DL (ref 6.6–8.7)
RBC # BLD AUTO: 3.83 M/UL (ref 4–5.2)
SODIUM SERPL-SCNC: 137 MMOL/L (ref 136–145)
WBC OTHER # BLD: 11.9 K/UL (ref 3.5–11)

## 2024-12-01 PROCEDURE — 80053 COMPREHEN METABOLIC PANEL: CPT

## 2024-12-01 PROCEDURE — 6360000002 HC RX W HCPCS

## 2024-12-01 PROCEDURE — 85025 COMPLETE CBC W/AUTO DIFF WBC: CPT

## 2024-12-01 PROCEDURE — 1200000000 HC SEMI PRIVATE

## 2024-12-01 PROCEDURE — 6370000000 HC RX 637 (ALT 250 FOR IP)

## 2024-12-01 PROCEDURE — 99233 SBSQ HOSP IP/OBS HIGH 50: CPT | Performed by: INTERNAL MEDICINE

## 2024-12-01 PROCEDURE — 36415 COLL VENOUS BLD VENIPUNCTURE: CPT

## 2024-12-01 PROCEDURE — 2580000003 HC RX 258

## 2024-12-01 PROCEDURE — 2700000000 HC OXYGEN THERAPY PER DAY

## 2024-12-01 PROCEDURE — 6370000000 HC RX 637 (ALT 250 FOR IP): Performed by: NURSE PRACTITIONER

## 2024-12-01 PROCEDURE — 6360000002 HC RX W HCPCS: Performed by: NURSE PRACTITIONER

## 2024-12-01 PROCEDURE — 94761 N-INVAS EAR/PLS OXIMETRY MLT: CPT

## 2024-12-01 PROCEDURE — 94640 AIRWAY INHALATION TREATMENT: CPT

## 2024-12-01 RX ADMIN — QUETIAPINE FUMARATE 200 MG: 200 TABLET ORAL at 12:58

## 2024-12-01 RX ADMIN — BENZONATATE 200 MG: 200 CAPSULE ORAL at 14:27

## 2024-12-01 RX ADMIN — SODIUM CHLORIDE, PRESERVATIVE FREE 10 ML: 5 INJECTION INTRAVENOUS at 21:36

## 2024-12-01 RX ADMIN — LEVALBUTEROL HYDROCHLORIDE 1.25 MG: 1.25 SOLUTION RESPIRATORY (INHALATION) at 14:41

## 2024-12-01 RX ADMIN — POLYETHYLENE GLYCOL 3350 17 G: 17 POWDER, FOR SOLUTION ORAL at 22:09

## 2024-12-01 RX ADMIN — TRAZODONE HYDROCHLORIDE 150 MG: 100 TABLET ORAL at 21:35

## 2024-12-01 RX ADMIN — BENZONATATE 200 MG: 200 CAPSULE ORAL at 08:20

## 2024-12-01 RX ADMIN — IPRATROPIUM BROMIDE 0.5 MG: 0.5 SOLUTION RESPIRATORY (INHALATION) at 19:41

## 2024-12-01 RX ADMIN — ENOXAPARIN SODIUM 40 MG: 100 INJECTION SUBCUTANEOUS at 08:20

## 2024-12-01 RX ADMIN — ACETAMINOPHEN 650 MG: 325 TABLET ORAL at 21:34

## 2024-12-01 RX ADMIN — BENZONATATE 200 MG: 200 CAPSULE ORAL at 21:36

## 2024-12-01 RX ADMIN — CLONAZEPAM 0.25 MG: 0.5 TABLET ORAL at 14:27

## 2024-12-01 RX ADMIN — SODIUM CHLORIDE, PRESERVATIVE FREE 10 ML: 5 INJECTION INTRAVENOUS at 08:21

## 2024-12-01 RX ADMIN — Medication 2 PUFF: at 07:03

## 2024-12-01 RX ADMIN — METHYLPREDNISOLONE SODIUM SUCCINATE 40 MG: 40 INJECTION INTRAMUSCULAR; INTRAVENOUS at 08:21

## 2024-12-01 RX ADMIN — IPRATROPIUM BROMIDE 0.5 MG: 0.5 SOLUTION RESPIRATORY (INHALATION) at 10:32

## 2024-12-01 RX ADMIN — QUETIAPINE FUMARATE 200 MG: 200 TABLET ORAL at 08:19

## 2024-12-01 RX ADMIN — METHYLPREDNISOLONE SODIUM SUCCINATE 40 MG: 40 INJECTION INTRAMUSCULAR; INTRAVENOUS at 21:37

## 2024-12-01 RX ADMIN — LEVALBUTEROL HYDROCHLORIDE 1.25 MG: 1.25 SOLUTION RESPIRATORY (INHALATION) at 19:41

## 2024-12-01 RX ADMIN — METHYLPREDNISOLONE SODIUM SUCCINATE 40 MG: 40 INJECTION INTRAMUSCULAR; INTRAVENOUS at 01:50

## 2024-12-01 RX ADMIN — MIRTAZAPINE 15 MG: 15 TABLET, ORALLY DISINTEGRATING ORAL at 21:40

## 2024-12-01 RX ADMIN — GUAIFENESIN 1200 MG: 600 TABLET, EXTENDED RELEASE ORAL at 08:24

## 2024-12-01 RX ADMIN — CLONAZEPAM 0.25 MG: 0.5 TABLET ORAL at 08:24

## 2024-12-01 RX ADMIN — Medication 2 PUFF: at 19:51

## 2024-12-01 RX ADMIN — METHYLPREDNISOLONE SODIUM SUCCINATE 40 MG: 40 INJECTION INTRAMUSCULAR; INTRAVENOUS at 12:58

## 2024-12-01 RX ADMIN — PRAVASTATIN SODIUM 20 MG: 20 TABLET ORAL at 17:08

## 2024-12-01 RX ADMIN — QUETIAPINE FUMARATE 400 MG: 200 TABLET ORAL at 21:36

## 2024-12-01 RX ADMIN — CLONAZEPAM 0.25 MG: 0.5 TABLET ORAL at 21:35

## 2024-12-01 RX ADMIN — ESCITALOPRAM OXALATE 20 MG: 20 TABLET ORAL at 21:35

## 2024-12-01 RX ADMIN — GUAIFENESIN 1200 MG: 600 TABLET, EXTENDED RELEASE ORAL at 21:36

## 2024-12-01 RX ADMIN — Medication 60 MG: at 21:36

## 2024-12-01 RX ADMIN — IPRATROPIUM BROMIDE 0.5 MG: 0.5 SOLUTION RESPIRATORY (INHALATION) at 06:53

## 2024-12-01 RX ADMIN — LEVALBUTEROL HYDROCHLORIDE 1.25 MG: 1.25 SOLUTION RESPIRATORY (INHALATION) at 10:32

## 2024-12-01 RX ADMIN — ACETAMINOPHEN 650 MG: 325 TABLET ORAL at 08:20

## 2024-12-01 RX ADMIN — IPRATROPIUM BROMIDE 0.5 MG: 0.5 SOLUTION RESPIRATORY (INHALATION) at 14:41

## 2024-12-01 RX ADMIN — LEVALBUTEROL HYDROCHLORIDE 1.25 MG: 1.25 SOLUTION RESPIRATORY (INHALATION) at 06:53

## 2024-12-01 RX ADMIN — Medication 60 MG: at 08:21

## 2024-12-01 ASSESSMENT — PAIN DESCRIPTION - PAIN TYPE: TYPE: ACUTE PAIN

## 2024-12-01 ASSESSMENT — PAIN - FUNCTIONAL ASSESSMENT
PAIN_FUNCTIONAL_ASSESSMENT: ACTIVITIES ARE NOT PREVENTED
PAIN_FUNCTIONAL_ASSESSMENT: ACTIVITIES ARE NOT PREVENTED

## 2024-12-01 ASSESSMENT — PAIN DESCRIPTION - LOCATION
LOCATION: HEAD
LOCATION: HEAD

## 2024-12-01 ASSESSMENT — PAIN DESCRIPTION - DESCRIPTORS
DESCRIPTORS: THROBBING
DESCRIPTORS: ACHING

## 2024-12-01 ASSESSMENT — PAIN SCALES - GENERAL
PAINLEVEL_OUTOF10: 0
PAINLEVEL_OUTOF10: 6
PAINLEVEL_OUTOF10: 2

## 2024-12-01 ASSESSMENT — PAIN DESCRIPTION - FREQUENCY: FREQUENCY: CONTINUOUS

## 2024-12-01 ASSESSMENT — PAIN DESCRIPTION - ORIENTATION: ORIENTATION: PROXIMAL

## 2024-12-01 NOTE — PLAN OF CARE
Problem: Discharge Planning  Goal: Discharge to home or other facility with appropriate resources  Outcome: Progressing  Flowsheets (Taken 11/30/2024 2000)  Discharge to home or other facility with appropriate resources:   Identify barriers to discharge with patient and caregiver   Arrange for needed discharge resources and transportation as appropriate   Identify discharge learning needs (meds, wound care, etc)     Problem: Safety - Adult  Goal: Free from fall injury  Outcome: Progressing  Flowsheets (Taken 12/1/2024 0351)  Free From Fall Injury: Instruct family/caregiver on patient safety     Problem: Pain  Goal: Verbalizes/displays adequate comfort level or baseline comfort level  Outcome: Progressing  Flowsheets (Taken 11/30/2024 2000)  Verbalizes/displays adequate comfort level or baseline comfort level:   Encourage patient to monitor pain and request assistance   Assess pain using appropriate pain scale   Administer analgesics based on type and severity of pain and evaluate response   Implement non-pharmacological measures as appropriate and evaluate response

## 2024-12-01 NOTE — PLAN OF CARE
Problem: Discharge Planning  Goal: Discharge to home or other facility with appropriate resources  12/1/2024 1129 by Joi Perrin RN  Outcome: Progressing  Flowsheets (Taken 11/30/2024 2000 by Karine Gibson RN)  Discharge to home or other facility with appropriate resources:   Identify barriers to discharge with patient and caregiver   Arrange for needed discharge resources and transportation as appropriate   Identify discharge learning needs (meds, wound care, etc)  12/1/2024 0351 by Karine Gibson RN  Outcome: Progressing  Flowsheets (Taken 11/30/2024 2000)  Discharge to home or other facility with appropriate resources:   Identify barriers to discharge with patient and caregiver   Arrange for needed discharge resources and transportation as appropriate   Identify discharge learning needs (meds, wound care, etc)     Problem: Safety - Adult  Goal: Free from fall injury  12/1/2024 1129 by Joi Perrin RN  Outcome: Progressing  Flowsheets (Taken 12/1/2024 0351 by Karine Gibson RN)  Free From Fall Injury: Instruct family/caregiver on patient safety  12/1/2024 0351 by Kairne Gibson RN  Outcome: Progressing  Flowsheets (Taken 12/1/2024 0351)  Free From Fall Injury: Instruct family/caregiver on patient safety     Problem: Pain  Goal: Verbalizes/displays adequate comfort level or baseline comfort level  12/1/2024 1129 by Joi Perrin RN  Outcome: Progressing  Flowsheets (Taken 11/30/2024 2000 by Karine Gibson RN)  Verbalizes/displays adequate comfort level or baseline comfort level:   Encourage patient to monitor pain and request assistance   Assess pain using appropriate pain scale   Administer analgesics based on type and severity of pain and evaluate response   Implement non-pharmacological measures as appropriate and evaluate response  12/1/2024 0351 by Karine Gibson RN  Outcome: Progressing  Flowsheets (Taken 11/30/2024 2000)  Verbalizes/displays adequate comfort level or baseline comfort

## 2024-12-02 ENCOUNTER — APPOINTMENT (OUTPATIENT)
Dept: GENERAL RADIOLOGY | Age: 54
DRG: 190 | End: 2024-12-02
Payer: COMMERCIAL

## 2024-12-02 ENCOUNTER — APPOINTMENT (OUTPATIENT)
Dept: CT IMAGING | Age: 54
DRG: 190 | End: 2024-12-02
Payer: COMMERCIAL

## 2024-12-02 LAB
ABSOLUTE BANDS: 0.19 K/UL (ref 0–1)
ALBUMIN SERPL-MCNC: 4 G/DL (ref 3.5–5.2)
ALP SERPL-CCNC: 72 U/L (ref 35–104)
ALT SERPL-CCNC: 30 U/L (ref 10–35)
ANION GAP SERPL CALCULATED.3IONS-SCNC: 9 MMOL/L (ref 9–16)
AST SERPL-CCNC: 29 U/L (ref 10–35)
BANDS: 2 % (ref 0–10)
BASOPHILS # BLD: 0 K/UL (ref 0–0.2)
BASOPHILS NFR BLD: 0 % (ref 0–2)
BILIRUB SERPL-MCNC: <0.2 MG/DL (ref 0–1.2)
BNP SERPL-MCNC: 166 PG/ML (ref 0–300)
BUN SERPL-MCNC: 25 MG/DL (ref 6–20)
CALCIUM SERPL-MCNC: 9.2 MG/DL (ref 8.6–10.4)
CHLORIDE SERPL-SCNC: 103 MMOL/L (ref 98–107)
CO2 SERPL-SCNC: 23 MMOL/L (ref 20–31)
CREAT SERPL-MCNC: 0.8 MG/DL (ref 0.7–1.2)
EOSINOPHIL # BLD: 0 K/UL (ref 0–0.4)
EOSINOPHILS RELATIVE PERCENT: 0 % (ref 0–4)
ERYTHROCYTE [DISTWIDTH] IN BLOOD BY AUTOMATED COUNT: 15.2 % (ref 11.5–14.9)
GFR, ESTIMATED: 89 ML/MIN/1.73M2
GLUCOSE SERPL-MCNC: 123 MG/DL (ref 74–99)
HCT VFR BLD AUTO: 33.5 % (ref 36–46)
HGB BLD-MCNC: 11.3 G/DL (ref 12–16)
LYMPHOCYTES NFR BLD: 0.49 K/UL (ref 1–4.8)
LYMPHOCYTES RELATIVE PERCENT: 5 % (ref 24–44)
MCH RBC QN AUTO: 30.1 PG (ref 26–34)
MCHC RBC AUTO-ENTMCNC: 33.6 G/DL (ref 31–37)
MCV RBC AUTO: 89.4 FL (ref 80–100)
MONOCYTES NFR BLD: 0.58 K/UL (ref 0.1–1.3)
MONOCYTES NFR BLD: 6 % (ref 1–7)
MORPHOLOGY: ABNORMAL
NEUTROPHILS NFR BLD: 87 % (ref 36–66)
NEUTS SEG NFR BLD: 8.44 K/UL (ref 1.3–9.1)
PLATELET # BLD AUTO: 220 K/UL (ref 150–450)
PMV BLD AUTO: 6.9 FL (ref 6–12)
POTASSIUM SERPL-SCNC: 4.6 MMOL/L (ref 3.7–5.3)
PROCALCITONIN SERPL-MCNC: 0.05 NG/ML (ref 0–0.09)
PROT SERPL-MCNC: 6.4 G/DL (ref 6.6–8.7)
RBC # BLD AUTO: 3.75 M/UL (ref 4–5.2)
SODIUM SERPL-SCNC: 135 MMOL/L (ref 136–145)
WBC OTHER # BLD: 9.7 K/UL (ref 3.5–11)

## 2024-12-02 PROCEDURE — 6360000002 HC RX W HCPCS

## 2024-12-02 PROCEDURE — 84145 PROCALCITONIN (PCT): CPT

## 2024-12-02 PROCEDURE — 83880 ASSAY OF NATRIURETIC PEPTIDE: CPT

## 2024-12-02 PROCEDURE — 94640 AIRWAY INHALATION TREATMENT: CPT

## 2024-12-02 PROCEDURE — 6370000000 HC RX 637 (ALT 250 FOR IP): Performed by: NURSE PRACTITIONER

## 2024-12-02 PROCEDURE — 6370000000 HC RX 637 (ALT 250 FOR IP)

## 2024-12-02 PROCEDURE — 2580000003 HC RX 258

## 2024-12-02 PROCEDURE — 2700000000 HC OXYGEN THERAPY PER DAY

## 2024-12-02 PROCEDURE — 36415 COLL VENOUS BLD VENIPUNCTURE: CPT

## 2024-12-02 PROCEDURE — 71045 X-RAY EXAM CHEST 1 VIEW: CPT

## 2024-12-02 PROCEDURE — 80053 COMPREHEN METABOLIC PANEL: CPT

## 2024-12-02 PROCEDURE — 94761 N-INVAS EAR/PLS OXIMETRY MLT: CPT

## 2024-12-02 PROCEDURE — 85025 COMPLETE CBC W/AUTO DIFF WBC: CPT

## 2024-12-02 PROCEDURE — 99233 SBSQ HOSP IP/OBS HIGH 50: CPT | Performed by: INTERNAL MEDICINE

## 2024-12-02 PROCEDURE — 71250 CT THORAX DX C-: CPT

## 2024-12-02 PROCEDURE — 2580000003 HC RX 258: Performed by: NURSE PRACTITIONER

## 2024-12-02 PROCEDURE — 6360000002 HC RX W HCPCS: Performed by: NURSE PRACTITIONER

## 2024-12-02 PROCEDURE — 1200000000 HC SEMI PRIVATE

## 2024-12-02 RX ADMIN — Medication 60 MG: at 09:31

## 2024-12-02 RX ADMIN — LEVALBUTEROL HYDROCHLORIDE 1.25 MG: 1.25 SOLUTION RESPIRATORY (INHALATION) at 14:50

## 2024-12-02 RX ADMIN — QUETIAPINE FUMARATE 400 MG: 200 TABLET ORAL at 20:43

## 2024-12-02 RX ADMIN — SODIUM CHLORIDE, PRESERVATIVE FREE 10 ML: 5 INJECTION INTRAVENOUS at 14:35

## 2024-12-02 RX ADMIN — IPRATROPIUM BROMIDE 0.5 MG: 0.5 SOLUTION RESPIRATORY (INHALATION) at 07:05

## 2024-12-02 RX ADMIN — BENZONATATE 200 MG: 200 CAPSULE ORAL at 14:31

## 2024-12-02 RX ADMIN — SODIUM CHLORIDE, PRESERVATIVE FREE 10 ML: 5 INJECTION INTRAVENOUS at 09:38

## 2024-12-02 RX ADMIN — METHYLPREDNISOLONE SODIUM SUCCINATE 60 MG: 125 INJECTION, POWDER, LYOPHILIZED, FOR SOLUTION INTRAMUSCULAR; INTRAVENOUS at 20:43

## 2024-12-02 RX ADMIN — QUETIAPINE FUMARATE 200 MG: 200 TABLET ORAL at 09:29

## 2024-12-02 RX ADMIN — ACETAMINOPHEN 650 MG: 325 TABLET ORAL at 11:50

## 2024-12-02 RX ADMIN — METHYLPREDNISOLONE SODIUM SUCCINATE 60 MG: 125 INJECTION, POWDER, LYOPHILIZED, FOR SOLUTION INTRAMUSCULAR; INTRAVENOUS at 14:31

## 2024-12-02 RX ADMIN — ENOXAPARIN SODIUM 40 MG: 100 INJECTION SUBCUTANEOUS at 09:31

## 2024-12-02 RX ADMIN — Medication 2 PUFF: at 07:15

## 2024-12-02 RX ADMIN — SODIUM CHLORIDE, PRESERVATIVE FREE 10 ML: 5 INJECTION INTRAVENOUS at 20:45

## 2024-12-02 RX ADMIN — IPRATROPIUM BROMIDE 0.5 MG: 0.5 SOLUTION RESPIRATORY (INHALATION) at 10:55

## 2024-12-02 RX ADMIN — METHYLPREDNISOLONE SODIUM SUCCINATE 40 MG: 40 INJECTION INTRAMUSCULAR; INTRAVENOUS at 03:45

## 2024-12-02 RX ADMIN — BENZONATATE 200 MG: 200 CAPSULE ORAL at 20:43

## 2024-12-02 RX ADMIN — CLONAZEPAM 0.25 MG: 0.5 TABLET ORAL at 20:43

## 2024-12-02 RX ADMIN — LEVALBUTEROL HYDROCHLORIDE 1.25 MG: 1.25 SOLUTION RESPIRATORY (INHALATION) at 20:12

## 2024-12-02 RX ADMIN — METHYLPREDNISOLONE SODIUM SUCCINATE 40 MG: 40 INJECTION INTRAMUSCULAR; INTRAVENOUS at 09:31

## 2024-12-02 RX ADMIN — PRAVASTATIN SODIUM 20 MG: 20 TABLET ORAL at 17:55

## 2024-12-02 RX ADMIN — IPRATROPIUM BROMIDE 0.5 MG: 0.5 SOLUTION RESPIRATORY (INHALATION) at 20:12

## 2024-12-02 RX ADMIN — CLONAZEPAM 0.25 MG: 0.5 TABLET ORAL at 14:56

## 2024-12-02 RX ADMIN — GUAIFENESIN 1200 MG: 600 TABLET, EXTENDED RELEASE ORAL at 09:29

## 2024-12-02 RX ADMIN — Medication 2 PUFF: at 20:12

## 2024-12-02 RX ADMIN — LEVALBUTEROL HYDROCHLORIDE 1.25 MG: 1.25 SOLUTION RESPIRATORY (INHALATION) at 10:55

## 2024-12-02 RX ADMIN — LEVALBUTEROL HYDROCHLORIDE 1.25 MG: 1.25 SOLUTION RESPIRATORY (INHALATION) at 07:05

## 2024-12-02 RX ADMIN — TRAZODONE HYDROCHLORIDE 150 MG: 100 TABLET ORAL at 20:43

## 2024-12-02 RX ADMIN — QUETIAPINE FUMARATE 200 MG: 200 TABLET ORAL at 14:31

## 2024-12-02 RX ADMIN — POLYETHYLENE GLYCOL 3350 17 G: 17 POWDER, FOR SOLUTION ORAL at 21:27

## 2024-12-02 RX ADMIN — GUAIFENESIN 1200 MG: 600 TABLET, EXTENDED RELEASE ORAL at 20:43

## 2024-12-02 RX ADMIN — CLONAZEPAM 0.25 MG: 0.5 TABLET ORAL at 09:29

## 2024-12-02 RX ADMIN — IPRATROPIUM BROMIDE 0.5 MG: 0.5 SOLUTION RESPIRATORY (INHALATION) at 14:50

## 2024-12-02 RX ADMIN — MIRTAZAPINE 15 MG: 15 TABLET, ORALLY DISINTEGRATING ORAL at 20:49

## 2024-12-02 RX ADMIN — Medication 60 MG: at 20:42

## 2024-12-02 RX ADMIN — ESCITALOPRAM OXALATE 20 MG: 20 TABLET ORAL at 20:43

## 2024-12-02 RX ADMIN — BENZONATATE 200 MG: 200 CAPSULE ORAL at 09:29

## 2024-12-02 ASSESSMENT — PAIN SCALES - GENERAL
PAINLEVEL_OUTOF10: 5
PAINLEVEL_OUTOF10: 0

## 2024-12-02 ASSESSMENT — ENCOUNTER SYMPTOMS
RHINORRHEA: 0
SHORTNESS OF BREATH: 1
DIARRHEA: 0
ABDOMINAL PAIN: 0
COUGH: 1
CONSTIPATION: 1
WHEEZING: 1
SORE THROAT: 0

## 2024-12-02 ASSESSMENT — PAIN DESCRIPTION - ORIENTATION: ORIENTATION: POSTERIOR

## 2024-12-02 ASSESSMENT — PAIN DESCRIPTION - LOCATION: LOCATION: BACK;HEAD

## 2024-12-02 ASSESSMENT — PAIN DESCRIPTION - DESCRIPTORS: DESCRIPTORS: ACHING

## 2024-12-02 NOTE — PLAN OF CARE
Problem: Discharge Planning  Goal: Discharge to home or other facility with appropriate resources  Outcome: Progressing  Flowsheets (Taken 12/1/2024 2123)  Discharge to home or other facility with appropriate resources: Identify barriers to discharge with patient and caregiver     Problem: Safety - Adult  Goal: Free from fall injury  Outcome: Progressing  Flowsheets (Taken 12/2/2024 0338)  Free From Fall Injury: Based on caregiver fall risk screen, instruct family/caregiver to ask for assistance with transferring infant if caregiver noted to have fall risk factors     Problem: Pain  Goal: Verbalizes/displays adequate comfort level or baseline comfort level  Outcome: Progressing     Problem: Respiratory - Adult  Goal: Achieves optimal ventilation and oxygenation  Outcome: Progressing  Flowsheets (Taken 12/1/2024 2123)  Achieves optimal ventilation and oxygenation:   Assess for changes in respiratory status   Assess for changes in mentation and behavior     Problem: Gastrointestinal - Adult  Goal: Maintains or returns to baseline bowel function  Outcome: Progressing  Flowsheets (Taken 12/1/2024 2123)  Maintains or returns to baseline bowel function: Assess bowel function     Problem: Infection - Adult  Goal: Absence of infection at discharge  Outcome: Progressing  Flowsheets (Taken 12/1/2024 2123)  Absence of infection at discharge:   Assess and monitor for signs and symptoms of infection   Monitor lab/diagnostic results

## 2024-12-02 NOTE — CARE COORDINATION
ONGOING DISCHARGE PLAN:    Patient is alert and oriented x4.    Spoke with patient regarding discharge plan and patient confirms that plan is still to return to home w/ Roommate.     Denies VNS.    + Rhinovirus.     Pt. Currently sating 94% on 2LNC. Pt. Does NOT wear at home. Will follow.     CXR today. Pulm following. Per previous notes, may need Bronch if no improvement.     Will continue to follow for additional discharge needs.    If patient is discharged prior to next notation, then this note serves as note for discharge by case management.    Electronically signed by Dana Gasca RN on 12/2/2024 at 10:25 AM

## 2024-12-02 NOTE — PLAN OF CARE
Problem: Discharge Planning  Goal: Discharge to home or other facility with appropriate resources  12/2/2024 1718 by Lissa Whatley RN  Outcome: Progressing  Flowsheets (Taken 12/2/2024 0726)  Discharge to home or other facility with appropriate resources:   Identify barriers to discharge with patient and caregiver   Arrange for needed discharge resources and transportation as appropriate   Identify discharge learning needs (meds, wound care, etc)   Refer to discharge planning if patient needs post-hospital services based on physician order or complex needs related to functional status, cognitive ability or social support system     Problem: Safety - Adult  Goal: Free from fall injury  12/2/2024 1718 by Lissa Whatley RN  Outcome: Progressing  Flowsheets (Taken 12/2/2024 1436)  Free From Fall Injury: Instruct family/caregiver on patient safety     Problem: Pain  Goal: Verbalizes/displays adequate comfort level or baseline comfort level  12/2/2024 1718 by Lissa Whatlye RN  Outcome: Progressing     Problem: Respiratory - Adult  Goal: Achieves optimal ventilation and oxygenation  12/2/2024 1718 by Lissa Whatlye RN  Outcome: Progressing  Flowsheets (Taken 12/2/2024 0726)  Achieves optimal ventilation and oxygenation:   Assess for changes in respiratory status   Assess for changes in mentation and behavior   Position to facilitate oxygenation and minimize respiratory effort     Problem: Gastrointestinal - Adult  Goal: Maintains or returns to baseline bowel function  12/2/2024 1718 by Lissa Whatley RN  Outcome: Progressing  Flowsheets (Taken 12/2/2024 0726)  Maintains or returns to baseline bowel function: Assess bowel function     Problem: Infection - Adult  Goal: Absence of infection at discharge  12/2/2024 1718 by Lissa Whatley RN  Outcome: Progressing  Flowsheets (Taken 12/2/2024 0726)  Absence of infection at discharge:   Assess and monitor for signs and symptoms of infection   Monitor lab/diagnostic results

## 2024-12-03 LAB
ALBUMIN SERPL-MCNC: 3.9 G/DL (ref 3.5–5.2)
ALP SERPL-CCNC: 69 U/L (ref 35–104)
ALT SERPL-CCNC: 31 U/L (ref 10–35)
ANION GAP SERPL CALCULATED.3IONS-SCNC: 9 MMOL/L (ref 9–16)
AST SERPL-CCNC: 24 U/L (ref 10–35)
BASOPHILS # BLD: 0 K/UL (ref 0–0.2)
BASOPHILS NFR BLD: 0 % (ref 0–2)
BILIRUB SERPL-MCNC: <0.2 MG/DL (ref 0–1.2)
BUN SERPL-MCNC: 28 MG/DL (ref 6–20)
CALCIUM SERPL-MCNC: 8.8 MG/DL (ref 8.6–10.4)
CHLORIDE SERPL-SCNC: 105 MMOL/L (ref 98–107)
CO2 SERPL-SCNC: 23 MMOL/L (ref 20–31)
CREAT SERPL-MCNC: 1 MG/DL (ref 0.7–1.2)
EOSINOPHIL # BLD: 0 K/UL (ref 0–0.4)
EOSINOPHILS RELATIVE PERCENT: 0 % (ref 0–4)
ERYTHROCYTE [DISTWIDTH] IN BLOOD BY AUTOMATED COUNT: 15.3 % (ref 11.5–14.9)
GFR, ESTIMATED: 68 ML/MIN/1.73M2
GLUCOSE SERPL-MCNC: 124 MG/DL (ref 74–99)
HCT VFR BLD AUTO: 34.5 % (ref 36–46)
HGB BLD-MCNC: 11.5 G/DL (ref 12–16)
LYMPHOCYTES NFR BLD: 0.42 K/UL (ref 1–4.8)
LYMPHOCYTES RELATIVE PERCENT: 4 % (ref 24–44)
MCH RBC QN AUTO: 30.1 PG (ref 26–34)
MCHC RBC AUTO-ENTMCNC: 33.5 G/DL (ref 31–37)
MCV RBC AUTO: 89.9 FL (ref 80–100)
METAMYELOCYTES ABSOLUTE COUNT: 0.11 K/UL
METAMYELOCYTES: 1 %
MONOCYTES NFR BLD: 0.53 K/UL (ref 0.1–1.3)
MONOCYTES NFR BLD: 5 % (ref 1–7)
MORPHOLOGY: ABNORMAL
MYELOCYTES ABSOLUTE COUNT: 0.21 K/UL
MYELOCYTES: 2 %
NEUTROPHILS NFR BLD: 88 % (ref 36–66)
NEUTS SEG NFR BLD: 9.23 K/UL (ref 1.3–9.1)
PLATELET # BLD AUTO: 208 K/UL (ref 150–450)
PMV BLD AUTO: 6.3 FL (ref 6–12)
POTASSIUM SERPL-SCNC: 4.5 MMOL/L (ref 3.7–5.3)
PROT SERPL-MCNC: 6.4 G/DL (ref 6.6–8.7)
RBC # BLD AUTO: 3.84 M/UL (ref 4–5.2)
SODIUM SERPL-SCNC: 137 MMOL/L (ref 136–145)
WBC OTHER # BLD: 10.5 K/UL (ref 3.5–11)

## 2024-12-03 PROCEDURE — 85025 COMPLETE CBC W/AUTO DIFF WBC: CPT

## 2024-12-03 PROCEDURE — 2700000000 HC OXYGEN THERAPY PER DAY

## 2024-12-03 PROCEDURE — 99233 SBSQ HOSP IP/OBS HIGH 50: CPT

## 2024-12-03 PROCEDURE — 6360000002 HC RX W HCPCS: Performed by: NURSE PRACTITIONER

## 2024-12-03 PROCEDURE — 6370000000 HC RX 637 (ALT 250 FOR IP)

## 2024-12-03 PROCEDURE — 6370000000 HC RX 637 (ALT 250 FOR IP): Performed by: NURSE PRACTITIONER

## 2024-12-03 PROCEDURE — 1200000000 HC SEMI PRIVATE

## 2024-12-03 PROCEDURE — 36415 COLL VENOUS BLD VENIPUNCTURE: CPT

## 2024-12-03 PROCEDURE — 2580000003 HC RX 258: Performed by: NURSE PRACTITIONER

## 2024-12-03 PROCEDURE — 2580000003 HC RX 258

## 2024-12-03 PROCEDURE — 6360000002 HC RX W HCPCS

## 2024-12-03 PROCEDURE — 94640 AIRWAY INHALATION TREATMENT: CPT

## 2024-12-03 PROCEDURE — 80053 COMPREHEN METABOLIC PANEL: CPT

## 2024-12-03 PROCEDURE — 94761 N-INVAS EAR/PLS OXIMETRY MLT: CPT

## 2024-12-03 RX ADMIN — METHYLPREDNISOLONE SODIUM SUCCINATE 60 MG: 125 INJECTION, POWDER, LYOPHILIZED, FOR SOLUTION INTRAMUSCULAR; INTRAVENOUS at 08:44

## 2024-12-03 RX ADMIN — TRAZODONE HYDROCHLORIDE 150 MG: 100 TABLET ORAL at 20:20

## 2024-12-03 RX ADMIN — METHYLPREDNISOLONE SODIUM SUCCINATE 60 MG: 125 INJECTION, POWDER, LYOPHILIZED, FOR SOLUTION INTRAMUSCULAR; INTRAVENOUS at 20:20

## 2024-12-03 RX ADMIN — LEVALBUTEROL HYDROCHLORIDE 1.25 MG: 1.25 SOLUTION RESPIRATORY (INHALATION) at 19:40

## 2024-12-03 RX ADMIN — PRAVASTATIN SODIUM 20 MG: 20 TABLET ORAL at 18:42

## 2024-12-03 RX ADMIN — ACETAMINOPHEN 650 MG: 325 TABLET ORAL at 08:40

## 2024-12-03 RX ADMIN — LEVALBUTEROL HYDROCHLORIDE 1.25 MG: 1.25 SOLUTION RESPIRATORY (INHALATION) at 15:39

## 2024-12-03 RX ADMIN — ENOXAPARIN SODIUM 40 MG: 100 INJECTION SUBCUTANEOUS at 08:29

## 2024-12-03 RX ADMIN — MIRTAZAPINE 15 MG: 15 TABLET, ORALLY DISINTEGRATING ORAL at 20:22

## 2024-12-03 RX ADMIN — QUETIAPINE FUMARATE 200 MG: 200 TABLET ORAL at 13:33

## 2024-12-03 RX ADMIN — METHYLPREDNISOLONE SODIUM SUCCINATE 60 MG: 125 INJECTION, POWDER, LYOPHILIZED, FOR SOLUTION INTRAMUSCULAR; INTRAVENOUS at 01:44

## 2024-12-03 RX ADMIN — BENZONATATE 200 MG: 200 CAPSULE ORAL at 08:29

## 2024-12-03 RX ADMIN — CLONAZEPAM 0.25 MG: 0.5 TABLET ORAL at 08:40

## 2024-12-03 RX ADMIN — Medication 2 PUFF: at 07:31

## 2024-12-03 RX ADMIN — QUETIAPINE FUMARATE 400 MG: 200 TABLET ORAL at 20:20

## 2024-12-03 RX ADMIN — LEVALBUTEROL HYDROCHLORIDE 1.25 MG: 1.25 SOLUTION RESPIRATORY (INHALATION) at 07:31

## 2024-12-03 RX ADMIN — BENZONATATE 200 MG: 200 CAPSULE ORAL at 14:52

## 2024-12-03 RX ADMIN — METHYLPREDNISOLONE SODIUM SUCCINATE 60 MG: 125 INJECTION, POWDER, LYOPHILIZED, FOR SOLUTION INTRAMUSCULAR; INTRAVENOUS at 13:33

## 2024-12-03 RX ADMIN — SODIUM CHLORIDE, PRESERVATIVE FREE 10 ML: 5 INJECTION INTRAVENOUS at 20:26

## 2024-12-03 RX ADMIN — CLONAZEPAM 0.25 MG: 0.5 TABLET ORAL at 20:20

## 2024-12-03 RX ADMIN — Medication 2 PUFF: at 19:45

## 2024-12-03 RX ADMIN — IPRATROPIUM BROMIDE 0.5 MG: 0.5 SOLUTION RESPIRATORY (INHALATION) at 15:39

## 2024-12-03 RX ADMIN — Medication 60 MG: at 20:20

## 2024-12-03 RX ADMIN — POLYETHYLENE GLYCOL 3350 17 G: 17 POWDER, FOR SOLUTION ORAL at 23:13

## 2024-12-03 RX ADMIN — Medication 60 MG: at 08:30

## 2024-12-03 RX ADMIN — ESCITALOPRAM OXALATE 20 MG: 20 TABLET ORAL at 20:20

## 2024-12-03 RX ADMIN — LEVALBUTEROL HYDROCHLORIDE 1.25 MG: 1.25 SOLUTION RESPIRATORY (INHALATION) at 11:28

## 2024-12-03 RX ADMIN — GUAIFENESIN 1200 MG: 600 TABLET, EXTENDED RELEASE ORAL at 08:29

## 2024-12-03 RX ADMIN — SODIUM CHLORIDE, PRESERVATIVE FREE 10 ML: 5 INJECTION INTRAVENOUS at 13:37

## 2024-12-03 RX ADMIN — BENZONATATE 200 MG: 200 CAPSULE ORAL at 20:20

## 2024-12-03 RX ADMIN — CLONAZEPAM 0.25 MG: 0.5 TABLET ORAL at 14:52

## 2024-12-03 RX ADMIN — IPRATROPIUM BROMIDE 0.5 MG: 0.5 SOLUTION RESPIRATORY (INHALATION) at 07:31

## 2024-12-03 RX ADMIN — IPRATROPIUM BROMIDE 0.5 MG: 0.5 SOLUTION RESPIRATORY (INHALATION) at 19:40

## 2024-12-03 RX ADMIN — IPRATROPIUM BROMIDE 0.5 MG: 0.5 SOLUTION RESPIRATORY (INHALATION) at 11:28

## 2024-12-03 RX ADMIN — QUETIAPINE FUMARATE 200 MG: 200 TABLET ORAL at 08:29

## 2024-12-03 RX ADMIN — SODIUM CHLORIDE, PRESERVATIVE FREE 10 ML: 5 INJECTION INTRAVENOUS at 08:34

## 2024-12-03 RX ADMIN — GUAIFENESIN 1200 MG: 600 TABLET, EXTENDED RELEASE ORAL at 20:20

## 2024-12-03 ASSESSMENT — PAIN SCALES - GENERAL
PAINLEVEL_OUTOF10: 4
PAINLEVEL_OUTOF10: 0

## 2024-12-03 NOTE — CARE COORDINATION
ONGOING DISCHARGE PLAN:    Patient is alert and oriented x4.    Spoke with patient regarding discharge plan and patient confirms that plan is still to return to home w/ Roommate.    Denies VNS     + Rhinovirus.     Pt. Remains on IV Steroids, 60 MG, Q6. Per Pulm notes, will continue for 1 more day. No Plans for Bronch.     Pt. Is Currently sating 97% on 2LNC. Pt. Does NOT wear at home. Home O2, Eval, Prior to DC. Will follow if needed.     CT chest today.    Plans per Pulm.      Will continue to follow for additional discharge needs.    If patient is discharged prior to next notation, then this note serves as note for discharge by case management.    Electronically signed by Dana Gasca RN on 12/3/2024 at 1:16 PM

## 2024-12-03 NOTE — PLAN OF CARE
Problem: Discharge Planning  Goal: Discharge to home or other facility with appropriate resources  12/2/2024 2341 by Carline Ferrell RN  Outcome: Progressing     Problem: Safety - Adult  Goal: Free from fall injury  12/2/2024 2341 by Carline Ferrell RN  Outcome: Progressing  Flowsheets (Taken 12/2/2024 2340)  Free From Fall Injury: Instruct family/caregiver on patient safety     Problem: Pain  Goal: Verbalizes/displays adequate comfort level or baseline comfort level  12/2/2024 2341 by Carline Ferrell RN  Outcome: Progressing     Problem: Respiratory - Adult  Goal: Achieves optimal ventilation and oxygenation  12/2/2024 2341 by Carline Ferrell RN  Outcome: Progressing     Problem: Gastrointestinal - Adult  Goal: Maintains or returns to baseline bowel function  12/2/2024 2341 by Carline Ferrell RN  Outcome: Progressing     Problem: Infection - Adult  Goal: Absence of infection at discharge  12/2/2024 2341 by Carline Ferrell RN  Outcome: Progressing     Problem: ABCDS Injury Assessment  Goal: Absence of physical injury  12/2/2024 2341 by Carline Ferrell RN  Outcome: Progressing  Flowsheets (Taken 12/2/2024 2340)  Absence of Physical Injury: Implement safety measures based on patient assessment

## 2024-12-03 NOTE — PLAN OF CARE
Problem: Discharge Planning  Goal: Discharge to home or other facility with appropriate resources  Outcome: Progressing  Flowsheets (Taken 12/3/2024 0824)  Discharge to home or other facility with appropriate resources:   Identify barriers to discharge with patient and caregiver   Arrange for needed discharge resources and transportation as appropriate   Identify discharge learning needs (meds, wound care, etc)   Refer to discharge planning if patient needs post-hospital services based on physician order or complex needs related to functional status, cognitive ability or social support system     Problem: Safety - Adult  Goal: Free from fall injury  Outcome: Progressing     Problem: Pain  Goal: Verbalizes/displays adequate comfort level or baseline comfort level  Outcome: Progressing     Problem: Respiratory - Adult  Goal: Achieves optimal ventilation and oxygenation  Outcome: Progressing  Flowsheets (Taken 12/3/2024 0824)  Achieves optimal ventilation and oxygenation:   Assess for changes in respiratory status   Assess for changes in mentation and behavior   Position to facilitate oxygenation and minimize respiratory effort     Problem: Gastrointestinal - Adult  Goal: Maintains or returns to baseline bowel function  Outcome: Progressing  Flowsheets (Taken 12/3/2024 0824)  Maintains or returns to baseline bowel function: Assess bowel function     Problem: Infection - Adult  Goal: Absence of infection at discharge  Outcome: Progressing  Flowsheets (Taken 12/3/2024 0824)  Absence of infection at discharge:   Assess and monitor for signs and symptoms of infection   Monitor lab/diagnostic results   Administer medications as ordered     Problem: ABCDS Injury Assessment  Goal: Absence of physical injury  Outcome: Progressing

## 2024-12-04 LAB
ALBUMIN SERPL-MCNC: 3.9 G/DL (ref 3.5–5.2)
ALP SERPL-CCNC: 69 U/L (ref 35–104)
ALT SERPL-CCNC: 53 U/L (ref 10–35)
ANION GAP SERPL CALCULATED.3IONS-SCNC: 11 MMOL/L (ref 9–16)
AST SERPL-CCNC: 33 U/L (ref 10–35)
BASOPHILS # BLD: 0 K/UL (ref 0–0.2)
BASOPHILS NFR BLD: 0 % (ref 0–2)
BILIRUB SERPL-MCNC: <0.2 MG/DL (ref 0–1.2)
BUN SERPL-MCNC: 24 MG/DL (ref 6–20)
CALCIUM SERPL-MCNC: 9.1 MG/DL (ref 8.6–10.4)
CHLORIDE SERPL-SCNC: 103 MMOL/L (ref 98–107)
CO2 SERPL-SCNC: 22 MMOL/L (ref 20–31)
CREAT SERPL-MCNC: 0.8 MG/DL (ref 0.7–1.2)
EOSINOPHIL # BLD: 0 K/UL (ref 0–0.4)
EOSINOPHILS RELATIVE PERCENT: 0 % (ref 0–4)
ERYTHROCYTE [DISTWIDTH] IN BLOOD BY AUTOMATED COUNT: 15 % (ref 11.5–14.9)
GFR, ESTIMATED: 89 ML/MIN/1.73M2
GLUCOSE SERPL-MCNC: 132 MG/DL (ref 74–99)
HCT VFR BLD AUTO: 36 % (ref 36–46)
HGB BLD-MCNC: 11.8 G/DL (ref 12–16)
LYMPHOCYTES NFR BLD: 0.67 K/UL (ref 1–4.8)
LYMPHOCYTES RELATIVE PERCENT: 6 % (ref 24–44)
MCH RBC QN AUTO: 29.3 PG (ref 26–34)
MCHC RBC AUTO-ENTMCNC: 32.7 G/DL (ref 31–37)
MCV RBC AUTO: 89.8 FL (ref 80–100)
MONOCYTES NFR BLD: 0.34 K/UL (ref 0.1–1.3)
MONOCYTES NFR BLD: 3 % (ref 1–7)
MORPHOLOGY: ABNORMAL
NEUTROPHILS NFR BLD: 91 % (ref 36–66)
NEUTS SEG NFR BLD: 10.19 K/UL (ref 1.3–9.1)
PLATELET # BLD AUTO: 232 K/UL (ref 150–450)
PMV BLD AUTO: 6.4 FL (ref 6–12)
POTASSIUM SERPL-SCNC: 4.6 MMOL/L (ref 3.7–5.3)
PROT SERPL-MCNC: 6.5 G/DL (ref 6.6–8.7)
RBC # BLD AUTO: 4.01 M/UL (ref 4–5.2)
SODIUM SERPL-SCNC: 136 MMOL/L (ref 136–145)
WBC OTHER # BLD: 11.2 K/UL (ref 3.5–11)

## 2024-12-04 PROCEDURE — 94664 DEMO&/EVAL PT USE INHALER: CPT

## 2024-12-04 PROCEDURE — 94761 N-INVAS EAR/PLS OXIMETRY MLT: CPT

## 2024-12-04 PROCEDURE — 6370000000 HC RX 637 (ALT 250 FOR IP): Performed by: NURSE PRACTITIONER

## 2024-12-04 PROCEDURE — 94762 N-INVAS EAR/PLS OXIMTRY CONT: CPT

## 2024-12-04 PROCEDURE — 2700000000 HC OXYGEN THERAPY PER DAY

## 2024-12-04 PROCEDURE — 6360000002 HC RX W HCPCS: Performed by: NURSE PRACTITIONER

## 2024-12-04 PROCEDURE — 36415 COLL VENOUS BLD VENIPUNCTURE: CPT

## 2024-12-04 PROCEDURE — 2580000003 HC RX 258

## 2024-12-04 PROCEDURE — 6370000000 HC RX 637 (ALT 250 FOR IP)

## 2024-12-04 PROCEDURE — 2580000003 HC RX 258: Performed by: NURSE PRACTITIONER

## 2024-12-04 PROCEDURE — 94640 AIRWAY INHALATION TREATMENT: CPT

## 2024-12-04 PROCEDURE — 85025 COMPLETE CBC W/AUTO DIFF WBC: CPT

## 2024-12-04 PROCEDURE — 1200000000 HC SEMI PRIVATE

## 2024-12-04 PROCEDURE — 80053 COMPREHEN METABOLIC PANEL: CPT

## 2024-12-04 PROCEDURE — 99232 SBSQ HOSP IP/OBS MODERATE 35: CPT

## 2024-12-04 PROCEDURE — 6360000002 HC RX W HCPCS

## 2024-12-04 PROCEDURE — 97116 GAIT TRAINING THERAPY: CPT

## 2024-12-04 RX ADMIN — IPRATROPIUM BROMIDE 0.5 MG: 0.5 SOLUTION RESPIRATORY (INHALATION) at 18:04

## 2024-12-04 RX ADMIN — IPRATROPIUM BROMIDE 0.5 MG: 0.5 SOLUTION RESPIRATORY (INHALATION) at 14:43

## 2024-12-04 RX ADMIN — PRAVASTATIN SODIUM 20 MG: 20 TABLET ORAL at 17:58

## 2024-12-04 RX ADMIN — LEVALBUTEROL HYDROCHLORIDE 1.25 MG: 1.25 SOLUTION RESPIRATORY (INHALATION) at 18:04

## 2024-12-04 RX ADMIN — CLONAZEPAM 0.25 MG: 0.5 TABLET ORAL at 09:42

## 2024-12-04 RX ADMIN — LEVALBUTEROL HYDROCHLORIDE 1.25 MG: 1.25 SOLUTION RESPIRATORY (INHALATION) at 07:32

## 2024-12-04 RX ADMIN — ESCITALOPRAM OXALATE 20 MG: 20 TABLET ORAL at 22:00

## 2024-12-04 RX ADMIN — LEVALBUTEROL HYDROCHLORIDE 1.25 MG: 1.25 SOLUTION RESPIRATORY (INHALATION) at 11:16

## 2024-12-04 RX ADMIN — MIRTAZAPINE 15 MG: 15 TABLET, ORALLY DISINTEGRATING ORAL at 22:02

## 2024-12-04 RX ADMIN — Medication 2 PUFF: at 18:04

## 2024-12-04 RX ADMIN — Medication 60 MG: at 21:59

## 2024-12-04 RX ADMIN — Medication 2 PUFF: at 07:34

## 2024-12-04 RX ADMIN — IPRATROPIUM BROMIDE 0.5 MG: 0.5 SOLUTION RESPIRATORY (INHALATION) at 07:32

## 2024-12-04 RX ADMIN — IPRATROPIUM BROMIDE 0.5 MG: 0.5 SOLUTION RESPIRATORY (INHALATION) at 11:16

## 2024-12-04 RX ADMIN — QUETIAPINE FUMARATE 200 MG: 200 TABLET ORAL at 09:42

## 2024-12-04 RX ADMIN — SODIUM CHLORIDE, PRESERVATIVE FREE 10 ML: 5 INJECTION INTRAVENOUS at 21:59

## 2024-12-04 RX ADMIN — CLONAZEPAM 0.25 MG: 0.5 TABLET ORAL at 15:45

## 2024-12-04 RX ADMIN — QUETIAPINE FUMARATE 400 MG: 200 TABLET ORAL at 21:59

## 2024-12-04 RX ADMIN — GUAIFENESIN 1200 MG: 600 TABLET, EXTENDED RELEASE ORAL at 21:59

## 2024-12-04 RX ADMIN — BENZONATATE 200 MG: 200 CAPSULE ORAL at 15:44

## 2024-12-04 RX ADMIN — CLONAZEPAM 0.25 MG: 0.5 TABLET ORAL at 22:00

## 2024-12-04 RX ADMIN — METHYLPREDNISOLONE SODIUM SUCCINATE 60 MG: 125 INJECTION, POWDER, LYOPHILIZED, FOR SOLUTION INTRAMUSCULAR; INTRAVENOUS at 02:20

## 2024-12-04 RX ADMIN — WATER 40 MG: 1 INJECTION INTRAMUSCULAR; INTRAVENOUS; SUBCUTANEOUS at 17:58

## 2024-12-04 RX ADMIN — SODIUM CHLORIDE, PRESERVATIVE FREE 10 ML: 5 INJECTION INTRAVENOUS at 09:44

## 2024-12-04 RX ADMIN — GUAIFENESIN 1200 MG: 600 TABLET, EXTENDED RELEASE ORAL at 09:44

## 2024-12-04 RX ADMIN — ENOXAPARIN SODIUM 40 MG: 100 INJECTION SUBCUTANEOUS at 09:43

## 2024-12-04 RX ADMIN — TRAZODONE HYDROCHLORIDE 150 MG: 100 TABLET ORAL at 21:59

## 2024-12-04 RX ADMIN — LEVALBUTEROL HYDROCHLORIDE 1.25 MG: 1.25 SOLUTION RESPIRATORY (INHALATION) at 14:43

## 2024-12-04 RX ADMIN — Medication 60 MG: at 09:43

## 2024-12-04 RX ADMIN — BENZONATATE 200 MG: 200 CAPSULE ORAL at 21:59

## 2024-12-04 RX ADMIN — METHYLPREDNISOLONE SODIUM SUCCINATE 60 MG: 125 INJECTION, POWDER, LYOPHILIZED, FOR SOLUTION INTRAMUSCULAR; INTRAVENOUS at 09:42

## 2024-12-04 RX ADMIN — POLYETHYLENE GLYCOL 3350 17 G: 17 POWDER, FOR SOLUTION ORAL at 21:59

## 2024-12-04 RX ADMIN — BENZONATATE 200 MG: 200 CAPSULE ORAL at 09:43

## 2024-12-04 RX ADMIN — QUETIAPINE FUMARATE 200 MG: 200 TABLET ORAL at 15:44

## 2024-12-04 ASSESSMENT — PAIN SCALES - GENERAL: PAINLEVEL_OUTOF10: 0

## 2024-12-04 NOTE — CARE COORDINATION
ONGOING DISCHARGE PLAN:    Patient is alert and oriented x4.    Spoke with patient regarding discharge plan and patient confirms that plan is still home no needs.    +Rhinovirus, Solu-Medrol 40 q 8. Home O2 eval.     Will continue to follow for additional discharge needs.    If patient is discharged prior to next notation, then this note serves as note for discharge by case management.    Electronically signed by Nelli Hale RN on 12/4/2024 at 3:04 PM

## 2024-12-04 NOTE — PLAN OF CARE
Problem: Discharge Planning  Goal: Discharge to home or other facility with appropriate resources  Outcome: Progressing     Problem: Safety - Adult  Goal: Free from fall injury  Outcome: Progressing     Problem: Pain  Goal: Verbalizes/displays adequate comfort level or baseline comfort level  Outcome: Progressing     Problem: Respiratory - Adult  Goal: Achieves optimal ventilation and oxygenation  Outcome: Progressing     Problem: Gastrointestinal - Adult  Goal: Maintains or returns to baseline bowel function  Outcome: Progressing     Problem: Infection - Adult  Goal: Absence of infection at discharge  Outcome: Progressing     Problem: ABCDS Injury Assessment  Goal: Absence of physical injury  Outcome: Progressing

## 2024-12-04 NOTE — PLAN OF CARE
Problem: Discharge Planning  Goal: Discharge to home or other facility with appropriate resources  12/4/2024 0054 by Carline Ferrell RN  Outcome: Progressing     Problem: Safety - Adult  Goal: Free from fall injury  12/4/2024 0054 by Carline Ferrell RN  Outcome: Progressing  Flowsheets (Taken 12/4/2024 0053)  Free From Fall Injury: Instruct family/caregiver on patient safety     Problem: Pain  Goal: Verbalizes/displays adequate comfort level or baseline comfort level  12/4/2024 0054 by Carline Ferrell RN  Outcome: Progressing     Problem: Respiratory - Adult  Goal: Achieves optimal ventilation and oxygenation  12/4/2024 0054 by Carline Ferrell RN  Outcome: Progressing     Problem: Gastrointestinal - Adult  Goal: Maintains or returns to baseline bowel function  12/4/2024 0054 by Carline Ferrell RN  Outcome: Progressing     Problem: Infection - Adult  Goal: Absence of infection at discharge  12/4/2024 0054 by Carline Ferrell RN  Outcome: Progressing     Problem: ABCDS Injury Assessment  Goal: Absence of physical injury  12/4/2024 0054 by Carline Ferrell RN  Outcome: Progressing  Flowsheets (Taken 12/4/2024 0053)  Absence of Physical Injury: Implement safety measures based on patient assessment

## 2024-12-05 VITALS
HEIGHT: 67 IN | DIASTOLIC BLOOD PRESSURE: 82 MMHG | OXYGEN SATURATION: 98 % | TEMPERATURE: 98.2 F | SYSTOLIC BLOOD PRESSURE: 128 MMHG | WEIGHT: 130 LBS | BODY MASS INDEX: 20.4 KG/M2 | HEART RATE: 83 BPM | RESPIRATION RATE: 16 BRPM

## 2024-12-05 LAB
ABSOLUTE BANDS: 0.14 K/UL (ref 0–1)
ALBUMIN SERPL-MCNC: 4 G/DL (ref 3.5–5.2)
ALP SERPL-CCNC: 65 U/L (ref 35–104)
ALT SERPL-CCNC: 56 U/L (ref 10–35)
ANION GAP SERPL CALCULATED.3IONS-SCNC: 10 MMOL/L (ref 9–16)
AST SERPL-CCNC: 29 U/L (ref 10–35)
BANDS: 1 % (ref 0–10)
BASOPHILS # BLD: 0 K/UL (ref 0–0.2)
BASOPHILS NFR BLD: 0 % (ref 0–2)
BILIRUB SERPL-MCNC: <0.2 MG/DL (ref 0–1.2)
BUN SERPL-MCNC: 30 MG/DL (ref 6–20)
CALCIUM SERPL-MCNC: 9 MG/DL (ref 8.6–10.4)
CHLORIDE SERPL-SCNC: 105 MMOL/L (ref 98–107)
CO2 SERPL-SCNC: 22 MMOL/L (ref 20–31)
CREAT SERPL-MCNC: 0.9 MG/DL (ref 0.7–1.2)
EOSINOPHIL # BLD: 0 K/UL (ref 0–0.4)
EOSINOPHILS RELATIVE PERCENT: 0 % (ref 0–4)
ERYTHROCYTE [DISTWIDTH] IN BLOOD BY AUTOMATED COUNT: 15.4 % (ref 11.5–14.9)
GFR, ESTIMATED: 77 ML/MIN/1.73M2
GLUCOSE SERPL-MCNC: 128 MG/DL (ref 74–99)
HCT VFR BLD AUTO: 35.6 % (ref 36–46)
HGB BLD-MCNC: 11.7 G/DL (ref 12–16)
LYMPHOCYTES NFR BLD: 1.09 K/UL (ref 1–4.8)
LYMPHOCYTES RELATIVE PERCENT: 8 % (ref 24–44)
MCH RBC QN AUTO: 29.8 PG (ref 26–34)
MCHC RBC AUTO-ENTMCNC: 32.8 G/DL (ref 31–37)
MCV RBC AUTO: 90.9 FL (ref 80–100)
METAMYELOCYTES ABSOLUTE COUNT: 0.41 K/UL
METAMYELOCYTES: 3 %
MONOCYTES NFR BLD: 0.82 K/UL (ref 0.1–1.3)
MONOCYTES NFR BLD: 6 % (ref 1–7)
MORPHOLOGY: ABNORMAL
MORPHOLOGY: ABNORMAL
NEUTROPHILS NFR BLD: 82 % (ref 36–66)
NEUTS SEG NFR BLD: 11.14 K/UL (ref 1.3–9.1)
PLATELET # BLD AUTO: 235 K/UL (ref 150–450)
PMV BLD AUTO: 6.7 FL (ref 6–12)
POTASSIUM SERPL-SCNC: 4.4 MMOL/L (ref 3.7–5.3)
PROT SERPL-MCNC: 6.4 G/DL (ref 6.6–8.7)
RBC # BLD AUTO: 3.92 M/UL (ref 4–5.2)
SODIUM SERPL-SCNC: 137 MMOL/L (ref 136–145)
WBC OTHER # BLD: 13.6 K/UL (ref 3.5–11)

## 2024-12-05 PROCEDURE — 6370000000 HC RX 637 (ALT 250 FOR IP)

## 2024-12-05 PROCEDURE — 94640 AIRWAY INHALATION TREATMENT: CPT

## 2024-12-05 PROCEDURE — 2580000003 HC RX 258

## 2024-12-05 PROCEDURE — 6370000000 HC RX 637 (ALT 250 FOR IP): Performed by: NURSE PRACTITIONER

## 2024-12-05 PROCEDURE — 2700000000 HC OXYGEN THERAPY PER DAY

## 2024-12-05 PROCEDURE — 6360000002 HC RX W HCPCS

## 2024-12-05 PROCEDURE — 6360000002 HC RX W HCPCS: Performed by: NURSE PRACTITIONER

## 2024-12-05 PROCEDURE — 99239 HOSP IP/OBS DSCHRG MGMT >30: CPT | Performed by: INTERNAL MEDICINE

## 2024-12-05 PROCEDURE — 85025 COMPLETE CBC W/AUTO DIFF WBC: CPT

## 2024-12-05 PROCEDURE — 36415 COLL VENOUS BLD VENIPUNCTURE: CPT

## 2024-12-05 PROCEDURE — 2580000003 HC RX 258: Performed by: NURSE PRACTITIONER

## 2024-12-05 PROCEDURE — 80053 COMPREHEN METABOLIC PANEL: CPT

## 2024-12-05 PROCEDURE — 94664 DEMO&/EVAL PT USE INHALER: CPT

## 2024-12-05 PROCEDURE — 94761 N-INVAS EAR/PLS OXIMETRY MLT: CPT

## 2024-12-05 RX ORDER — PREDNISONE 20 MG/1
TABLET ORAL
Qty: 30 TABLET | Refills: 0 | Status: SHIPPED | OUTPATIENT
Start: 2024-12-05 | End: 2024-12-15

## 2024-12-05 RX ADMIN — WATER 40 MG: 1 INJECTION INTRAMUSCULAR; INTRAVENOUS; SUBCUTANEOUS at 02:51

## 2024-12-05 RX ADMIN — SODIUM CHLORIDE, PRESERVATIVE FREE 10 ML: 5 INJECTION INTRAVENOUS at 07:08

## 2024-12-05 RX ADMIN — LEVALBUTEROL HYDROCHLORIDE 1.25 MG: 1.25 SOLUTION RESPIRATORY (INHALATION) at 07:33

## 2024-12-05 RX ADMIN — Medication 2 PUFF: at 07:33

## 2024-12-05 RX ADMIN — QUETIAPINE FUMARATE 200 MG: 200 TABLET ORAL at 07:07

## 2024-12-05 RX ADMIN — ENOXAPARIN SODIUM 40 MG: 100 INJECTION SUBCUTANEOUS at 07:07

## 2024-12-05 RX ADMIN — IPRATROPIUM BROMIDE 0.5 MG: 0.5 SOLUTION RESPIRATORY (INHALATION) at 07:33

## 2024-12-05 RX ADMIN — IPRATROPIUM BROMIDE 0.5 MG: 0.5 SOLUTION RESPIRATORY (INHALATION) at 11:22

## 2024-12-05 RX ADMIN — CLONAZEPAM 0.25 MG: 0.5 TABLET ORAL at 07:07

## 2024-12-05 RX ADMIN — LEVALBUTEROL HYDROCHLORIDE 1.25 MG: 1.25 SOLUTION RESPIRATORY (INHALATION) at 11:22

## 2024-12-05 RX ADMIN — BENZONATATE 200 MG: 200 CAPSULE ORAL at 07:07

## 2024-12-05 RX ADMIN — Medication 60 MG: at 07:05

## 2024-12-05 RX ADMIN — GUAIFENESIN 1200 MG: 600 TABLET, EXTENDED RELEASE ORAL at 07:07

## 2024-12-05 RX ADMIN — WATER 40 MG: 1 INJECTION INTRAMUSCULAR; INTRAVENOUS; SUBCUTANEOUS at 08:16

## 2024-12-05 NOTE — PROGRESS NOTES
[facility] Internal Medicine  Robby Richardson MD; Jean Marie Bolaños MD; Nato Vides MD; MD Rebekah Winn MD; Joanna Leyva MD; Stacy Brown MD; Campbell Barajas MD    [facility] Internal Medicine   IN-PATIENT SERVICE   [facility]    Progress Note             Date:   12/4/2024  Patient name:  Eugenie Estrada  Date of admission:  11/28/2024  2:14 PM  MRN:   [id number]  Account:  [id number]  YOB: 1970  PCP:    Amy Joseph MD  Room:   2075/2075-01  Code Status:    Full Code    Chief Complaint:     Chief Complaint   Patient presents with    Shortness of Breath    Fatigue        History Obtained From:     patient, electronic medical record    History of Present Illness:     Eugenie Estrada is a 54 y.o.  /  female who presents with Shortness of Breath and Fatigue   and is admitted to the hospital for the management of COPD exacerbation (HCC).    Eugenie Estrada is a 54 y.o. female patient with past medical history of pan lobar emphysema in the setting of current and 35-pack-year smoking history, borderline personality disorder, panic disorder, PTSD, recurrent MDD, bilateral pneumothorax status post pleurodesis in her 20s. She presented to the ED on 11/28/2024 with complaints of worsening shortness of breath, cough, and fatigue for the past 2 days and states she had a sick contact who had a URI.  Patient is not on home oxygen and states she is compliant with her inhalers and has been needing her rescue inhaler more often in the past few days (20 times a day prior to admission).  Of note, patient has history of multiple hospitalizations for COPD exacerbation, most recently in September.  Patient denied fever, chills, abdominal pain, sputum production, headache on admission.     In the ER, patient was tachycardic 127, hypoxic in low 90s on room air on arrival. Patient was then started on 2 L oxygen via nasal cannula with 
    [facility] PULMONARY,CRITICAL CARE & SLEEP   Didier Velázquez MD/Stevie EPSTEIN AGAP-BC, NP-C      Breann EPSTEIN NP-C     Jose M EPSTEIN NP-C                                          Pulmonary Progress Note    Patient - Eugenie Estrada   Age - 54 y.o.   - 1970  MRN - [id number]  Acct # - [id number]  Date of Admission - 2024  2:14 PM    Consulting Service/Physician:       Primary Care Physician: Amy Joseph MD    SUBJECTIVE:     Chief Complaint:   Chief Complaint   Patient presents with    Shortness of Breath    Fatigue     Subjective:    Yulissa is seen sitting up in bed.  She states her breathing feels about the same.  She continues with a congested cough and wheezing.  She has been unable to bring up any secretions.  She is using a flutter valve.  She continues on IV Solu-Medrol.  She has been afebrile.    VITALS  /86   Pulse (!) 108   Temp 97.8 °F (36.6 °C) (Oral)   Resp 20   Ht 1.702 m (5' 7\")   Wt 59 kg (130 lb)   LMP  (LMP Unknown)   SpO2 95%   BMI 20.36 kg/m²   Wt Readings from Last 3 Encounters:   24 59 kg (130 lb)   24 59 kg (130 lb)   09/10/24 56.7 kg (125 lb)     I/O (24 Hours)    Intake/Output Summary (Last 24 hours) at 2024 0848  Last data filed at 2024 0844  Gross per 24 hour   Intake 300 ml   Output --   Net 300 ml     Ventilator:      Exam:   Physical Exam   Constitutional: Thin female sitting up in bed on 1-1/2 L nasal cannula in no acute distress  HENT: Unremarkable  Head: Normocephalic and atraumatic.   Eyes: EOM are normal. Pupils are equal, round, and reactive to light.   Neck: Neck supple.   Cardiovascular:  Regular rate and rhythm.  Normal heart tones.  No JVD.    Pulmonary/Chest: No dyspnea at rest, diffusely wheezy with decreased air exchange, on 1-1/2 L with pulse ox 95%  Abdominal: Soft. Bowel sounds are normal.   Musculoskeletal: Normal range of motion.   Neurological: Patient is 
    [facility] PULMONARY,CRITICAL CARE & SLEEP   Didier Velázquez MD/Stevie EPSTEIN AGACNP-BC, NP-C      Breann EPSTEIN NP-C    Jose M EPSTEIN NP-C                                         Pulmonary Progress Note    Patient - Eugenie Estrada   Age - 54 y.o.   - 1970  MRN - [id number]  Acct # - [id number]  Date of Admission - 2024  2:14 PM    Consulting Service/Physician:       Primary Care Physician: Amy Joseph MD    SUBJECTIVE:     Chief Complaint:   Chief Complaint   Patient presents with    Shortness of Breath    Fatigue     Subjective:    She is able to take in a deeper breath than she could yesterday, she does still have significant wheezing as well as some rhonchi, however not as significant as previous days.  She does still get short of breath with activity.  She does have severe bullous emphysema on CT imaging, and also had the same back in 2016.  No obvious infiltrates or mucous plugging noted on CT imaging.    She has not had any fevers or chills.  Vital signs have been stable.  She does notice a slight improvement today but still gets fairly dyspneic.  She has been maintained on 2 L of oxygen    VITALS  /85   Pulse 95   Temp 97.6 °F (36.4 °C) (Oral)   Resp 18   Ht 1.702 m (5' 7\")   Wt 59 kg (130 lb)   LMP  (LMP Unknown)   SpO2 93%   BMI 20.36 kg/m²   Wt Readings from Last 3 Encounters:   24 59 kg (130 lb)   24 59 kg (130 lb)   09/10/24 56.7 kg (125 lb)     I/O (24 Hours)  No intake or output data in the 24 hours ending 24 1029    Ventilator:      Exam:   Physical Exam   Constitutional:  Oriented to person, place, and time. Appears well-developed and well-nourished. Sitting up in bed, not as dyspneic today with activity  HENT: Unremarkable, on 2 L nasal cannula  Head: Normocephalic and atraumatic.   Eyes: EOM are normal. Pupils are equal, round, and reactive to light.   Neck: Neck supple.   Cardiovascular:  
    [facility] PULMONARY,CRITICAL CARE & SLEEP   Didier Velázquez MD/Stevie EPSTEIN AGACNP-BC, NP-C      Breann EPSTEIN NP-C    Jose M EPSTEIN NP-C                                         Pulmonary Progress Note    Patient - Eugenie Estrada   Age - 54 y.o.   - 1970  MRN - [id number]  Acct # - [id number]  Date of Admission - 2024  2:14 PM    Consulting Service/Physician:       Primary Care Physician: Amy Joseph MD    SUBJECTIVE:     Chief Complaint:   Chief Complaint   Patient presents with    Shortness of Breath    Fatigue     Subjective:    She is finally starting to feel little better, she still has persistent wheezing but improving, she still has some rhonchorous airways but again improving.  She is using the vibratory device, she is not bringing up any phlegm.  She states she can ambulate to the bathroom without feeling too short of breath however she ambulates closer to the hallway washes her face and does too much activity she does get winded and needs to sit down to take a break.  She does not have oxygen at home.    She did have her oxygen decreased to 1 L this morning.      VITALS  /82   Pulse 85   Temp 97.7 °F (36.5 °C) (Oral)   Resp 17   Ht 1.702 m (5' 7\")   Wt 59 kg (130 lb)   LMP  (LMP Unknown)   SpO2 95%   BMI 20.36 kg/m²   Wt Readings from Last 3 Encounters:   24 59 kg (130 lb)   24 59 kg (130 lb)   09/10/24 56.7 kg (125 lb)     I/O (24 Hours)    Intake/Output Summary (Last 24 hours) at 2024 1023  Last data filed at 2024 0944  Gross per 24 hour   Intake 5 ml   Output --   Net 5 ml       Ventilator:      Exam:   Physical Exam   Constitutional:  Oriented to person, place, and time. Appears well-developed and well-nourished.  Lying in bed, looks fairly comfortable  HENT: Unremarkable, nasal cannula in place  Head: Normocephalic and atraumatic.   Eyes: EOM are normal. Pupils are equal, round, and reactive 
    [facility] PULMONARY,CRITICAL CARE & SLEEP   Didier Velázquez MD/Stevie EPSTEIN AGAP-BC, NP-C      Breann EPSTEIN NP-C    Jose M EPSTEIN NP-C                                         Pulmonary Progress Note    Patient - Eugenie Estrada   Age - 54 y.o.   - 1970  MRN - [id number]  Acct # - [id number]  Date of Admission - 2024  2:14 PM    Consulting Service/Physician:       Primary Care Physician: Amy Joseph MD    SUBJECTIVE:     Chief Complaint:   Chief Complaint   Patient presents with    Shortness of Breath    Fatigue     Subjective:    She continues to have wheezing, very persistant worse with exertion and coughing episodes, still dyspneic with activity, requiring 2L, she has not had any fevers, she completed course of zithromax, still on high dose steroids, without much significant improvement    VITALS  /75   Pulse 91   Temp 98.6 °F (37 °C) (Oral)   Resp 22   Ht 1.702 m (5' 7\")   Wt 59 kg (130 lb)   LMP  (LMP Unknown)   SpO2 92%   BMI 20.36 kg/m²   Wt Readings from Last 3 Encounters:   24 59 kg (130 lb)   24 59 kg (130 lb)   09/10/24 56.7 kg (125 lb)     I/O (24 Hours)    Intake/Output Summary (Last 24 hours) at 2024 1151  Last data filed at 2024 1822  Gross per 24 hour   Intake 720 ml   Output --   Net 720 ml     Ventilator:      Exam:   Physical Exam   Constitutional:  Oriented to person, place, and time. Appears well-developed and well-nourished. Sitting up in bed, gets very dyspneic with activity  HENT: Unremarkable, on 2 L  Head: Normocephalic and atraumatic.   Eyes: EOM are normal. Pupils are equal, round, and reactive to light.   Neck: Neck supple.   Cardiovascular:  Regular rate and rhythm.  Normal heart tones.  No JVD.    Pulmonary/Chest: Lung sounds with harsh rhonchi and wheezing, worse with activity, harsh bronchitic cough, no significant improvement over the last several days, dyspneic with 
   12/03/24 1406   Encounter Summary   Encounter Overview/Reason Loneliness/Social Isolation       
   12/04/24 2159   Oxygen Therapy/Pulse Ox   O2 Therapy Room air   O2 Device None (Room air)   O2 Flow Rate (L/min) 0 L/min   Pulse 93   Pulse Oximeter Device Mode Continuous   Pulse Oximeter Device Location Finger   $Pulse Oximeter $Overnight     Overnight POX initiated on room. Only add oxygen if desaturation is <85% for more than 5 minutes.  
  Physician Progress Note      PATIENT:               JAYE HEBERT  [facility] #:                  [id number]  :                       1970  ADMIT DATE:       2024 2:14 PM  DISCH DATE:  RESPONDING  PROVIDER #:        Stacy Brown MD          QUERY TEXT:    Pt admitted with Acute exacerbation COPD.  Pt noted to have Acute hypoxic   respiratory insufficiency Pulmonology PN on . If possible, please   document in the progress notes and discharge summary if you are evaluating   and/or treating any of the following:    The medical record reflects the following:  Risk Factors: Acute exacerbation COPD, Rhinovirus infection,  Clinical Indicators: Pulmonology PN on  Acute hypoxic respiratory   insufficiency, now requiring 2 L, we did test her at her last hospital visit   in September, she did not qualify for any oxygen  SpO2 87%, 88%,  RR 24, 22, 24  FMPN on  Positive for cough, shortness of breath and wheezing.  ED note on  Respiratory distress present, Wheezing present.  Treatment: 2 L NC, XR chest,    Thank you,  CHARLENE Mosley CDS  Options provided:  -- Acute respiratory failure with hypoxia  -- Other - I will add my own diagnosis  -- Disagree - Not applicable / Not valid  -- Disagree - Clinically unable to determine / Unknown  -- Refer to Clinical Documentation Reviewer    PROVIDER RESPONSE TEXT:    This patient is in acute respiratory failure with hypoxia.    Query created by: Ramiro Gastelum on 12/3/2024 3:56 AM      Electronically signed by:  Stacy Brown MD 12/3/2024 4:12 PM          
AVS reviewed with patient. All questions answered. IV removed no complications. Personal transportation called.   
BRONCHOSPASM/BRONCHOCONSTRICTION     [x]         IMPROVE AERATION/BREATH SOUNDS  [x]   ADMINISTER BRONCHODILATOR THERAPY AS APPROPRIATE  [x]   ASSESS BREATH SOUNDS  []   IMPLEMENT AEROSOL/MDI PROTOCOL  [x]   PATIENT EDUCATION AS NEEDED    
CLINICAL PHARMACY NOTE: MEDS TO BEDS    Total # of Prescriptions Filled: 1   The following medications were delivered to the patient:  Prednisone 20mg    Additional Documentation: 12/5/24 1:15pm linda delivered to pt themself in room put with belongings on bed  
[facility]  IN-PATIENT SERVICE  [facility]    PROGRESS NOTE             11/30/2024    10:01 AM    Name:   Eugenie Estrada  MRN:     [id number]     Acct:      [id number]   Room:   2075/2075-01   Day:  2  Admit Date:  11/28/2024  2:14 PM    PCP:  Amy oJseph MD  Code Status:  Full Code    Subjective:     C/C:   Chief Complaint   Patient presents with    Shortness of Breath    Fatigue     Interval History Status: Significantly improved.    -Patient seen and examined at bedside.    -No new, acute events overnight.    -Patient hemodynamically stable, saturating in the high 90's on 2 L O2 via nasal cannula.   -Continues to be tachycardic.  -Labs: Rhinovirus positive.  WBC's trending down.  -Consult:  -Pulmonology consulted, continue IV Solu-Medrol, IV Zithromax, DuoNeb's every 4 hours, follow-up outpatient for PFT's.  -DC planning: Home with home O2 eval prior to discharge.    Brief History:     The patient is a 54 y.o.  /  female with comorbidities of panlobular emphysema likely 2/2 tobacco abuse, bilateral pneumothorax s/p pleurodesis in her 20's, borderline personality disorder, panic disorder with agoraphobia, PTSD and recurrent major depressive disorder who presented to the ED due to shortness of breath, cough and fatigue.  Patient presented to the ED yesterday with similar symptoms and was advised on admission however declined as she wanted to spend Thanksgiving with her family however she is complaining of worsening shortness of breath today which is why she returned to the ED.     Patient states that her symptoms started on Tuesday, 11/26/2024 when her roommate also got sick with a URI.  Denies using oxygen at home.  Endorses compliance with inhalers, and states that she has been needing to use her rescue inhaler, albuterol more since the past couple of days, has used approximately 20 times since yesterday, likely contributing to her 
Comprehensive Nutrition Assessment    Type and Reason for Visit:  Initial, LOS (LOS 7-days)    Nutrition Recommendations/Plan:   Continue current diet.     Nutrition Assessment:    Pt admitted for COPD exacerbation, with PMH of emphysema, asthma, bronchitis; currently smoker. Pt reporting fair intake of meals, no issues with swallowing, and refusing oral nutrition supplements. Pt reports that she has been eating better during hospitalization than at home, that she has food being brought to her daily. Home eating pattern consists of one meal a day and couple snacks. Noted 4% wt loss in 3 months.    Nutrition Related Findings:    Constipation, BM 11/30. Labs: Gluc 128, WBC 13.6. No edema. Steroid, Levalbuterol, Albuterol, Symbicort. Wound Type: None       Current Nutrition Intake & Therapies:    Average Meal Intake: 51-75%  Average Supplements Intake: Refusing to take  ADULT DIET; Regular    Anthropometric Measures:  Height: 170.2 cm (5' 7.01\")  Ideal Body Weight (IBW): 135 lbs (61 kg)    Admission Body Weight: 59 kg (130 lb 1.1 oz)  Current Body Weight: 59 kg (130 lb 1.1 oz), 96.3 % IBW. Weight Source: Stated  Current BMI (kg/m2): 20.4  Usual Body Weight: 56.7 kg (125 lb) (9/10/2024 (~3mo.) Stated)     % Weight Change (Calculated): 4.1  Weight Adjustment For: No Adjustment                 BMI Categories: Normal Weight (BMI 18.5-24.9)    Estimated Daily Nutrient Needs:  Energy Requirements Based On: Formula  Weight Used for Energy Requirements: Current  Energy (kcal/day): 2127-2280 kcal/day based on [place]-St. Jeor 1.3-1.4 factor  Weight Used for Protein Requirements: Current  Protein (g/day): 71-83 g/day based on 1.2-1.4 g/kg  Method Used for Fluid Requirements: 1 ml/kcal  Fluid (ml/day): or per physician    Nutrition Interventions:   Food and/or Nutrient Delivery: Continue Current Diet  Nutrition Education/Counseling: No recommendation at this time  Coordination of Nutrition Care: Continue to monitor while 
[facility]   OCCUPATIONAL THERAPY MISSED TREATMENT NOTE   INPATIENT   Date: 24  Patient Name: Eugenie Estrada       Room:   MRN: [id number]   Account #: [id number]    : 1970  (54 y.o.)  Gender: female                 REASON FOR MISSED TREATMENT:     -   OT being discontinued at this time. Patient functioning at Premorbid Level  No further needs  Patient is currently independent with selfcare tasks and functional mobility. No skilled OT services needed at this time. Please reorder if needs arise. 910 am            Electronically signed by HEATHER Gandhi on 24 at 12:14 PM EST   
Dr. Monsivais notified of new patient consult d/t Acute hypoxemic respiratory failure in the setting of panlobular emphysema   
Home Oxygen Evaluation    Room air SpO2 at Rest = 93%    Room air with exercise/exertion = 84%    SpO2 on prescribed O2 level at  2LPM  at rest =  94%    2LPM  with exercise/exertion =93%    Pt does qualify for home oxygen with exertion  
[facility]  IN-PATIENT SERVICE  [facility]    PROGRESS NOTE             11/29/2024    7:23 AM    Name:   Eugenie Estrada  MRN:     [id number]     Acct:      [id number]   Room:   2075/2075-01   Day:  1  Admit Date:  11/28/2024  2:14 PM    PCP:  Amy Joseph MD  Code Status:  Full Code    Subjective:     C/C:   Chief Complaint   Patient presents with    Shortness of Breath    Fatigue     Interval History Status: Improved.    -Patient seen and examined at bedside.    -No new, acute events overnight.    -Patient hemodynamically stable, saturating in the high 90's on 2 L O2 via nasal cannula.  Continues to be tachycardic.  -Labs:   -WBC trending up, 13.0 today likely 2/2 IV steroids.  -Respiratory panel positive for rhinovirus.  -Consult: Pulmonology consulted, recommendations appreciated.    Brief History:     The patient is a 54 y.o.  /  female with comorbidities of panlobular emphysema likely 2/2 tobacco abuse, bilateral pneumothorax s/p pleurodesis in her 20's, borderline personality disorder, panic disorder with agoraphobia, PTSD and recurrent major depressive disorder who presented to the ED due to shortness of breath, cough and fatigue.  Patient presented to the ED yesterday with similar symptoms and was advised on admission however declined as she wanted to spend Thanksgiving with her family however she is complaining of worsening shortness of breath today which is why she returned to the ED.     Patient states that her symptoms started on Tuesday, 11/26/2024 when her roommate also got sick with a URI.  Denies using oxygen at home.  Endorses compliance with inhalers, and states that she has been needing to use her rescue inhaler, albuterol more since the past couple of days, has used approximately 20 times since yesterday, likely contributing to her tachycardia.  Patient has a history of multiple hospitalizations due to COPD exacerbation.  Last 
Message sent to NP Apryl Low regarding pt's medications being ordered incorrectly on admission. Meds adjusted to reflect home regimen.   
Patient seen and examined  Case discussed with family medicine resident team  Patient has multiple medical problem which include PTSD, COPD, history of smoking, major depression, history of bilateral pneumothorax status post bilateral pleurodesis in the past  Admitted worsening shortness of breath, cough symptoms started since Tuesday when her roommate was sick  Has audible wheeze bilaterally on clinical exam  Has history of hospitalization with COPD acute exacerbation in the past  Claims that she is compliant with her inhalers  Chest x-ray done in emergency room negative for pneumonia  ABG seen, no CO2 retention  Will treat patient in lines of COPD acute exacerbation, with steroids, DuoNeb nebulization, IV azithromycin  Will also order respiratory viral panel  Pulmonary medicine consulted          Full dictation to follow  
Physical Therapy  [facility]   Physical Therapy Treatment  Date: 24  Patient Name: Eugenie Etsrada       Room: 5-  MRN: [id number]  Account: [id number]   : 1970  (54 y.o.) Gender: female     Discharge Recommendations:  The patient's needs are being met with no further Physical Therapy recommended at discharge    General  Patient assessed for rehabilitation services?: Yes  Additional Pertinent Hx: The patient is a 54 y.o.  /  female with comorbidities of panlobular emphysema likely 2/2 tobacco abuse, bilateral pneumothorax s/p pleurodesis in her 20's, borderline personality disorder, panic disorder with agoraphobia, PTSD and recurrent major depressive disorder who presented to the ED due to shortness of breath, cough and fatigue.  Patient presented to the ED yesterday with similar symptoms and was advised on admission however declined as she wanted to spend Thanksgiving with her family however she is complaining of worsening shortness of breath today which is why she returned to the ED.     Patient states that her symptoms started on Tuesday, 2024 when her roommate also got sick with a URI.  Denies using oxygen at home.  Endorses compliance with inhalers, and states that she has been needing to use her rescue inhaler, albuterol more since the past couple of days, has used approximately 20 times since yesterday, likely contributing to her tachycardia.  Patient has a history of multiple hospitalizations due to COPD exacerbation.  Last hospitalization was in September, after inhalation of pesticides.  Patient has a significant smoking history, smokes 0.5 to 1 pack daily since the age of 17.     Denies any fever, chills, abdominal pain, sputum production, diarrhea, constipation, headaches, lightheadedness or lower extremity swelling.     In the ER, patient tachycardic with pulse of 127 and hypoxic saturating in the low 90's on room air upon arrival.  Started 
Spiritual Health History and Assessment/Progress Note  [facility]    (P) Spiritual/Emotional Needs,  ,  ,      Name: Eugenie Estrada MRN: [id number]    Age: 54 y.o.     Sex: female   Language: English   Confucianist: None   COPD exacerbation (HCC)     Date: 11/30/2024            Total Time Calculated: (P) 15 min              Spiritual Assessment began in [facility]        Referral/Consult From: (P) Nurse   Encounter Overview/Reason: (P) Spiritual/Emotional Needs  Service Provided For: (P) Patient    Patient sitting up in bed upon entry, writer had ACP conversation with patient. Patient stated that she was not up to doing ACP right now. Informed patient to let nursing staff know when she's ready so they could contact SC.    Ledy, Belief, Meaning:   Patient unable to assess at this time  Family/Friends No family/friends present      Importance and Influence:  Patient unable to assess at this time  Family/Friends No family/friends present    Community:  Patient feels well-supported. Support system includes: Children and Other: Ex-  Family/Friends No family/friends present    Assessment and Plan of Care:     Patient Interventions include: Facilitated expression of thoughts and feelings and Affirmed coping skills/support systems  Family/Friends Interventions include: No family/friends present    Patient Plan of Care: Spiritual Care available upon further referral  Family/Friends Plan of Care: No family/friends present    Electronically signed by Chaplain Julio on 11/30/2024 at 3:09 PM   
for COPD exacerbation, most recently in September.  Patient denied fever, chills, abdominal pain, sputum production, headache on admission.    In the ER, patient was tachycardic 127, hypoxic in low 90s on room air on arrival. Patient was then started on 2 L oxygen via nasal cannula with significantly improved saturation.  ECG revealed sinus tachycardia and age undetermined lateral infarct. Troponins were negative. VBG on 11/27/2024 revealed respiratory alkalosis with no CO2 retention.  Chest x-ray revealed chronic pulmonary changes and no acute cardiopulmonary process.  Patient received 1 dose of IV Solu-Medrol 40 mg and started on DuoNebs.  Respiratory panel was positive for rhino/enterovirus.    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    Review of Systems:     Review of Systems   Constitutional:  Negative for chills and fever.   HENT:  Positive for rhinorrhea. Negative for sore throat.    Respiratory:  Positive for cough, shortness of breath and wheezing.    Cardiovascular:  Negative for chest pain and palpitations.   Gastrointestinal:  Positive for constipation. Negative for abdominal pain and diarrhea.   Genitourinary:  Negative for dysuria and hematuria.   Neurological:  Negative for light-headedness and headaches.     Medications:     Allergies:    Allergies   Allergen Reactions    Bee Pollen Swelling    Neosporin [Neomycin-Polymyxin-Gramicidin]      hives    Triple Antibiotic [Neomycin-Bacitracin Zn-Polymyx]      hives     Current Meds:   Scheduled Meds:    clonazePAM  0.25 mg Oral TID    escitalopram  20 mg Oral Nightly    guaiFENesin  1,200 mg Oral BID    levalbuterol  1.25 mg Nebulization Q4H While awake    ipratropium  0.5 mg Nebulization 4x Daily RT    benzonatate  200 mg Oral TID    dextromethorphan  60 mg Oral 2 times per day    budesonide-formoterol  2 puff Inhalation BID RT    
past year or any fall with injury in the past year?: Yes (2 months ago, missed step in dark)  ADL Assistance: Independent  Homemaking Responsibilities:  (Ex- does laundry.)  Ambulation Assistance: Independent  Transfer Assistance: Independent  Active : No  Patient's  Info: Son or Friend Sabra  Mode of Transportation: Car, Truck  Occupation: On disability  Additional Comments: Pt does light meals, exhusbnand does  cleaning, laundry    Restrictions  Restrictions/Precautions  Restrictions/Precautions: Contact Precautions (Droplet)  Required Braces or Orthoses?: No     Objective  Orientation  Overall Orientation Status: Within Functional Limits  Vision  Vision: Within Functional Limits  Hearing  Hearing: Within functional limits  Sensation  Overall Sensation Status: WFL          Transfers  Transfers  Sit to Stand: Independent  Stand to Sit: Independent  Bed to Chair: Independent  Comment: Pt nancy to ambulate on here own in the room     Ambulation      Ambulation  Surface: Level tile  Device: No Device  Assistance: Independent  Quality of Gait: Pt had  increased dyspnea with activity , pt at times will stand for few seconds to cathc her breath  Distance: 70 ft  Comments: acitivity performed at room air, pt destas to 85% at room erickson, Pt given 2 lt o2, spo2 increased to 92% after 40 to 45 seconds of rest and deep breathiing.         Bed Mobility  Bed mobility  Rolling to Left: Independent  Rolling to Right: Independent  Supine to Sit: Independent  Sit to Supine: Independent  Scooting: Independent     Balance  Balance  Posture: Good  Sitting - Static: Good  Sitting - Dynamic: Fair, + (Difficulty bending over.)  Standing - Static: Good  Standing - Dynamic: Fair, +    LE Function  AROM RLE (degrees)  RLE AROM: WFL  Strength RLE  Strength RLE: WFL     AROM LLE (degrees)  LLE AROM : WFL    UE Function  Strength RUE  Strength RUE: WFL                      Vitals  Vitals  SpO2: 93 %  O2 Device: Nasal cannula  BP 
patient was tachycardic 127, hypoxic in low 90s on room air on arrival. Patient was then started on 2 L oxygen via nasal cannula with significantly improved saturation.  ECG revealed sinus tachycardia and age undetermined lateral infarct. Troponins were negative. VBG on 11/27/2024 revealed respiratory alkalosis with no CO2 retention.  Chest x-ray revealed chronic pulmonary changes and no acute cardiopulmonary process.  Patient received 1 dose of IV Solu-Medrol 40 mg and started on DuoNebs.  Respiratory panel was positive for rhino/enterovirus.    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    Review of Systems:     Review of Systems   Constitutional:  Negative for chills and fever.   HENT:  Negative for rhinorrhea and sore throat.    Respiratory:  Positive for cough, shortness of breath and wheezing.    Cardiovascular:  Negative for chest pain and palpitations.   Gastrointestinal:  Positive for constipation. Negative for abdominal pain and diarrhea.   Genitourinary:  Negative for dysuria and hematuria.   Neurological:  Negative for light-headedness and headaches.     Medications:     Allergies:    Allergies   Allergen Reactions    Bee Pollen Swelling    Neosporin [Neomycin-Polymyxin-Gramicidin]      hives    Triple Antibiotic [Neomycin-Bacitracin Zn-Polymyx]      hives     Current Meds:   Scheduled Meds:    clonazePAM  0.25 mg Oral TID    escitalopram  20 mg Oral Nightly    guaiFENesin  1,200 mg Oral BID    levalbuterol  1.25 mg Nebulization Q4H While awake    ipratropium  0.5 mg Nebulization 4x Daily RT    benzonatate  200 mg Oral TID    dextromethorphan  60 mg Oral 2 times per day    budesonide-formoterol  2 puff Inhalation BID RT    nicotine  1 patch TransDERmal Daily    pravastatin  20 mg Oral QPM    sodium chloride flush  5-40 mL IntraVENous 2 times per day    enoxaparin  40 mg SubCUTAneous 
IntraVENous, PRN, Felicia Jorgensen MD    ondansetron (ZOFRAN-ODT) disintegrating tablet 4 mg, 4 mg, Oral, Q8H PRN **OR** ondansetron (ZOFRAN) injection 4 mg, 4 mg, IntraVENous, Q6H PRN, Felicia Jorgensen MD    polyethylene glycol (GLYCOLAX) packet 17 g, 17 g, Oral, Daily PRN, Felicia Jorgensen MD    enoxaparin (LOVENOX) injection 40 mg, 40 mg, SubCUTAneous, Daily, Felicia Jorgensen MD, 40 mg at 12/01/24 0820    acetaminophen (TYLENOL) tablet 650 mg, 650 mg, Oral, Q6H PRN, 650 mg at 12/01/24 0820 **OR** acetaminophen (TYLENOL) suppository 650 mg, 650 mg, Rectal, Q6H PRN, Felicia Jorgensen MD    albuterol sulfate HFA (PROVENTIL;VENTOLIN;PROAIR) 108 (90 Base) MCG/ACT inhaler 2 puff, 2 puff, Inhalation, Q6H PRN, Felicia Jorgensen MD    methylPREDNISolone sodium succ (SOLU-MEDROL) 40 mg in sterile water 1 mL injection, 40 mg, IntraVENous, Q6H, Felicia Jorgensen MD, 40 mg at 12/01/24 0821    mirtazapine (REMERON SOL-TAB) disintegrating tablet 15 mg, 15 mg, Oral, Nightly, Apryl Low, APRN - NP, 15 mg at 11/30/24 2034    traZODone (DESYREL) tablet 150 mg, 150 mg, Oral, Nightly, Apryl Low, APRN - NP, 150 mg at 11/30/24 2031    QUEtiapine (SEROQUEL) tablet 200 mg, 200 mg, Oral, BID, 200 mg at 12/01/24 0819 **AND** QUEtiapine (SEROQUEL) tablet 400 mg, 400 mg, Oral, Nightly, Apryl Low, APRN - NP, 400 mg at 11/30/24 2031    Lab Results:     Lab Results   Component Value Date    WBC 11.9 (H) 12/01/2024    HGB 11.3 (L) 12/01/2024    HCT 34.0 (L) 12/01/2024    MCV 88.6 12/01/2024     12/01/2024     Lab Results   Component Value Date    CALCIUM 9.4 12/01/2024     12/01/2024    K 4.8 12/01/2024    CO2 22 12/01/2024     12/01/2024    BUN 25 (H) 12/01/2024    CREATININE 0.9 12/01/2024       Lab Results   Component Value Date    INR 0.9 11/28/2024    PROTIME 12.8 11/28/2024       Radiology:     ASSESSMENT:       Rhinovirus infection  Acute exacerbation COPD secondary to rhinovirus  Acute hypoxic respiratory insufficiency, now 
ensure accuracy. However, inadvertent computerized transcription errors may be present.    LO RODRÍGUEZ-BC, NP-C  [facility]  
0.21 (H) 0 k/uL    Morphology ANISOCYTOSIS PRESENT        Imaging/Diagnostics:  CT CHEST WO CONTRAST    Result Date: 12/2/2024  1. Severe pattern of emphysema with fibrotic change and bullous change. 2. No acute finding in the chest.     XR CHEST PORTABLE    Result Date: 12/2/2024  Chronic changes in the bilateral lung apices.  No acute cardiopulmonary findings     XR CHEST PORTABLE    Result Date: 11/28/2024  Chronic pulmonary change without acute cardiopulmonary process.     XR CHEST PORTABLE    Result Date: 11/27/2024  1. Diffuse emphysema, without evidence of superimposed consolidation. 2. Slight blunting of the left costophrenic angle, possibly representing small effusion or atelectasis.       Assessment :      Hospital Problems             Last Modified POA    * (Principal) COPD exacerbation (HCC) 11/28/2024 Yes       Plan:     Patient status inpatient in the Med/Surge    Acute hypoxic respiratory failure secondary to acute exacerbation of COPD secondary to rhinovirus.  On 2 L currently, on room air at baseline at home.  Continue ICS, vibratory device.  Continue IV steroids, appreciate pulmonology input.  Smoker, 30-pack-year history.  Nicotine patch.  Has psychiatric history.  Continue home medications.  DVT prophylaxis Lovenox.    Consultations:   IP CONSULT TO INTERNAL MEDICINE  IP CONSULT TO SOCIAL WORK  IP CONSULT TO PULMONOLOGY  IP CONSULT TO SPIRITUAL SERVICES    Campbell Barajas MD  12/3/2024  11:20 AM      Please note that this chart was generated using voice recognition Dragon dictation software.  Although every effort was made to ensure the accuracy of this automated transcription, some errors in transcription may have occurred.

## 2024-12-05 NOTE — DISCHARGE INSTR - COC
Concerns:085517748}    Impairments/Disabilities:      { LAUREN Impairments/Disabilities:257666807}    Nutrition Therapy:  Current Nutrition Therapy:   { LAUREN Diet List:885789405}    Routes of Feeding: {[facility] Other Feedings:892567897}  Liquids: {Slp liquid thickness:77771}  Daily Fluid Restriction: {[facility] Yes amt example:389220010}  Last Modified Barium Swallow with Video (Video Swallowing Test): {Done Not Done Date:}    Treatments at the Time of Hospital Discharge:   Respiratory Treatments: ***  Oxygen Therapy:  {Therapy; copd oxygen:19115}  Ventilator:    {[facility] Vent List:834588632}    Rehab Therapies: {THERAPEUTIC INTERVENTION:2968845156}  Weight Bearing Status/Restrictions: {[facility] Weight Bearin}  Other Medical Equipment (for information only, NOT a DME order):  {EQUIPMENT:344830430}  Other Treatments: ***    Patient's personal belongings (please select all that are sent with patient):  {[facility] Belongings:470601450}    RN SIGNATURE:  {Esignature:176295986}    CASE MANAGEMENT/SOCIAL WORK SECTION    Inpatient Status Date: ***    Readmission Risk Assessment Score:  Readmission Risk              Risk of Unplanned Readmission:  27           Discharging to Facility/ Agency   Name:   Address:  Phone:  Fax:    Dialysis Facility (if applicable)   Name:  Address:  Dialysis Schedule:  Phone:  Fax:    / signature: {Esignature:154642813}    PHYSICIAN SECTION    Prognosis: {Prognosis:0938145838}    Condition at Discharge: { Patient Condition:387726422}    Rehab Potential (if transferring to Rehab): {Prognosis:7301185251}    Recommended Labs or Other Treatments After Discharge: ***    Physician Certification: I certify the above information and transfer of Eugenie Estrada  is necessary for the continuing treatment of the diagnosis listed and that she requires {Admit to Appropriate Level of Care:69166} for {GREATER/LESS:545098023} 30 days.     Update Admission H&P: {[facility]

## 2024-12-05 NOTE — DISCHARGE SUMMARY
into the lungs daily            CHANGE how you take these medications      predniSONE 20 MG tablet  Commonly known as: DELTASONE  4 tabs po daily for 4 days, then 3 po daily for 3 days, then 2 po daily for 2 days, then 1 po daily for 1 day.  What changed:   medication strength  how much to take  how to take this  when to take this  additional instructions     * QUEtiapine 200 MG tablet  Commonly known as: SEROQUEL  Take 1 tablet by mouth 2 times daily at 0800 and 1400  What changed: Another medication with the same name was changed. Make sure you understand how and when to take each.     * QUEtiapine 400 MG tablet  Commonly known as: SEROQUEL  Take 1 tablet by mouth daily  What changed: when to take this           * This list has 2 medication(s) that are the same as other medications prescribed for you. Read the directions carefully, and ask your doctor or other care provider to review them with you.                CONTINUE taking these medications      albuterol sulfate  (90 Base) MCG/ACT inhaler  Commonly known as: PROVENTIL;VENTOLIN;PROAIR  INHALE 2 PUFFS BY MOUTH AND INTO THE LUNGS EVERY 6 HOURS IF NEEDED FOR WHEEZING OR SHORTNESS OF BREATH     clonazePAM 0.5 MG tablet  Commonly known as: KLONOPIN     diphenhydrAMINE 25 MG tablet  Commonly known as: BENADRYL     guaiFENesin 600 MG extended release tablet  Commonly known as: MUCINEX  Take 1 tablet by mouth 2 times daily     ipratropium 0.5 mg-albuterol 2.5 mg 0.5-2.5 (3) MG/3ML Soln nebulizer solution  Commonly known as: DUONEB  Inhale 3 mLs into the lungs every 4 hours (while awake)     Lexapro 20 MG tablet  Generic drug: escitalopram     mirtazapine 15 MG tablet  Commonly known as: REMERON     pravastatin 20 MG tablet  Commonly known as: Pravachol  Take 1 tablet by mouth every evening **Stop Crestor**. For high cholesterol     traZODone 150 MG tablet  Commonly known as: DESYREL            STOP taking these medications      doxycycline hyclate 100 MG

## 2024-12-05 NOTE — CARE COORDINATION
[facility] Encounter Date/Time: 2024 1414    Hospital Account: [id number]    MRN: [id number]    Patient: Jaye Estrada    Contact Serial #: [id number]      ENCOUNTER          Patient Class: I Private Enc?  No Unit RM BD: STCZ MED ANA LILIA    Hospital Service: MED   Encounter DX: Shortness of breath [R06*   ADM Provider: Rebekah Pulido MD   Procedure:     ATT Provider: Nato Vides MD   REF Provider:        Admission DX: Shortness of breath, COPD exacerbation (HCC) and DX codes: R06.02, J44.1      PATIENT             Name: Jaye Estrada : 1970 (54 yrs)   Address: [address] Sex: Female   City: [place] [address]         Marital Status: Single   Employer: DISABLED         [employer]: None   Primary Care Provider: Amy Joseph MD         Primary Phone: [phone]   EMERGENCY CONTACT   Name Home Phone Work Phone Mobile Phone Relationship Lgl Grd   BABITA ESTRADA [phone]     Child No   STUART GARY [phone] Other           GUARANTOR            Guarantor: Jaye M Sean     : 1970   Address: [address] Sex: [place] [address]     Relation to Patient: Self       Home Phone: [phone]   Guarantor ID: [id number]       Work Phone: [phone]   Guarantor Employer: DISABLED         Status: DISABLED      COVERAGE        PRIMARY INSURANCE   Payor: [employer] DUAL* Plan: [place] [employer] DUAL   Payor Address: [place] [address]       Group Number: [id number] Insurance Type: INDEMNITY   Subscriber Name: JAYE ESTRADA Subscriber : 1970   Subscriber ID: [id number] Pat. Rel. to Sub: Self   SECONDARY INSURANCE   Payor:   Plan:     Payor Address:  ,           Group Number:   Insurance Type:     Subscriber Name:   Subscriber :     Subscriber ID:   Pat. Rel. to Sub:          CSN: [id number]    [id number]        CSN                                    Req/Control # [Problem retrieving Specimen

## 2024-12-05 NOTE — PLAN OF CARE
Problem: Discharge Planning  Goal: Discharge to home or other facility with appropriate resources  12/5/2024 1326 by Jenny Villatoro RN  Outcome: Adequate for Discharge  12/5/2024 0249 by Schober, Robyn L, RN  Outcome: Progressing  Flowsheets (Taken 12/4/2024 2100)  Discharge to home or other facility with appropriate resources: Identify barriers to discharge with patient and caregiver     Problem: Safety - Adult  Goal: Free from fall injury  12/5/2024 1326 by Jenny Villatoro RN  Outcome: Adequate for Discharge  12/5/2024 0249 by Schober, Robyn L, RN  Outcome: Progressing  Flowsheets (Taken 12/5/2024 0001)  Free From Fall Injury: Based on caregiver fall risk screen, instruct family/caregiver to ask for assistance with transferring infant if caregiver noted to have fall risk factors     Problem: Pain  Goal: Verbalizes/displays adequate comfort level or baseline comfort level  12/5/2024 1326 by Jenny Villatoro RN  Outcome: Adequate for Discharge  12/5/2024 0249 by Schober, Robyn L, RN  Outcome: Progressing     Problem: Respiratory - Adult  Goal: Achieves optimal ventilation and oxygenation  12/5/2024 1326 by Jenny Villatoro RN  Outcome: Adequate for Discharge  12/5/2024 0249 by Schober, Robyn L, RN  Outcome: Progressing  Flowsheets (Taken 12/4/2024 2100)  Achieves optimal ventilation and oxygenation:   Assess for changes in respiratory status   Assess for changes in mentation and behavior     Problem: Gastrointestinal - Adult  Goal: Maintains or returns to baseline bowel function  12/5/2024 1326 by Jenny Villatoro RN  Outcome: Adequate for Discharge  12/5/2024 0249 by Schober, Robyn L, RN  Outcome: Progressing  Flowsheets (Taken 12/4/2024 2100)  Maintains or returns to baseline bowel function: Assess bowel function     Problem: Infection - Adult  Goal: Absence of infection at discharge  12/5/2024 1326 by Jenny Villatoro RN  Outcome: Adequate for Discharge  12/5/2024 0249 by Schober,

## 2024-12-05 NOTE — PLAN OF CARE
Problem: Discharge Planning  Goal: Discharge to home or other facility with appropriate resources  12/5/2024 0249 by Schober, Robyn L, RN  Outcome: Progressing  Flowsheets (Taken 12/4/2024 2100)  Discharge to home or other facility with appropriate resources: Identify barriers to discharge with patient and caregiver     Problem: Safety - Adult  Goal: Free from fall injury  12/5/2024 0249 by Schober, Robyn L, RN  Outcome: Progressing  Flowsheets (Taken 12/5/2024 0001)  Free From Fall Injury: Based on caregiver fall risk screen, instruct family/caregiver to ask for assistance with transferring infant if caregiver noted to have fall risk factors     Problem: Pain  Goal: Verbalizes/displays adequate comfort level or baseline comfort level  12/5/2024 0249 by Schober, Robyn L, RN  Outcome: Progressing     Problem: Respiratory - Adult  Goal: Achieves optimal ventilation and oxygenation  12/5/2024 0249 by Schober, Robyn L, RN  Outcome: Progressing  Flowsheets (Taken 12/4/2024 2100)  Achieves optimal ventilation and oxygenation:   Assess for changes in respiratory status   Assess for changes in mentation and behavior     Problem: Gastrointestinal - Adult  Goal: Maintains or returns to baseline bowel function  12/5/2024 0249 by Schober, Robyn L, RN  Outcome: Progressing  Flowsheets (Taken 12/4/2024 2100)  Maintains or returns to baseline bowel function: Assess bowel function     Problem: Infection - Adult  Goal: Absence of infection at discharge  12/5/2024 0249 by Schober, Robyn L, RN  Outcome: Progressing  Flowsheets (Taken 12/4/2024 2100)  Absence of infection at discharge:   Assess and monitor for signs and symptoms of infection   Monitor lab/diagnostic results     Problem: ABCDS Injury Assessment  Goal: Absence of physical injury  12/5/2024 0249 by Schober, Robyn L, RN  Outcome: Progressing  Flowsheets (Taken 12/5/2024 0001)  Absence of Physical Injury: Implement safety measures based on patient assessment     Problem:

## 2024-12-05 NOTE — CARE COORDINATION
ONGOING DISCHARGE PLAN:    Patient is alert and oriented x4.    Spoke with patient regarding discharge plan and patient confirms that plan is still home no needs.    Home O2 eval.    Solu-Medrol 40 mg very 8 hours.     Will continue to follow for additional discharge needs.    If patient is discharged prior to next notation, then this note serves as note for discharge by case management.    Electronically signed by Nelli Hale RN on 12/5/2024 at 1:17 PM

## 2024-12-05 NOTE — CARE COORDINATION
DISCHARGE PLANNING NOTE:    HOME OXYGEN:    Portable 02 tank delivered per [facility].  02 tank turned on and noted to be full.  Patient understands to call [facility] immediately upon arrival home to have 02 concentrator delivered.    Electronically signed by Nelli Hale RN on 12/5/2024 at 1:26 PM

## 2024-12-06 ENCOUNTER — TELEPHONE (OUTPATIENT)
Dept: FAMILY MEDICINE CLINIC | Age: 54
End: 2024-12-06

## 2024-12-06 NOTE — TELEPHONE ENCOUNTER
[facility] Transitions Initial Follow Up Call    Outreach made within 2 business days of discharge: Yes    Patient: Eugenie Estrada Patient : 1970   MRN: [id number]  Reason for Admission: There are no discharge diagnoses documented for the most recent discharge.  Discharge Date: 24       Spoke with: N/A/NO VOICEMAIL    If Virtual visit made for hospital follow up, advise patient to make sure to have family member present to help assist with visit. Please make sure mobile number is updated in patient chart.     Discharge department/facility: [facility] Interactive Patient Contact:  Was patient able to fill all prescriptions:   Was patient instructed to bring all medications to the follow-up visit:   Is patient taking all medications as directed in the discharge summary?   Does patient understand their discharge instructions:   Does patient have questions or concerns that need addressed prior to 7-14 day follow up office visit:     Scheduled appointment with PCP within 7-14 days    Follow Up  No future appointments.    Claudia Reece MA

## 2024-12-12 ENCOUNTER — TELEPHONE (OUTPATIENT)
Dept: FAMILY MEDICINE CLINIC | Age: 54
End: 2024-12-12

## 2024-12-12 NOTE — TELEPHONE ENCOUNTER
Patient needs hospital follow-up    If worsening symptoms to go to ER    No future appointments.

## 2024-12-12 NOTE — TELEPHONE ENCOUNTER
Patient called in, she was admitted in the hospital for a week for COPD she got out one week ago today..   Two days ago her legs started to swell, she said they hurt very bad the first day and was very tight, today they still hurt and are very achy but she can still get up and walk. I offered her your apt tomorrow or to do a Evisit, she said she could not get to the apt because of her oxygen and she wasn't good with computers so she wanted me to let you know what was going on and see what she should do or if it was okay.

## 2024-12-13 ENCOUNTER — HOSPITAL ENCOUNTER (EMERGENCY)
Age: 54
Discharge: HOME | End: 2024-12-13
Attending: STUDENT IN AN ORGANIZED HEALTH CARE EDUCATION/TRAINING PROGRAM
Payer: COMMERCIAL

## 2024-12-13 ENCOUNTER — HOSPITAL ENCOUNTER (EMERGENCY)
Dept: VASCULAR LAB | Age: 54
End: 2024-12-13
Attending: STUDENT IN AN ORGANIZED HEALTH CARE EDUCATION/TRAINING PROGRAM
Payer: COMMERCIAL

## 2024-12-13 ENCOUNTER — APPOINTMENT (OUTPATIENT)
Dept: GENERAL RADIOLOGY | Age: 54
End: 2024-12-13
Attending: STUDENT IN AN ORGANIZED HEALTH CARE EDUCATION/TRAINING PROGRAM
Payer: COMMERCIAL

## 2024-12-13 ENCOUNTER — TELEPHONE (OUTPATIENT)
Dept: FAMILY MEDICINE CLINIC | Age: 54
End: 2024-12-13

## 2024-12-13 VITALS
SYSTOLIC BLOOD PRESSURE: 110 MMHG | DIASTOLIC BLOOD PRESSURE: 72 MMHG | HEART RATE: 90 BPM | BODY MASS INDEX: 20.4 KG/M2 | RESPIRATION RATE: 12 BRPM | HEIGHT: 67 IN | OXYGEN SATURATION: 99 % | TEMPERATURE: 97.7 F | WEIGHT: 130 LBS

## 2024-12-13 DIAGNOSIS — M79.605 LEFT LEG PAIN: Primary | ICD-10-CM

## 2024-12-13 LAB
ALBUMIN SERPL-MCNC: 3.6 G/DL (ref 3.5–5.2)
ALP SERPL-CCNC: 68 U/L (ref 35–104)
ALT SERPL-CCNC: 35 U/L (ref 10–35)
ANION GAP SERPL CALCULATED.3IONS-SCNC: 9 MMOL/L (ref 9–16)
AST SERPL-CCNC: 16 U/L (ref 10–35)
BASOPHILS # BLD: 0 K/UL (ref 0–0.2)
BASOPHILS NFR BLD: 0 % (ref 0–2)
BILIRUB SERPL-MCNC: <0.2 MG/DL (ref 0–1.2)
BUN SERPL-MCNC: 19 MG/DL (ref 6–20)
CALCIUM SERPL-MCNC: 8.5 MG/DL (ref 8.6–10.4)
CHLORIDE SERPL-SCNC: 102 MMOL/L (ref 98–107)
CO2 SERPL-SCNC: 24 MMOL/L (ref 20–31)
CREAT SERPL-MCNC: 0.7 MG/DL (ref 0.7–1.2)
ECHO BSA: 1.67 M2
EKG ATRIAL RATE: 91 BPM
EKG P AXIS: 55 DEGREES
EKG P-R INTERVAL: 146 MS
EKG Q-T INTERVAL: 364 MS
EKG QRS DURATION: 72 MS
EKG QTC CALCULATION (BAZETT): 447 MS
EKG R AXIS: 7 DEGREES
EKG T AXIS: 46 DEGREES
EKG VENTRICULAR RATE: 91 BPM
EOSINOPHIL # BLD: 0.1 K/UL (ref 0–0.4)
EOSINOPHILS RELATIVE PERCENT: 1 % (ref 0–4)
ERYTHROCYTE [DISTWIDTH] IN BLOOD BY AUTOMATED COUNT: 15.7 % (ref 11.5–14.9)
GFR, ESTIMATED: >90 ML/MIN/1.73M2
GLUCOSE SERPL-MCNC: 94 MG/DL (ref 74–99)
HCT VFR BLD AUTO: 31.4 % (ref 36–46)
HGB BLD-MCNC: 10.5 G/DL (ref 12–16)
LYMPHOCYTES NFR BLD: 1.4 K/UL (ref 1–4.8)
LYMPHOCYTES RELATIVE PERCENT: 12 % (ref 24–44)
MAGNESIUM SERPL-MCNC: 2.2 MG/DL (ref 1.6–2.6)
MCH RBC QN AUTO: 29.8 PG (ref 26–34)
MCHC RBC AUTO-ENTMCNC: 33.6 G/DL (ref 31–37)
MCV RBC AUTO: 88.7 FL (ref 80–100)
MONOCYTES NFR BLD: 0.8 K/UL (ref 0.1–1.3)
MONOCYTES NFR BLD: 7 % (ref 1–7)
NEUTROPHILS NFR BLD: 80 % (ref 36–66)
NEUTS SEG NFR BLD: 9.3 K/UL (ref 1.3–9.1)
PLATELET # BLD AUTO: 230 K/UL (ref 150–450)
PMV BLD AUTO: 6.1 FL (ref 6–12)
POTASSIUM SERPL-SCNC: 4.1 MMOL/L (ref 3.7–5.3)
PROT SERPL-MCNC: 6 G/DL (ref 6.6–8.7)
RBC # BLD AUTO: 3.53 M/UL (ref 4–5.2)
SODIUM SERPL-SCNC: 135 MMOL/L (ref 136–145)
TROPONIN I SERPL HS-MCNC: 8 NG/L (ref 0–14)
WBC OTHER # BLD: 11.5 K/UL (ref 3.5–11)

## 2024-12-13 PROCEDURE — 6360000002 HC RX W HCPCS: Performed by: STUDENT IN AN ORGANIZED HEALTH CARE EDUCATION/TRAINING PROGRAM

## 2024-12-13 PROCEDURE — 80053 COMPREHEN METABOLIC PANEL: CPT

## 2024-12-13 PROCEDURE — 84484 ASSAY OF TROPONIN QUANT: CPT

## 2024-12-13 PROCEDURE — 85025 COMPLETE CBC W/AUTO DIFF WBC: CPT

## 2024-12-13 PROCEDURE — 96374 THER/PROPH/DIAG INJ IV PUSH: CPT

## 2024-12-13 PROCEDURE — 6370000000 HC RX 637 (ALT 250 FOR IP): Performed by: STUDENT IN AN ORGANIZED HEALTH CARE EDUCATION/TRAINING PROGRAM

## 2024-12-13 PROCEDURE — 83735 ASSAY OF MAGNESIUM: CPT

## 2024-12-13 PROCEDURE — 71045 X-RAY EXAM CHEST 1 VIEW: CPT

## 2024-12-13 PROCEDURE — 36415 COLL VENOUS BLD VENIPUNCTURE: CPT

## 2024-12-13 PROCEDURE — 99285 EMERGENCY DEPT VISIT HI MDM: CPT

## 2024-12-13 PROCEDURE — 93971 EXTREMITY STUDY: CPT

## 2024-12-13 RX ORDER — KETOROLAC TROMETHAMINE 30 MG/ML
15 INJECTION, SOLUTION INTRAMUSCULAR; INTRAVENOUS ONCE
Status: COMPLETED | OUTPATIENT
Start: 2024-12-13 | End: 2024-12-13

## 2024-12-13 RX ORDER — ACETAMINOPHEN 500 MG
1000 TABLET ORAL ONCE
Status: COMPLETED | OUTPATIENT
Start: 2024-12-13 | End: 2024-12-13

## 2024-12-13 RX ADMIN — ACETAMINOPHEN 1000 MG: 500 TABLET ORAL at 12:34

## 2024-12-13 RX ADMIN — KETOROLAC TROMETHAMINE 15 MG: 30 INJECTION, SOLUTION INTRAMUSCULAR at 12:41

## 2024-12-13 ASSESSMENT — PAIN SCALES - GENERAL
PAINLEVEL_OUTOF10: 4
PAINLEVEL_OUTOF10: 8
PAINLEVEL_OUTOF10: 8

## 2024-12-13 ASSESSMENT — ENCOUNTER SYMPTOMS
SHORTNESS OF BREATH: 1
ABDOMINAL PAIN: 0
COUGH: 0
NAUSEA: 0
VOMITING: 0
BACK PAIN: 0

## 2024-12-13 ASSESSMENT — PAIN DESCRIPTION - DESCRIPTORS: DESCRIPTORS: DISCOMFORT

## 2024-12-13 ASSESSMENT — PAIN DESCRIPTION - ORIENTATION: ORIENTATION: LEFT;RIGHT

## 2024-12-13 ASSESSMENT — PAIN - FUNCTIONAL ASSESSMENT
PAIN_FUNCTIONAL_ASSESSMENT: ACTIVITIES ARE NOT PREVENTED
PAIN_FUNCTIONAL_ASSESSMENT: 0-10

## 2024-12-13 ASSESSMENT — PAIN DESCRIPTION - LOCATION: LOCATION: LEG

## 2024-12-13 NOTE — TELEPHONE ENCOUNTER
Noted. Thank you!    Future Appointments   Date Time Provider Department Center   12/13/2024  3:30 PM [facility] VASCULAR 2 [facility] VASCLAB [facility] Radiolog

## 2024-12-13 NOTE — ED PROVIDER NOTES
The purpose of this study was to evaluate for venous compressabiity concerning for DVT.  The structures studied were venous vasculature.    FINDINGS:  Popliteal Vein: DVT present: No    The study was technically adequate.    Images uploaded to PACS      CONSULTS:  None    CRITICAL CARE:  There was a high probability of clinically significant/life threatening deterioration in this patient's condition which required my urgent intervention.  Total critical care time was 10 minutes.  This excludes any time for separately reportable procedures.     FINAL IMPRESSION     1. Left leg pain          DISPOSITION / PLAN   DISPOSITION Decision To Discharge 12/13/2024 02:19:57 PM   DISPOSITION CONDITION Stable           Evaluation and treatment course in the ED, and plan of care upon discharge was discussed in length with the patient/patient representative. The patient/patient representative understands that at this time there is no evidence for a more malignant underlying process, but also understands that early in the process of an illness or injury, an emergency department work-up can be falsely reassuring. Patient/patient representative had no further questions prior to being discharged and understands that worsening, changing or persistent symptoms should prompt a immediate call or follow-up with their primary care physician or return to the emergency department. Any changes to existing medications or new prescriptions were reviewed with patient/patient representative and they expressed understanding of how to correctly take their medications and the possible side effects. I have reviewed the disposition diagnosis with the patient/patient representative.  I have answered their questions and given discharge instructions.  They voiced understanding of these instructions and did not have any further questions or complaints. They were updated on all incidental findings.      PATIENT REFERRED TO:  Amy Joseph MD  [id number]

## 2024-12-13 NOTE — TELEPHONE ENCOUNTER
Eugenie called back.    She is going to go to the ED this morning, she is just waiting on her ride.    Thank you.

## 2024-12-13 NOTE — TELEPHONE ENCOUNTER
[facility] ED Follow up Call  Left leg pain   Reason for ED visit:       12/13/2024       FU appts/Provider:    Future Appointments   Date Time Provider Department Center   12/13/2024  3:30 PM [facility]         VOICEMAIL DOCUMENTATION - ERASE IF NOT USED  Hi, this message is for Eugenie.  This is Casandra Chun MA from Amy Ye office.  Just calling to see how you are doing after your recent visit to the Emergency Room.  Amy Ye wants to make sure you were able to fill any prescriptions and that you understand your discharge instructions.  Please return our call if you need to make a follow up appointment with your provider or have any further needs.   Our phone number is [phone].  Have a great day.

## 2024-12-13 NOTE — TELEPHONE ENCOUNTER
Called patient, no voicemail setup. I did try to schedule her a hospital follow up yesterday when she called in and she declined and said she could not get here and did not want to do virtual. I will try to get her to schedule again today.

## 2024-12-13 NOTE — DISCHARGE INSTRUCTIONS
Please follow-up with your primary care provider  Please continue to take all home medication as prescribed  Please use the compression stockings  And return to the ER with any severe worsening of symptoms that you cannot control at home

## 2024-12-17 LAB
EKG ATRIAL RATE: 91 BPM
EKG P AXIS: 55 DEGREES
EKG P-R INTERVAL: 146 MS
EKG Q-T INTERVAL: 364 MS
EKG QRS DURATION: 72 MS
EKG QTC CALCULATION (BAZETT): 447 MS
EKG R AXIS: 7 DEGREES
EKG T AXIS: 46 DEGREES
EKG VENTRICULAR RATE: 91 BPM

## 2024-12-26 ENCOUNTER — OFFICE VISIT (OUTPATIENT)
Dept: FAMILY MEDICINE CLINIC | Age: 54
End: 2024-12-26

## 2024-12-26 VITALS
HEIGHT: 67 IN | SYSTOLIC BLOOD PRESSURE: 110 MMHG | OXYGEN SATURATION: 97 % | BODY MASS INDEX: 21.35 KG/M2 | DIASTOLIC BLOOD PRESSURE: 70 MMHG | WEIGHT: 136 LBS | HEART RATE: 100 BPM | TEMPERATURE: 97 F

## 2024-12-26 DIAGNOSIS — J96.11 CHRONIC RESPIRATORY FAILURE WITH HYPOXIA: ICD-10-CM

## 2024-12-26 DIAGNOSIS — D64.9 ANEMIA, UNSPECIFIED TYPE: ICD-10-CM

## 2024-12-26 DIAGNOSIS — R73.9 HYPERGLYCEMIA: ICD-10-CM

## 2024-12-26 DIAGNOSIS — I31.39 PERICARDIAL EFFUSION: ICD-10-CM

## 2024-12-26 DIAGNOSIS — E78.2 MIXED HYPERLIPIDEMIA: ICD-10-CM

## 2024-12-26 DIAGNOSIS — Z23 ENCOUNTER FOR IMMUNIZATION: ICD-10-CM

## 2024-12-26 DIAGNOSIS — Z12.31 ENCOUNTER FOR SCREENING MAMMOGRAM FOR BREAST CANCER: ICD-10-CM

## 2024-12-26 DIAGNOSIS — J44.9 CHRONIC OBSTRUCTIVE PULMONARY DISEASE, UNSPECIFIED COPD TYPE (HCC): Primary | ICD-10-CM

## 2024-12-26 DIAGNOSIS — K21.9 GASTROESOPHAGEAL REFLUX DISEASE WITHOUT ESOPHAGITIS: ICD-10-CM

## 2024-12-26 DIAGNOSIS — Z09 HOSPITAL DISCHARGE FOLLOW-UP: ICD-10-CM

## 2024-12-26 RX ORDER — PRAVASTATIN SODIUM 20 MG
20 TABLET ORAL EVERY EVENING
Qty: 90 TABLET | Refills: 3 | Status: SHIPPED | OUTPATIENT
Start: 2024-12-26

## 2024-12-26 RX ORDER — FLUTICASONE FUROATE, UMECLIDINIUM BROMIDE AND VILANTEROL TRIFENATATE 100; 62.5; 25 UG/1; UG/1; UG/1
1 POWDER RESPIRATORY (INHALATION) DAILY
Qty: 60 EACH | Refills: 5 | Status: SHIPPED | OUTPATIENT
Start: 2024-12-26

## 2024-12-26 RX ORDER — GUAIFENESIN 600 MG/1
600 TABLET, EXTENDED RELEASE ORAL 2 TIMES DAILY PRN
Qty: 60 TABLET | Refills: 3 | Status: SHIPPED | OUTPATIENT
Start: 2024-12-26

## 2024-12-26 NOTE — PROGRESS NOTES
Post-Discharge Transitional Care  Follow Up      Eugenie Estrada   YOB: 1970    Date of Office Visit:  12/26/2024  Date of Hospital Admission: 11/28/2024  Date of Hospital Discharge: 12/5/2024  Emergency visit 12/13/2024  Risk of hospital readmission (high >=14%. Medium >=10%) :Readmission Risk Score: 17.2      Care management risk score Rising risk (score 2-5) and Complex Care (Scores >=6): No Risk Score On File     Non face to face  following discharge, date last encounter closed (first attempt may have been earlier): *No documented post hospital discharge outreach found in the last 14 days    Call initiated 2 business days of discharge: *No response recorded in the last 14 days    ASSESSMENT/PLAN:   Chronic obstructive pulmonary disease, unspecified COPD type (HCC)  Chronic, worsening  Patient says she has never received either Spiriva or Symbicort  Will start Trelegy, new prescription sent to the pharmacy, advised to let me know if not covered by insurance  Will refill Mucinex  Advised nebulizer treatment every 4-6 hours as prescribed from the hospital, but attempt to cut down, 2 every 6-8 hours, albuterol inhaler as needed if going places    Smoking cessation counseling given, long counseling given to the patient, patient says she is continuing to work on smoking cessation, she smokes so little now that I told her that she can quit    Patient says she has made upcoming appointment with Dr. Velázquez on January 7, 2025  CT chest without contrast 12/2/2024 showing severe pattern of emphysema with fibrotic changes and bullous changes    -     AL DISCHARGE MEDS RECONCILED W/ CURRENT OUTPATIENT MED LIST  -     fluticasone-umeclidin-vilant (TRELEGY ELLIPTA) 100-62.5-25 MCG/ACT AEPB inhaler; Inhale 1 puff into the lungs daily, Disp-60 each, R-5Normal  -     guaiFENesin (MUCINEX) 600 MG extended release tablet; Take 1 tablet by mouth 2 times daily as needed for Congestion, Disp-60 tablet,

## 2024-12-26 NOTE — PROGRESS NOTES
Visit Information    Have you changed or started any medications since your last visit including any over-the-counter medicines, vitamins, or herbal medicines? no   Are you having any side effects from any of your medications? -  no  Have you stopped taking any of your medications? Is so, why? -  no    Have you seen any other physician or provider since your last visit? Yes - Records Obtained  Have you had any other diagnostic tests since your last visit? Yes - Records Obtained  Have you been seen in the emergency room and/or had an admission to a hospital since we last saw you? Yes - Records Obtained  Have you had your routine dental cleaning in the past 6 months?     Have you activated your [employer] account? If not, what are your barriers? Yes     Patient Care Team:  Amy Joseph MD as PCP - General  Amy Joseph MD as PCP - Empaneled Provider  Hugh Patterson MD as Surgeon (Orthopedic Surgery)  Bryce Castillo MD as Consulting Physician (Pulmonology)  Raj Bolaños MD as Consulting Physician (Pulmonary Disease)    Medical History Review  Past Medical, Family, and Social History reviewed and does contribute to the patient presenting condition    Health Maintenance   Topic Date Due    Hepatitis B vaccine (1 of 3 - 19+ 3-dose series) Never done    DTaP/Tdap/Td vaccine (1 - Tdap) Never done    Colorectal Cancer Screen  Never done    Pneumococcal 0-64 years Vaccine (2 of 2 - PCV) 09/29/2018    Shingles vaccine (1 of 2) Never done    Lipids  09/27/2022    Breast cancer screen  09/27/2023    Annual Wellness Visit (Medicare Advantage)  Never done    Flu vaccine (1) 08/01/2024    COVID-19 Vaccine (1 - 2023-24 season) Never done    Depression Monitoring  03/13/2025    Lung Cancer Screening &/or Counseling  12/02/2025    Hepatitis C screen  Completed    HIV screen  Completed    Hepatitis A vaccine  Aged Out    Hib vaccine  Aged Out    Polio vaccine  Aged Out    Meningococcal (ACWY) vaccine  Aged Out

## 2024-12-31 ENCOUNTER — TELEPHONE (OUTPATIENT)
Dept: GASTROENTEROLOGY | Age: 54
End: 2024-12-31

## 2024-12-31 ENCOUNTER — OFFICE VISIT (OUTPATIENT)
Dept: GASTROENTEROLOGY | Age: 54
End: 2024-12-31
Payer: COMMERCIAL

## 2024-12-31 VITALS
DIASTOLIC BLOOD PRESSURE: 73 MMHG | HEIGHT: 67 IN | OXYGEN SATURATION: 98 % | WEIGHT: 137 LBS | SYSTOLIC BLOOD PRESSURE: 101 MMHG | BODY MASS INDEX: 21.5 KG/M2 | RESPIRATION RATE: 20 BRPM | HEART RATE: 101 BPM | TEMPERATURE: 98.2 F

## 2024-12-31 DIAGNOSIS — K59.00 CONSTIPATION, UNSPECIFIED CONSTIPATION TYPE: ICD-10-CM

## 2024-12-31 DIAGNOSIS — Z12.11 COLON CANCER SCREENING: ICD-10-CM

## 2024-12-31 DIAGNOSIS — D64.9 ANEMIA, UNSPECIFIED TYPE: Primary | ICD-10-CM

## 2024-12-31 DIAGNOSIS — R10.13 DYSPEPSIA: ICD-10-CM

## 2024-12-31 PROCEDURE — G8427 DOCREV CUR MEDS BY ELIG CLIN: HCPCS | Performed by: INTERNAL MEDICINE

## 2024-12-31 PROCEDURE — 1111F DSCHRG MED/CURRENT MED MERGE: CPT | Performed by: INTERNAL MEDICINE

## 2024-12-31 PROCEDURE — 4004F PT TOBACCO SCREEN RCVD TLK: CPT | Performed by: INTERNAL MEDICINE

## 2024-12-31 PROCEDURE — G8484 FLU IMMUNIZE NO ADMIN: HCPCS | Performed by: INTERNAL MEDICINE

## 2024-12-31 PROCEDURE — G8420 CALC BMI NORM PARAMETERS: HCPCS | Performed by: INTERNAL MEDICINE

## 2024-12-31 PROCEDURE — 99204 OFFICE O/P NEW MOD 45 MIN: CPT | Performed by: INTERNAL MEDICINE

## 2024-12-31 PROCEDURE — G2211 COMPLEX E/M VISIT ADD ON: HCPCS | Performed by: INTERNAL MEDICINE

## 2024-12-31 PROCEDURE — 3017F COLORECTAL CA SCREEN DOC REV: CPT | Performed by: INTERNAL MEDICINE

## 2024-12-31 RX ORDER — POLYETHYLENE GLYCOL 3350 17 G/17G
17 POWDER, FOR SOLUTION ORAL DAILY
Qty: 1530 G | Refills: 1 | Status: SHIPPED | OUTPATIENT
Start: 2024-12-31 | End: 2025-01-30

## 2024-12-31 ASSESSMENT — ENCOUNTER SYMPTOMS
SORE THROAT: 0
SHORTNESS OF BREATH: 1
NAUSEA: 0
COLOR CHANGE: 0
CHOKING: 0
TROUBLE SWALLOWING: 1
VOMITING: 0
RECTAL PAIN: 1
VOICE CHANGE: 0
CONSTIPATION: 1
ANAL BLEEDING: 0
DIARRHEA: 0
WHEEZING: 0
ABDOMINAL DISTENTION: 1
BLOOD IN STOOL: 0
COUGH: 1
ABDOMINAL PAIN: 1

## 2024-12-31 NOTE — TELEPHONE ENCOUNTER
Pt saw Dr. Lau today in clinic. EGD/Colonoscopy ordered. Pt will need pulmonary clearance for oxygen prior to scheduling procedure. Pt said her Pulmonologist is at [facility]. Pt unsure the name of her doctor; will call the office back with doctor's name.

## 2024-12-31 NOTE — PROGRESS NOTES
Reason for Referral:       No referring provider defined for this encounter.    Chief Complaint   Patient presents with    New Patient    Anemia           HISTORY OF PRESENT ILLNESS: Ms.Joann ENEDELIA Estrada is a 54 y.o. female with a past history remarkable for anxiety, allergic rhinitis, asthma, COPD, PTSD,, referred for evaluation of anemia.  Noted to have a hemoglobin in 10-11.  The RDW is also elevated.  Previously BUN/creatinine ratio was also elevated.  The patient denies any signs of active GI bleeding.  She does endorse having some dyspepsia but she does not take any medication for this.  She has never had EGD before.  She also never had a screening colonoscopy before.  She was recently admitted to hospital with COPD exacerbation and was discharged with home oxygen at 2 L.  She reports having constipation for a long time.  She cannot have a bowel movement for days to weeks.  She has previously tried some laxatives without much relief.      Previous Endoscopies    No prior EGD or colonoscopy    Previous GI workup       Past Medical,Family, and Social History reviewed and does not contribute to the patient presentingcondition.    Patient's PMH/PSH,SH,PSYCH Hx, MEDs, ALLERGIES, and ROS were all reviewed and updated in the appropriate sections.    PAST MEDICAL HISTORY:  Past Medical History:   Diagnosis Date    Acute bronchitis with bronchospasm 9/28/2016    Allergic rhinitis 9/28/2016    Anxiety     Anxiety and depression     Apical lung scarring     Right    Asthma     exercized induced    Borderline personality disorder 12/3/2014    Chronic abdominal pain 7/7/2018    Chronic fatigue 6/24/2017    COPD, mild (HCC)     Emphysema of lung (HCC)     Severe    History of bronchitis     History of chest pain     History of pneumocystis pneumonia 9/28/2016    History of suicide attempt     Multiple admissions to [facility] for suicide attempts    Hyperglycemia 6/24/2017    Moderate episode of recurrent

## 2025-01-02 PROBLEM — J96.11 CHRONIC RESPIRATORY FAILURE WITH HYPOXIA: Status: ACTIVE | Noted: 2025-01-02

## 2025-01-02 PROBLEM — J44.9 CHRONIC OBSTRUCTIVE PULMONARY DISEASE (HCC): Status: ACTIVE | Noted: 2024-09-10

## 2025-01-02 PROBLEM — I31.39 PERICARDIAL EFFUSION: Status: ACTIVE | Noted: 2025-01-02

## 2025-01-02 PROBLEM — D64.9 ANEMIA: Status: ACTIVE | Noted: 2025-01-02

## 2025-01-02 PROBLEM — E78.2 MIXED HYPERLIPIDEMIA: Status: ACTIVE | Noted: 2021-09-27

## 2025-01-02 PROBLEM — K21.9 GASTROESOPHAGEAL REFLUX DISEASE WITHOUT ESOPHAGITIS: Status: ACTIVE | Noted: 2025-01-02

## 2025-01-02 ASSESSMENT — ENCOUNTER SYMPTOMS: BLOOD IN STOOL: 0

## 2025-01-04 ENCOUNTER — HOSPITAL ENCOUNTER (OUTPATIENT)
Age: 55
Discharge: HOME | End: 2025-01-04
Payer: COMMERCIAL

## 2025-01-04 DIAGNOSIS — E78.2 MIXED HYPERLIPIDEMIA: ICD-10-CM

## 2025-01-04 DIAGNOSIS — D64.9 ANEMIA, UNSPECIFIED TYPE: ICD-10-CM

## 2025-01-04 DIAGNOSIS — R73.9 HYPERGLYCEMIA: ICD-10-CM

## 2025-01-04 DIAGNOSIS — I31.39 PERICARDIAL EFFUSION: ICD-10-CM

## 2025-01-04 LAB
ALBUMIN PERCENT: NORMAL %
ALBUMIN SERPL-MCNC: NORMAL G/DL
ALPHA 2 PERCENT: NORMAL %
ALPHA1 GLOB SERPL ELPH-MCNC: NORMAL %
ALPHA1 GLOB SERPL ELPH-MCNC: NORMAL G/DL
ALPHA2 GLOB SERPL ELPH-MCNC: NORMAL G/DL
B-GLOBULIN SERPL ELPH-MCNC: NORMAL %
B-GLOBULIN SERPL ELPH-MCNC: NORMAL G/DL
BASOPHILS # BLD: 0 K/UL (ref 0–0.2)
BASOPHILS NFR BLD: 1 % (ref 0–2)
BILIRUB UR QL STRIP: NEGATIVE
CHOLEST SERPL-MCNC: 211 MG/DL (ref 0–199)
CHOLESTEROL/HDL RATIO: 4.1
CLARITY UR: CLEAR
COLOR UR: YELLOW
EOSINOPHIL # BLD: 0 K/UL (ref 0–0.4)
EOSINOPHILS RELATIVE PERCENT: 0 % (ref 0–4)
ERYTHROCYTE [DISTWIDTH] IN BLOOD BY AUTOMATED COUNT: 17.1 % (ref 11.5–14.9)
EST. AVERAGE GLUCOSE BLD GHB EST-MCNC: 108 MG/DL
FOLATE SERPL-MCNC: 5.5 NG/ML (ref 4.8–24.2)
GAMMA GLOB SERPL ELPH-MCNC: NORMAL G/DL
GAMMA GLOBULIN %: NORMAL %
GLUCOSE UR STRIP-MCNC: NEGATIVE MG/DL
HBA1C MFR BLD: 5.4 % (ref 4–6)
HCT VFR BLD AUTO: 36.9 % (ref 36–46)
HDLC SERPL-MCNC: 51 MG/DL
HGB BLD-MCNC: 12 G/DL (ref 12–16)
HGB UR QL STRIP.AUTO: NEGATIVE
IMM RETICS NFR: 11.5 % (ref 2.7–18.3)
IRON SATN MFR SERPL: 21 % (ref 20–55)
IRON SERPL-MCNC: 63 UG/DL (ref 37–145)
KETONES UR STRIP-MCNC: NEGATIVE MG/DL
LDLC SERPL CALC-MCNC: 124 MG/DL (ref 0–100)
LEUKOCYTE ESTERASE UR QL STRIP: NEGATIVE
LYMPHOCYTES NFR BLD: 1.2 K/UL (ref 1–4.8)
LYMPHOCYTES RELATIVE PERCENT: 27 % (ref 24–44)
Lab: NORMAL
M PROTEIN 2 SERPL ELPH-MCNC: NORMAL G/DL
M PROTEIN SERPL ELPH-MCNC: NORMAL G/DL
M SPIKE 1, URINE: NORMAL G/DL
M SPIKE 2, URINE: NORMAL G/DL
M-SPIKE 1,%: NORMAL %
M-SPIKE 2 %: NORMAL %
MCH RBC QN AUTO: 29.6 PG (ref 26–34)
MCHC RBC AUTO-ENTMCNC: 32.6 G/DL (ref 31–37)
MCV RBC AUTO: 90.9 FL (ref 80–100)
MONOCYTES NFR BLD: 0.4 K/UL (ref 0.1–1.3)
MONOCYTES NFR BLD: 9 % (ref 1–7)
NEUTROPHILS NFR BLD: 63 % (ref 36–66)
NEUTS SEG NFR BLD: 2.9 K/UL (ref 1.3–9.1)
NITRITE UR QL STRIP: NEGATIVE
P E INTERPRETATION, U: NORMAL
PATHOLOGIST: NORMAL
PATHOLOGIST: NORMAL
PH UR STRIP: 7 [PH] (ref 5–8)
PLATELET # BLD AUTO: 313 K/UL (ref 150–450)
PMV BLD AUTO: 6.7 FL (ref 6–12)
PROT PATTERN SERPL ELPH-IMP: NORMAL
PROT SERPL-MCNC: 7 G/DL (ref 6.6–8.7)
PROT UR STRIP-MCNC: NEGATIVE MG/DL
RBC # BLD AUTO: 4.06 M/UL (ref 4–5.2)
RETIC HEMOGLOBIN: 31.4 PG (ref 28.2–35.7)
RETICS # AUTO: 0.08 M/UL (ref 0.03–0.08)
RETICS/RBC NFR AUTO: 2 % (ref 0.5–1.9)
SP GR UR STRIP: 1.02 (ref 1–1.03)
SPECIMEN TYPE: NORMAL
TIBC SERPL-MCNC: 296 UG/DL (ref 250–450)
TOTAL PROT. SUM,%: NORMAL %
TOTAL PROT. SUM: NORMAL G/DL
TRIGL SERPL-MCNC: 182 MG/DL (ref 0–149)
UNSATURATED IRON BINDING CAPACITY: 233 UG/DL (ref 112–347)
URINE TOTAL PROTEIN: 10 MG/DL
UROBILINOGEN UR STRIP-ACNC: NORMAL EU/DL (ref 0–1)
VIT B12 SERPL-MCNC: 296 PG/ML (ref 232–1245)
VOLUME: NORMAL
WBC OTHER # BLD: 4.6 K/UL (ref 3.5–11)

## 2025-01-04 PROCEDURE — 85025 COMPLETE CBC W/AUTO DIFF WBC: CPT

## 2025-01-04 PROCEDURE — 82746 ASSAY OF FOLIC ACID SERUM: CPT

## 2025-01-04 PROCEDURE — 84166 PROTEIN E-PHORESIS/URINE/CSF: CPT

## 2025-01-04 PROCEDURE — 81003 URINALYSIS AUTO W/O SCOPE: CPT

## 2025-01-04 PROCEDURE — 86038 ANTINUCLEAR ANTIBODIES: CPT

## 2025-01-04 PROCEDURE — 84155 ASSAY OF PROTEIN SERUM: CPT

## 2025-01-04 PROCEDURE — 36415 COLL VENOUS BLD VENIPUNCTURE: CPT

## 2025-01-04 PROCEDURE — 82607 VITAMIN B-12: CPT

## 2025-01-04 PROCEDURE — 86225 DNA ANTIBODY NATIVE: CPT

## 2025-01-04 PROCEDURE — 83540 ASSAY OF IRON: CPT

## 2025-01-04 PROCEDURE — 84156 ASSAY OF PROTEIN URINE: CPT

## 2025-01-04 PROCEDURE — 84165 PROTEIN E-PHORESIS SERUM: CPT

## 2025-01-04 PROCEDURE — 85045 AUTOMATED RETICULOCYTE COUNT: CPT

## 2025-01-04 PROCEDURE — 83036 HEMOGLOBIN GLYCOSYLATED A1C: CPT

## 2025-01-04 PROCEDURE — 83550 IRON BINDING TEST: CPT

## 2025-01-04 PROCEDURE — 80061 LIPID PANEL: CPT

## 2025-01-06 DIAGNOSIS — E53.8 VITAMIN B 12 DEFICIENCY: Primary | ICD-10-CM

## 2025-01-06 LAB
ALBUMIN PERCENT: 58 % (ref 45–65)
ALBUMIN SERPL-MCNC: 4.1 G/DL (ref 3.2–5.2)
ALPHA 2 PERCENT: 14 % (ref 6–13)
ALPHA1 GLOB SERPL ELPH-MCNC: 0.4 G/DL (ref 0.1–0.4)
ALPHA1 GLOB SERPL ELPH-MCNC: 5 % (ref 3–6)
ALPHA2 GLOB SERPL ELPH-MCNC: 1 G/DL (ref 0.5–0.9)
ANA SER QL IA: NEGATIVE
B-GLOBULIN SERPL ELPH-MCNC: 0.8 G/DL (ref 0.5–1.1)
B-GLOBULIN SERPL ELPH-MCNC: 12 % (ref 11–19)
DSDNA IGG SER QL IA: 1.3 IU/ML
GAMMA GLOB SERPL ELPH-MCNC: 0.8 G/DL (ref 0.5–1.5)
GAMMA GLOBULIN %: 11 % (ref 9–20)
NUCLEAR IGG SER IA-RTO: 0.3 U/ML
P E INTERPRETATION, U: NORMAL
PATH REV BLD -IMP: NORMAL
PATHOLOGIST: ABNORMAL
PROT PATTERN SERPL ELPH-IMP: ABNORMAL
PROT SERPL-MCNC: 7 G/DL (ref 6.6–8.7)
SPECIMEN TYPE: NORMAL
SURGICAL PATHOLOGY REPORT: NORMAL
TOTAL PROT. SUM,%: 100 % (ref 98–102)
TOTAL PROT. SUM: 7.1 G/DL (ref 6.3–8.2)
URINE TOTAL PROTEIN: 10 MG/DL

## 2025-01-08 ENCOUNTER — APPOINTMENT (OUTPATIENT)
Dept: CT IMAGING | Age: 55
End: 2025-01-08
Payer: COMMERCIAL

## 2025-01-08 ENCOUNTER — HOSPITAL ENCOUNTER (EMERGENCY)
Age: 55
Discharge: HOME | End: 2025-01-08
Attending: EMERGENCY MEDICINE
Payer: COMMERCIAL

## 2025-01-08 VITALS
RESPIRATION RATE: 15 BRPM | TEMPERATURE: 97.2 F | BODY MASS INDEX: 21.97 KG/M2 | OXYGEN SATURATION: 95 % | HEIGHT: 67 IN | HEART RATE: 96 BPM | SYSTOLIC BLOOD PRESSURE: 106 MMHG | DIASTOLIC BLOOD PRESSURE: 77 MMHG | WEIGHT: 140 LBS

## 2025-01-08 DIAGNOSIS — R07.9 CHEST PAIN, UNSPECIFIED TYPE: Primary | ICD-10-CM

## 2025-01-08 LAB
ALBUMIN SERPL-MCNC: 4.1 G/DL (ref 3.5–5.2)
ALP SERPL-CCNC: 100 U/L (ref 35–104)
ALT SERPL-CCNC: 15 U/L (ref 10–35)
ANION GAP SERPL CALCULATED.3IONS-SCNC: 12 MMOL/L (ref 9–16)
AST SERPL-CCNC: 22 U/L (ref 10–35)
BASOPHILS # BLD: 0 K/UL (ref 0–0.2)
BASOPHILS NFR BLD: 1 % (ref 0–2)
BILIRUB DIRECT SERPL-MCNC: <0.1 MG/DL (ref 0–0.3)
BILIRUB INDIRECT SERPL-MCNC: NORMAL MG/DL (ref 0–1)
BILIRUB SERPL-MCNC: <0.2 MG/DL (ref 0–1.2)
BUN SERPL-MCNC: 19 MG/DL (ref 6–20)
CALCIUM SERPL-MCNC: 9.2 MG/DL (ref 8.6–10.4)
CHLORIDE SERPL-SCNC: 103 MMOL/L (ref 98–107)
CO2 SERPL-SCNC: 22 MMOL/L (ref 20–31)
CREAT SERPL-MCNC: 1.2 MG/DL (ref 0.7–1.2)
EOSINOPHIL # BLD: 0 K/UL (ref 0–0.4)
EOSINOPHILS RELATIVE PERCENT: 0 % (ref 0–4)
ERYTHROCYTE [DISTWIDTH] IN BLOOD BY AUTOMATED COUNT: 16.3 % (ref 11.5–14.9)
GFR, ESTIMATED: 54 ML/MIN/1.73M2
GLUCOSE SERPL-MCNC: 104 MG/DL (ref 74–99)
HCT VFR BLD AUTO: 34.1 % (ref 36–46)
HGB BLD-MCNC: 11.8 G/DL (ref 12–16)
LIPASE SERPL-CCNC: 48 U/L (ref 13–60)
LYMPHOCYTES NFR BLD: 1.7 K/UL (ref 1–4.8)
LYMPHOCYTES RELATIVE PERCENT: 32 % (ref 24–44)
MCH RBC QN AUTO: 30.5 PG (ref 26–34)
MCHC RBC AUTO-ENTMCNC: 34.5 G/DL (ref 31–37)
MCV RBC AUTO: 88.5 FL (ref 80–100)
MONOCYTES NFR BLD: 0.6 K/UL (ref 0.1–1.3)
MONOCYTES NFR BLD: 11 % (ref 1–7)
NEUTROPHILS NFR BLD: 56 % (ref 36–66)
NEUTS SEG NFR BLD: 3 K/UL (ref 1.3–9.1)
PLATELET # BLD AUTO: 329 K/UL (ref 150–450)
PMV BLD AUTO: 7 FL (ref 6–12)
POTASSIUM SERPL-SCNC: 4.5 MMOL/L (ref 3.7–5.3)
PROT SERPL-MCNC: 7.2 G/DL (ref 6.6–8.7)
RBC # BLD AUTO: 3.85 M/UL (ref 4–5.2)
SODIUM SERPL-SCNC: 137 MMOL/L (ref 136–145)
TROPONIN I SERPL HS-MCNC: 8 NG/L (ref 0–14)
TROPONIN I SERPL HS-MCNC: 8 NG/L (ref 0–14)
WBC OTHER # BLD: 5.4 K/UL (ref 3.5–11)

## 2025-01-08 PROCEDURE — 93005 ELECTROCARDIOGRAM TRACING: CPT

## 2025-01-08 PROCEDURE — 2500000003 HC RX 250 WO HCPCS

## 2025-01-08 PROCEDURE — 6360000004 HC RX CONTRAST MEDICATION

## 2025-01-08 PROCEDURE — 74174 CTA ABD&PLVS W/CONTRAST: CPT

## 2025-01-08 PROCEDURE — 85025 COMPLETE CBC W/AUTO DIFF WBC: CPT

## 2025-01-08 PROCEDURE — 84484 ASSAY OF TROPONIN QUANT: CPT

## 2025-01-08 PROCEDURE — 99285 EMERGENCY DEPT VISIT HI MDM: CPT

## 2025-01-08 PROCEDURE — 6360000002 HC RX W HCPCS

## 2025-01-08 PROCEDURE — 83690 ASSAY OF LIPASE: CPT

## 2025-01-08 PROCEDURE — 2580000003 HC RX 258

## 2025-01-08 PROCEDURE — 96374 THER/PROPH/DIAG INJ IV PUSH: CPT

## 2025-01-08 PROCEDURE — 36415 COLL VENOUS BLD VENIPUNCTURE: CPT

## 2025-01-08 PROCEDURE — 80048 BASIC METABOLIC PNL TOTAL CA: CPT

## 2025-01-08 PROCEDURE — 96375 TX/PRO/DX INJ NEW DRUG ADDON: CPT

## 2025-01-08 PROCEDURE — 80076 HEPATIC FUNCTION PANEL: CPT

## 2025-01-08 RX ORDER — IOPAMIDOL 755 MG/ML
100 INJECTION, SOLUTION INTRAVASCULAR
Status: COMPLETED | OUTPATIENT
Start: 2025-01-08 | End: 2025-01-08

## 2025-01-08 RX ORDER — FENTANYL CITRATE 0.05 MG/ML
50 INJECTION, SOLUTION INTRAMUSCULAR; INTRAVENOUS ONCE
Status: COMPLETED | OUTPATIENT
Start: 2025-01-08 | End: 2025-01-08

## 2025-01-08 RX ORDER — 0.9 % SODIUM CHLORIDE 0.9 %
100 INTRAVENOUS SOLUTION INTRAVENOUS ONCE
Status: COMPLETED | OUTPATIENT
Start: 2025-01-08 | End: 2025-01-08

## 2025-01-08 RX ORDER — SODIUM CHLORIDE 0.9 % (FLUSH) 0.9 %
10 SYRINGE (ML) INJECTION PRN
Status: DISCONTINUED | OUTPATIENT
Start: 2025-01-08 | End: 2025-01-08 | Stop reason: HOSPADM

## 2025-01-08 RX ORDER — KETOROLAC TROMETHAMINE 30 MG/ML
30 INJECTION, SOLUTION INTRAMUSCULAR; INTRAVENOUS ONCE
Status: COMPLETED | OUTPATIENT
Start: 2025-01-08 | End: 2025-01-08

## 2025-01-08 RX ADMIN — SODIUM CHLORIDE, PRESERVATIVE FREE 10 ML: 5 INJECTION INTRAVENOUS at 19:56

## 2025-01-08 RX ADMIN — KETOROLAC TROMETHAMINE 30 MG: 30 INJECTION, SOLUTION INTRAMUSCULAR at 20:46

## 2025-01-08 RX ADMIN — SODIUM CHLORIDE 100 ML: 9 INJECTION, SOLUTION INTRAVENOUS at 19:58

## 2025-01-08 RX ADMIN — FAMOTIDINE 20 MG: 10 INJECTION, SOLUTION INTRAVENOUS at 19:16

## 2025-01-08 RX ADMIN — FENTANYL CITRATE 50 MCG: 0.05 INJECTION, SOLUTION INTRAMUSCULAR; INTRAVENOUS at 19:16

## 2025-01-08 RX ADMIN — IOPAMIDOL 100 ML: 755 INJECTION, SOLUTION INTRAVENOUS at 19:56

## 2025-01-08 ASSESSMENT — PAIN SCALES - GENERAL
PAINLEVEL_OUTOF10: 6
PAINLEVEL_OUTOF10: 9
PAINLEVEL_OUTOF10: 7

## 2025-01-08 ASSESSMENT — ENCOUNTER SYMPTOMS
ABDOMINAL PAIN: 1
SHORTNESS OF BREATH: 0
VOMITING: 0
CONSTIPATION: 0
NAUSEA: 1

## 2025-01-08 ASSESSMENT — PAIN - FUNCTIONAL ASSESSMENT: PAIN_FUNCTIONAL_ASSESSMENT: 0-10

## 2025-01-08 ASSESSMENT — HEART SCORE
ECG: NORMAL
ECG: NORMAL

## 2025-01-08 NOTE — ED PROVIDER NOTES
[facility] EMERGENCY DEPARTMENT  eMERGENCY dEPARTMENT eNCOUnter   Attending Attestation     Pt Name: Eugenie Estrada  MRN: [id number]  Birthdate 1970  Date of evaluation: 25       Eugenie Estrada is a 54 y.o. female who presents with Chest Pain (Chest pain 20 min pta while watching tv. Pt states midsternal/epigastric area that goes through to her back. Pt c/o sob. Pt had recent admission for copd and was placed on home O2 at 2L. Pt is currently 95% on room air. )      History:   54-year-old female presenting to the ER complaining of epigastric pain that radiates to the back.    Exam: Vitals:   Vitals:    25 1859 25 1902   BP: (!) 133/93    Pulse: (!) 108    Resp: 24    Temp:  97.2 °F (36.2 °C)   TempSrc:  Oral   SpO2: 96%    Weight: 63.5 kg (140 lb)    Height: 1.702 m (5' 7\")      Patient with CP that radiates to the back. Cardiac workup in the ER did not display positive signs of acute cardiac ischemia. EKG was reviewed and interpreted by myself, the ED physician. CT imaging did not display positive signs of acute pathology. Patient was instructed to follow up with PCP and to return to ER if symptoms worsen or change. Patient understands and agrees with the plan.     History: 0  EC  Patient Age: 3  *Risk factors for Atherosclerotic disease: Cigarette smoking; Hypercholesterolemia  Risk Factors: 1  Troponin: 0  Heart Score Total: 2        I performed a history and physical examination of the patient and discussed management with the resident. I reviewed the resident’s note and agree with the documented findings and plan of care. Any areas of disagreement are noted on the chart. I was personally present for the key portions of any procedures. I have documented in the chart those procedures where I was not present during the key portions. I have personally reviewed all images and agree with the resident's interpretation. I have reviewed the emergency nurses triage note. I agree with the chief

## 2025-01-09 NOTE — ED PROVIDER NOTES
[facility] EMERGENCY DEPARTMENT  Emergency Department Encounter  Emergency Medicine Resident     Pt Name:Eugenie Estrada  MRN: [id number]  Birthdate 1970  Date of evaluation: 1/8/25  PCP:  Amy Joseph MD  Note Started: 7:14 PM EST      CHIEF COMPLAINT       Chief Complaint   Patient presents with    Chest Pain     Chest pain 20 min pta while watching tv. Pt states midsternal/epigastric area that goes through to her back. Pt c/o sob. Pt had recent admission for copd and was placed on home O2 at 2L. Pt is currently 95% on room air.        HISTORY OF PRESENT ILLNESS  (Location/Symptom, Timing/Onset, Context/Setting, Quality, Duration, Modifying Factors, Severity.)      Eugenie Estrada is a 54 y.o. female with past medical history of COPD on 1.5 to 2 L of oxygen at baseline who presents with centralized sharp chest pain in the lower part of her chest/epigastric region that radiates directly through to her back.  She states that it started abruptly approximately 20 minutes ago.  Patient reports that it feels as though somebody is punching through her low chest/upper abdomen and \"grabbing her spine.\"  She states that she has had this pain 1 time in the past approximately 2 weeks ago that occurred during the middle of the night while she was sleeping.  She states that at that time it lasted for approximately 20 minutes before resolving.  Patient has not seen a doctor yet for this pain.  She reports that the pain today started while she was sitting and watching TV.  She denies that she had eaten anything recently before it started.  She reports nausea but denies any abdominal pain.  She denies feeling short of breath.  No recent cold-like symptoms.  Patient denies any history of blood clot.  Patient states that she does not have any cardiac history that she knows of.    PAST MEDICAL / SURGICAL / SOCIAL / FAMILY HISTORY      has a past medical history of Acute bronchitis with bronchospasm, Allergic

## 2025-01-09 NOTE — DISCHARGE INSTRUCTIONS
Thank you for visiting [facility] Emergency Department.    Please follow-up with your primary care doctor as well as the cardiologist listed below.  They may choose to do additional heart workup outpatient.  Your heart workup here was normal.  No signs of blood clots in your lungs.  The big vessels in your chest and abdomen look okay.  No signs of a heart attack.    You need to call Amy Joseph MD to make an appointment as directed for follow up.    Should you have any questions regarding your care or further treatment, please call [facility] Emergency Department at [phone].

## 2025-01-09 NOTE — ED NOTES
Pt states when she was discharged with her home O2 she was told only to wear it with activity and did not need it while lying or sitting down. Pt is currently requesting to wear her 2L O2. Pt is 97% on room air. 2L O2 via NC applied due to pt request.

## 2025-01-10 LAB
EKG ATRIAL RATE: 101 BPM
EKG P AXIS: 65 DEGREES
EKG P-R INTERVAL: 154 MS
EKG Q-T INTERVAL: 346 MS
EKG QRS DURATION: 70 MS
EKG QTC CALCULATION (BAZETT): 448 MS
EKG R AXIS: 64 DEGREES
EKG T AXIS: 52 DEGREES
EKG VENTRICULAR RATE: 101 BPM

## 2025-01-10 PROCEDURE — 93010 ELECTROCARDIOGRAM REPORT: CPT | Performed by: INTERNAL MEDICINE

## 2025-01-18 NOTE — TELEPHONE ENCOUNTER
[facility] ED Follow up Call            FU appts/Provider:    No future appointments. VOICEMAIL DOCUMENTATION - ERASE IF NOT USED  Hi, this message is for  India Khan  This is Aviva from [address] [place] [address] office. Just calling to see how you are doing after your recent visit to the Emergency Room. [address] [place] [address] wants to make sure you were able to fill any prescriptions and that you understand your discharge instructions. Please return our call if you need to make a follow up appointment with your provider or have any further needs. Our phone number is [phone]. Have a great day.
English

## 2025-02-04 ENCOUNTER — TELEPHONE (OUTPATIENT)
Dept: GASTROENTEROLOGY | Age: 55
End: 2025-02-04

## 2025-03-12 ENCOUNTER — HOSPITAL ENCOUNTER (OUTPATIENT)
Age: 55
Discharge: HOME | End: 2025-03-14
Attending: FAMILY MEDICINE
Payer: COMMERCIAL

## 2025-03-12 ENCOUNTER — HOSPITAL ENCOUNTER (OUTPATIENT)
Dept: WOMENS IMAGING | Age: 55
Discharge: HOME | End: 2025-03-14
Attending: FAMILY MEDICINE
Payer: COMMERCIAL

## 2025-03-12 ENCOUNTER — RESULTS FOLLOW-UP (OUTPATIENT)
Dept: WOMENS IMAGING | Age: 55
End: 2025-03-12

## 2025-03-12 VITALS
DIASTOLIC BLOOD PRESSURE: 86 MMHG | WEIGHT: 140 LBS | BODY MASS INDEX: 21.97 KG/M2 | HEART RATE: 106 BPM | SYSTOLIC BLOOD PRESSURE: 113 MMHG | HEIGHT: 67 IN

## 2025-03-12 DIAGNOSIS — I31.39 PERICARDIAL EFFUSION: ICD-10-CM

## 2025-03-12 DIAGNOSIS — Z12.31 ENCOUNTER FOR SCREENING MAMMOGRAM FOR BREAST CANCER: ICD-10-CM

## 2025-03-12 PROCEDURE — 93306 TTE W/DOPPLER COMPLETE: CPT

## 2025-03-12 PROCEDURE — 77063 BREAST TOMOSYNTHESIS BI: CPT

## 2025-03-12 PROCEDURE — 93306 TTE W/DOPPLER COMPLETE: CPT | Performed by: INTERNAL MEDICINE

## 2025-03-12 NOTE — PROGRESS NOTES
Echo completed as outpatient on the 12th of March. Patient complained of chest pain during the study. I recommended that she go to the ER to get checked out. Patient replied stating that she has been to the ER for this chest pain before and she stated they could not do anything for it. Patient did not want to go to the ER. I told patient if any symptoms were to progress or become new, for example CP, SOB, etc. to go the ER immediately to get checked out.

## 2025-03-13 LAB
ECHO AO ASC DIAM: 2.6 CM
ECHO AO ASCENDING AORTA INDEX: 1.49 CM/M2
ECHO AO ROOT DIAM: 2.9 CM
ECHO AO ROOT INDEX: 1.67 CM/M2
ECHO AV AREA PEAK VELOCITY: 2.4 CM2
ECHO AV AREA VTI: 2.2 CM2
ECHO AV AREA/BSA PEAK VELOCITY: 1.4 CM2/M2
ECHO AV AREA/BSA VTI: 1.3 CM2/M2
ECHO AV MEAN GRADIENT: 2 MMHG
ECHO AV MEAN VELOCITY: 0.7 M/S
ECHO AV PEAK GRADIENT: 4 MMHG
ECHO AV PEAK VELOCITY: 1 M/S
ECHO AV VELOCITY RATIO: 0.8
ECHO AV VTI: 18.6 CM
ECHO BSA: 1.73 M2
ECHO LA AREA 2C: 8 CM2
ECHO LA AREA 4C: 10.6 CM2
ECHO LA DIAMETER INDEX: 1.61 CM/M2
ECHO LA DIAMETER: 2.8 CM
ECHO LA MAJOR AXIS: 4.3 CM
ECHO LA MINOR AXIS: 3.9 CM
ECHO LA TO AORTIC ROOT RATIO: 0.97
ECHO LA VOL BP: 18 ML (ref 22–52)
ECHO LA VOL MOD A2C: 14 ML (ref 22–52)
ECHO LA VOL MOD A4C: 21 ML (ref 22–52)
ECHO LA VOL/BSA BIPLANE: 10 ML/M2 (ref 16–34)
ECHO LA VOLUME INDEX MOD A2C: 8 ML/M2 (ref 16–34)
ECHO LA VOLUME INDEX MOD A4C: 12 ML/M2 (ref 16–34)
ECHO LV E' LATERAL VELOCITY: 7.72 CM/S
ECHO LV E' SEPTAL VELOCITY: 6.64 CM/S
ECHO LV EDV A2C: 32 ML
ECHO LV EDV A4C: 42 ML
ECHO LV EDV INDEX A4C: 24 ML/M2
ECHO LV EDV NDEX A2C: 18 ML/M2
ECHO LV EF PHYSICIAN: 55 %
ECHO LV EJECTION FRACTION A2C: 66 %
ECHO LV EJECTION FRACTION A4C: 63 %
ECHO LV EJECTION FRACTION BIPLANE: 64 % (ref 55–100)
ECHO LV ESV A2C: 11 ML
ECHO LV ESV A4C: 16 ML
ECHO LV ESV INDEX A2C: 6 ML/M2
ECHO LV ESV INDEX A4C: 9 ML/M2
ECHO LV FRACTIONAL SHORTENING: 18 % (ref 28–44)
ECHO LV INTERNAL DIMENSION DIASTOLE INDEX: 1.95 CM/M2
ECHO LV INTERNAL DIMENSION DIASTOLIC: 3.4 CM (ref 3.9–5.3)
ECHO LV INTERNAL DIMENSION SYSTOLIC INDEX: 1.61 CM/M2
ECHO LV INTERNAL DIMENSION SYSTOLIC: 2.8 CM
ECHO LV IVSD: 0.9 CM (ref 0.6–0.9)
ECHO LV MASS 2D: 84.9 G (ref 67–162)
ECHO LV MASS INDEX 2D: 48.8 G/M2 (ref 43–95)
ECHO LV POSTERIOR WALL DIASTOLIC: 0.9 CM (ref 0.6–0.9)
ECHO LV RELATIVE WALL THICKNESS RATIO: 0.53
ECHO LVOT AREA: 2.8 CM2
ECHO LVOT AV VTI INDEX: 0.79
ECHO LVOT DIAM: 1.9 CM
ECHO LVOT MEAN GRADIENT: 1 MMHG
ECHO LVOT PEAK GRADIENT: 3 MMHG
ECHO LVOT PEAK VELOCITY: 0.8 M/S
ECHO LVOT STROKE VOLUME INDEX: 23.9 ML/M2
ECHO LVOT SV: 41.7 ML
ECHO LVOT VTI: 14.7 CM
ECHO MV A VELOCITY: 0.72 M/S
ECHO MV AREA VTI: 2.7 CM2
ECHO MV E DECELERATION TIME (DT): 99 MS
ECHO MV E VELOCITY: 0.63 M/S
ECHO MV E/A RATIO: 0.88
ECHO MV E/E' LATERAL: 8.16
ECHO MV E/E' RATIO (AVERAGED): 8.82
ECHO MV E/E' SEPTAL: 9.49
ECHO MV LVOT VTI INDEX: 1.03
ECHO MV MAX VELOCITY: 0.7 M/S
ECHO MV MEAN GRADIENT: 1 MMHG
ECHO MV MEAN VELOCITY: 0.5 M/S
ECHO MV PEAK GRADIENT: 2 MMHG
ECHO MV VTI: 15.2 CM
ECHO RA AREA 4C: 7.5 CM2
ECHO RA END SYSTOLIC VOLUME APICAL 4 CHAMBER INDEX BSA: 7 ML/M2
ECHO RA VOLUME: 13 ML
ECHO RV BASAL DIMENSION: 2.3 CM
ECHO RV TAPSE: 1.3 CM (ref 1.7–?)

## 2025-03-14 ENCOUNTER — RESULTS FOLLOW-UP (OUTPATIENT)
Age: 55
End: 2025-03-14

## 2025-03-14 DIAGNOSIS — I25.10 CORONARY ARTERY CALCIFICATION SEEN ON CAT SCAN: Primary | ICD-10-CM

## 2025-03-14 NOTE — RESULT ENCOUNTER NOTE
Please notify patient: Echo shows in general within normal limits except for smaller chambers, not sure why, probably she was born like that  Prior EKG showed fast heart rate, tachycardia on 1/10/2025  CTA chest 1/8/2025 does show some calcifications in the coronary arteries, I do suggest a referral to cardiologist I will place a referral, please give her contact information for [place] [facility]      Future Appointments  3/26/2025  1:00 PM    Amy Joseph MD    [facility]  3/27/2025  1:00 PM    Maria Del Rosario Lau MD         [facility]

## 2025-03-22 RX ORDER — POLYETHYLENE GLYCOL 3350 17 G/17G
17 POWDER, FOR SOLUTION ORAL
COMMUNITY
Start: 2024-12-31

## 2025-03-23 SDOH — HEALTH STABILITY: PHYSICAL HEALTH: ON AVERAGE, HOW MANY MINUTES DO YOU ENGAGE IN EXERCISE AT THIS LEVEL?: 0 MIN

## 2025-03-23 SDOH — HEALTH STABILITY: PHYSICAL HEALTH: ON AVERAGE, HOW MANY DAYS PER WEEK DO YOU ENGAGE IN MODERATE TO STRENUOUS EXERCISE (LIKE A BRISK WALK)?: 0 DAYS

## 2025-03-23 ASSESSMENT — LIFESTYLE VARIABLES
HOW MANY STANDARD DRINKS CONTAINING ALCOHOL DO YOU HAVE ON A TYPICAL DAY: 1 OR 2
HOW OFTEN DO YOU HAVE A DRINK CONTAINING ALCOHOL: MONTHLY OR LESS
HOW OFTEN DO YOU HAVE A DRINK CONTAINING ALCOHOL: 2
HOW OFTEN DO YOU HAVE SIX OR MORE DRINKS ON ONE OCCASION: 1
HOW MANY STANDARD DRINKS CONTAINING ALCOHOL DO YOU HAVE ON A TYPICAL DAY: 1

## 2025-03-23 ASSESSMENT — PATIENT HEALTH QUESTIONNAIRE - PHQ9
4. FEELING TIRED OR HAVING LITTLE ENERGY: NEARLY EVERY DAY
SUM OF ALL RESPONSES TO PHQ QUESTIONS 1-9: 13
7. TROUBLE CONCENTRATING ON THINGS, SUCH AS READING THE NEWSPAPER OR WATCHING TELEVISION: NOT AT ALL
6. FEELING BAD ABOUT YOURSELF - OR THAT YOU ARE A FAILURE OR HAVE LET YOURSELF OR YOUR FAMILY DOWN: MORE THAN HALF THE DAYS
10. IF YOU CHECKED OFF ANY PROBLEMS, HOW DIFFICULT HAVE THESE PROBLEMS MADE IT FOR YOU TO DO YOUR WORK, TAKE CARE OF THINGS AT HOME, OR GET ALONG WITH OTHER PEOPLE: SOMEWHAT DIFFICULT
5. POOR APPETITE OR OVEREATING: SEVERAL DAYS
3. TROUBLE FALLING OR STAYING ASLEEP: SEVERAL DAYS
9. THOUGHTS THAT YOU WOULD BE BETTER OFF DEAD, OR OF HURTING YOURSELF: NOT AT ALL
8. MOVING OR SPEAKING SO SLOWLY THAT OTHER PEOPLE COULD HAVE NOTICED. OR THE OPPOSITE, BEING SO FIGETY OR RESTLESS THAT YOU HAVE BEEN MOVING AROUND A LOT MORE THAN USUAL: SEVERAL DAYS
SUM OF ALL RESPONSES TO PHQ QUESTIONS 1-9: 13
SUM OF ALL RESPONSES TO PHQ QUESTIONS 1-9: 13
1. LITTLE INTEREST OR PLEASURE IN DOING THINGS: MORE THAN HALF THE DAYS
SUM OF ALL RESPONSES TO PHQ QUESTIONS 1-9: 13
2. FEELING DOWN, DEPRESSED OR HOPELESS: NEARLY EVERY DAY

## 2025-03-25 ENCOUNTER — TELEPHONE (OUTPATIENT)
Dept: GASTROENTEROLOGY | Age: 55
End: 2025-03-25

## 2025-03-25 NOTE — TELEPHONE ENCOUNTER
Patient called stating that she has an appt on Thursday, but she doesn't know why she is coming in since her colonoscopy was not scheduled. Would like a call back.

## 2025-03-26 ENCOUNTER — OFFICE VISIT (OUTPATIENT)
Dept: FAMILY MEDICINE CLINIC | Age: 55
End: 2025-03-26

## 2025-03-26 VITALS
OXYGEN SATURATION: 97 % | TEMPERATURE: 98.6 F | HEIGHT: 67 IN | BODY MASS INDEX: 21.19 KG/M2 | WEIGHT: 135 LBS | HEART RATE: 106 BPM | DIASTOLIC BLOOD PRESSURE: 76 MMHG | SYSTOLIC BLOOD PRESSURE: 120 MMHG

## 2025-03-26 DIAGNOSIS — J96.11 CHRONIC RESPIRATORY FAILURE WITH HYPOXIA: ICD-10-CM

## 2025-03-26 DIAGNOSIS — Z87.891 PERSONAL HISTORY OF TOBACCO USE: ICD-10-CM

## 2025-03-26 DIAGNOSIS — N18.31 STAGE 3A CHRONIC KIDNEY DISEASE (HCC): ICD-10-CM

## 2025-03-26 DIAGNOSIS — R26.89 POOR BALANCE: ICD-10-CM

## 2025-03-26 DIAGNOSIS — R53.82 CHRONIC FATIGUE: ICD-10-CM

## 2025-03-26 DIAGNOSIS — Z00.00 INITIAL MEDICARE ANNUAL WELLNESS VISIT: Primary | ICD-10-CM

## 2025-03-26 DIAGNOSIS — L30.9 DERMATITIS: ICD-10-CM

## 2025-03-26 DIAGNOSIS — D64.9 ANEMIA, UNSPECIFIED TYPE: ICD-10-CM

## 2025-03-26 DIAGNOSIS — Z78.0 POSTMENOPAUSAL STATE: ICD-10-CM

## 2025-03-26 DIAGNOSIS — E78.2 MIXED HYPERLIPIDEMIA: ICD-10-CM

## 2025-03-26 DIAGNOSIS — J44.9 CHRONIC OBSTRUCTIVE PULMONARY DISEASE, UNSPECIFIED COPD TYPE (HCC): ICD-10-CM

## 2025-03-26 DIAGNOSIS — F33.1 MODERATE EPISODE OF RECURRENT MAJOR DEPRESSIVE DISORDER (HCC): ICD-10-CM

## 2025-03-26 PROBLEM — Z91.81 HISTORY OF FALL: Status: ACTIVE | Noted: 2025-03-26

## 2025-03-26 RX ORDER — CLOTRIMAZOLE AND BETAMETHASONE DIPROPIONATE 10; .64 MG/G; MG/G
CREAM TOPICAL
Qty: 45 G | Refills: 1 | Status: SHIPPED | OUTPATIENT
Start: 2025-03-26

## 2025-03-26 RX ORDER — NICOTINE 21 MG/24HR
1 PATCH, TRANSDERMAL 24 HOURS TRANSDERMAL EVERY 24 HOURS
Qty: 30 PATCH | Refills: 0 | Status: SHIPPED | OUTPATIENT
Start: 2025-03-26

## 2025-03-26 RX ORDER — NICOTINE 21 MG/24HR
1 PATCH, TRANSDERMAL 24 HOURS TRANSDERMAL DAILY
Qty: 30 PATCH | Refills: 0 | Status: SHIPPED | OUTPATIENT
Start: 2025-03-26 | End: 2025-04-25

## 2025-03-26 SDOH — ECONOMIC STABILITY: FOOD INSECURITY: WITHIN THE PAST 12 MONTHS, YOU WORRIED THAT YOUR FOOD WOULD RUN OUT BEFORE YOU GOT MONEY TO BUY MORE.: NEVER TRUE

## 2025-03-26 SDOH — ECONOMIC STABILITY: FOOD INSECURITY: WITHIN THE PAST 12 MONTHS, THE FOOD YOU BOUGHT JUST DIDN'T LAST AND YOU DIDN'T HAVE MONEY TO GET MORE.: NEVER TRUE

## 2025-03-26 ASSESSMENT — ENCOUNTER SYMPTOMS
ABDOMINAL PAIN: 0
BLOOD IN STOOL: 0
VOMITING: 0
WHEEZING: 0
NAUSEA: 0
CHEST TIGHTNESS: 0
ABDOMINAL DISTENTION: 0
SHORTNESS OF BREATH: 1
CONSTIPATION: 0
BACK PAIN: 0
DIARRHEA: 0
COUGH: 1

## 2025-03-26 NOTE — PROGRESS NOTES
Visit Information    Have you changed or started any medications since your last visit including any over-the-counter medicines, vitamins, or herbal medicines? yes -    Have you stopped taking any of your medications? Is so, why? -  yes -   Are you having any side effects from any of your medications? - no    Have you seen any other physician or provider since your last visit?  yes -    Have you had any other diagnostic tests since your last visit?  yes   Have you been seen in the emergency room and/or had an admission in a hospital since we last saw you?  yes -    Have you had your routine dental cleaning in the past 6 months?  no     Do you have an active MyChart account? If no, what is the barrier?  Yes    Patient Care Team:  Amy Joseph MD as PCP - General  Amy Joseph MD as PCP - Empaneled Provider  Hugh Patterson MD as Surgeon (Orthopedic Surgery)  Maria Del Rosario Lau MD as Consulting Physician (Gastroenterology)  Didier Velázquez MD as Consulting Physician (Pulmonology)    Medical History Review  Past Medical, Family, and Social History reviewed and does contribute to the patient presenting condition    Health Maintenance   Topic Date Due    Hepatitis B vaccine (1 of 3 - 19+ 3-dose series) Never done    DTaP/Tdap/Td vaccine (1 - Tdap) Never done    Colorectal Cancer Screen  Never done    Shingles vaccine (1 of 2) Never done    COVID-19 Vaccine (1 - 2024-25 season) Never done    Annual Wellness Visit (Medicare Advantage)  Never done    Lipids  01/04/2026    GFR test (Diabetes, CKD 3-4, OR last GFR 15-59)  01/08/2026    Lung Cancer Screening &/or Counseling  01/08/2026    Breast cancer screen  03/12/2026    Depression Monitoring  03/23/2026    Flu vaccine  Completed    Pneumococcal 50+ years Vaccine  Completed    Hepatitis C screen  Completed    HIV screen  Completed    Hepatitis A vaccine  Aged Out    Hib vaccine  Aged Out    Polio vaccine  Aged Out    Meningococcal (ACWY) vaccine  Aged Out    
respiratory failure with hypoxia    Chronic improved with oxygen at 2 L/min, continue oxygen at 2 L/min and quit smoking    Orders:    ESTABLISHED, MOD MDM, 30-39 MIN [[id number]]    DME Order for Walker as OP    Moderate episode of recurrent major depressive disorder (HCC)    Chronic, stable,    Continue current treatment and follow up with psychiatrist and psychologist as scheduled at [facility].  Will continue to monitor      3/23/2025     9:47 AM 3/13/2024     9:58 AM 12/15/2021     9:43 AM 9/15/2021    11:49 AM 7/6/2018     9:00 AM 8/13/2013     3:00 PM   PHQ Scores   PHQ2 Score 5  6 0 6 3    PHQ9 Score 13  15 0 21 10 16       Patient-reported     Interpretation of Total Score Depression Severity: 1-4 = Minimal depression, 5-9 = Mild depression, 10-14 = Moderate depression, 15-19 = Moderately severe depression, 20-27 = Severe depression    Orders:    ESTABLISHED, MOD MDM, 30-39 MIN [[id number]]    Dermatitis right elbow    Chronic, with hyperkeratotic skin, and heavy scale over the right elbow  Could be eczema versus psoriasis, however it is unilaterally, and rarely psoriasis is unilateral, or it can be a fungal infection  We will give a trial of Lotrisone cream with both steroid and antifungal, if not improving advised to call us back and refer to dermatologist.  She declines referral to dermatologist at this time  Orders:    ESTABLISHED, MOD MDM, 30-39 MIN [[id number]]    clotrimazole-betamethasone (LOTRISONE) 1-0.05 % cream; Apply topically 2 times daily x 4 weeks on the right elbow    Chronic fatigue    Chronic, ongoing  Will do basic labs to rule out certain common medical conditions: hematologic, renal, hepatic, electrolyte imbalances, thyroid disorders.    Orders:    ESTABLISHED, MOD MDM, 30-39 MIN [[id number]]    CBC with Auto Differential; Future    Comprehensive Metabolic Panel; Future    TSH; Future    Stage 3a chronic kidney disease (HCC)    Chronic stage IIIa, stable  Recheck labs, rule out any structural disease of the

## 2025-03-26 NOTE — ASSESSMENT & PLAN NOTE
Counseling given to quit smoking, to set up her date to quit smoking, which is [age] [birth year], nicotine patches given, counseling given, absolutely not to smoke while having oxygen on    Orders:    ESTABLISHED, MOD MDM, 30-39 MIN [[id number]]    nicotine (NICODERM CQ) 21 MG/24HR; Place 1 patch onto the skin every 24 hours Step 1    nicotine (NICODERM CQ) 14 MG/24HR; Place 1 patch onto the skin daily Step 2    nicotine (NICODERM CQ) 7 MG/24HR; Place 1 patch onto the skin daily Step 3

## 2025-03-26 NOTE — ASSESSMENT & PLAN NOTE
Chronic, stable, severe   She is currently on albuterol, DuoNebs, and Trelegy once a day.  She reports compliance with her medications  - She reports being short of breath when standing up, likely due to smoking.  - She has been advised to quit smoking and will start with higher dosage nicotine patches (21 mg), followed by step two (14 mg), and then step three (7 mg).  Continue oxygen continuously at 2 L/min  Orders:    ESTABLISHED, MOD MDM, 30-39 MIN [[id number]]    DME Order for Walker as OP

## 2025-03-26 NOTE — ASSESSMENT & PLAN NOTE
Mild anemia chronic   Previous blood work indicated anemia on 01/08/2025.  - The cause was not identified, except for low B12 levels were noted.  - Blood work will be rechecked to monitor anemia and assess iron levels.  She is planning to see GI and to schedule her colonoscopy  Orders:    ESTABLISHED, MOD MDM, 30-39 MIN [[id number]]    cyanocobalamin ([employer] VITAMIN B12) 1000 MCG tablet; Take 1 tablet by mouth daily    CBC with Auto Differential; Future    Iron and TIBC; Future    Ferritin; Future

## 2025-03-26 NOTE — ASSESSMENT & PLAN NOTE
Chronic improved with oxygen at 2 L/min, continue oxygen at 2 L/min and quit smoking    Orders:    ESTABLISHED, MOD MDM, 30-39 MIN [[id number]]    DME Order for Walker as OP

## 2025-03-26 NOTE — ASSESSMENT & PLAN NOTE
Chronic, stable,    Continue current treatment and follow up with psychiatrist and psychologist as scheduled at [facility].  Will continue to monitor      3/23/2025     9:47 AM 3/13/2024     9:58 AM 12/15/2021     9:43 AM 9/15/2021    11:49 AM 7/6/2018     9:00 AM 8/13/2013     3:00 PM   PHQ Scores   PHQ2 Score 5  6 0 6 3    PHQ9 Score 13  15 0 21 10 16       Patient-reported     Interpretation of Total Score Depression Severity: 1-4 = Minimal depression, 5-9 = Mild depression, 10-14 = Moderate depression, 15-19 = Moderately severe depression, 20-27 = Severe depression    Orders:    ESTABLISHED, MOD MDM, 30-39 MIN [[id number]]

## 2025-03-26 NOTE — ASSESSMENT & PLAN NOTE
Chronic  - Cholesterol levels were high in previous tests.  - Blood work will be rechecked to monitor cholesterol levels and assess the effectiveness of pravastatin.    Orders:    ESTABLISHED, MOD MDM, 30-39 MIN [[id number]]    Lipid Panel; Future

## 2025-03-26 NOTE — ASSESSMENT & PLAN NOTE
Chronic, ongoing  Will do basic labs to rule out certain common medical conditions: hematologic, renal, hepatic, electrolyte imbalances, thyroid disorders.    Orders:    ESTABLISHED, MOD MDM, 30-39 MIN [[id number]]    CBC with Auto Differential; Future    Comprehensive Metabolic Panel; Future    TSH; Future

## 2025-03-27 NOTE — ASSESSMENT & PLAN NOTE
Chronic, worsening, likely deconditioning, she declines referral to physical therapy  High risk for falls, I tried to convince her to do physical therapy she still declines  She would benefit from using a walker inside of the house and outside of the house to help her prevent falls, she gets easily shortness of breath, she will need to sit to rest in between    Eugenie Estrada was evaluated today and a DME order was entered for a wheeled walker with seat because she requires this to successfully complete daily living tasks of personal cares and ambulating.  A wheeled walker with seat is necessary due to the patient's unsteady gait, upper body weakness, inability to  and ambulation device, ambulating only short distances by pushing a walker, and the need to sit for a short time before resuming ambulation.  These tasks cannot be completed with a lesser ambulation device such as a cane, crutch, or standard walker.  The need for this equipment was discussed with the patient and she understands and is in agreement.      Orders:    ESTABLISHED, MOD MDM, 30-39 MIN [[id number]]    DME Order for Walker as OP

## 2025-05-30 ENCOUNTER — HOSPITAL ENCOUNTER (EMERGENCY)
Age: 55
Discharge: HOME | DRG: 191 | End: 2025-05-30
Attending: EMERGENCY MEDICINE
Payer: COMMERCIAL

## 2025-05-30 ENCOUNTER — APPOINTMENT (OUTPATIENT)
Dept: GENERAL RADIOLOGY | Age: 55
DRG: 191 | End: 2025-05-30
Payer: COMMERCIAL

## 2025-05-30 VITALS
BODY MASS INDEX: 21.97 KG/M2 | HEIGHT: 67 IN | WEIGHT: 140 LBS | SYSTOLIC BLOOD PRESSURE: 104 MMHG | TEMPERATURE: 97.9 F | HEART RATE: 84 BPM | RESPIRATION RATE: 13 BRPM | DIASTOLIC BLOOD PRESSURE: 62 MMHG | OXYGEN SATURATION: 97 %

## 2025-05-30 DIAGNOSIS — J44.1 COPD EXACERBATION (HCC): Primary | ICD-10-CM

## 2025-05-30 LAB
ANION GAP SERPL CALCULATED.3IONS-SCNC: 14 MMOL/L (ref 9–16)
BASOPHILS # BLD: 0.03 K/UL (ref 0–0.2)
BASOPHILS NFR BLD: 1 % (ref 0–2)
BUN SERPL-MCNC: 14 MG/DL (ref 6–20)
CALCIUM SERPL-MCNC: 9.2 MG/DL (ref 8.6–10.4)
CHLORIDE SERPL-SCNC: 104 MMOL/L (ref 98–107)
CO2 SERPL-SCNC: 21 MMOL/L (ref 20–31)
CREAT SERPL-MCNC: 0.9 MG/DL (ref 0.7–1.2)
EKG ATRIAL RATE: 91 BPM
EKG P AXIS: 114 DEGREES
EKG P-R INTERVAL: 162 MS
EKG Q-T INTERVAL: 368 MS
EKG QRS DURATION: 76 MS
EKG QTC CALCULATION (BAZETT): 452 MS
EKG R AXIS: 130 DEGREES
EKG T AXIS: 142 DEGREES
EKG VENTRICULAR RATE: 91 BPM
EOSINOPHIL # BLD: 0.04 K/UL (ref 0–0.44)
EOSINOPHILS RELATIVE PERCENT: 1 % (ref 0–4)
ERYTHROCYTE [DISTWIDTH] IN BLOOD BY AUTOMATED COUNT: 13.9 % (ref 11.5–14.9)
GFR, ESTIMATED: 76 ML/MIN/1.73M2
GLUCOSE SERPL-MCNC: 109 MG/DL (ref 74–99)
HCT VFR BLD AUTO: 35.9 % (ref 36–46)
HGB BLD-MCNC: 12 G/DL (ref 12–16)
IMM GRANULOCYTES # BLD AUTO: <0.03 K/UL (ref 0–0.3)
IMM GRANULOCYTES NFR BLD: 0 %
LYMPHOCYTES NFR BLD: 1.49 K/UL (ref 1.1–3.7)
LYMPHOCYTES RELATIVE PERCENT: 31 % (ref 24–44)
MAGNESIUM SERPL-MCNC: 2.1 MG/DL (ref 1.6–2.6)
MCH RBC QN AUTO: 28.9 PG (ref 26–34)
MCHC RBC AUTO-ENTMCNC: 33.4 G/DL (ref 31–37)
MCV RBC AUTO: 86.5 FL (ref 80–100)
MONOCYTES NFR BLD: 0.39 K/UL (ref 0.1–1.2)
MONOCYTES NFR BLD: 8 % (ref 3–12)
NEUTROPHILS NFR BLD: 59 % (ref 36–66)
NEUTS SEG NFR BLD: 2.81 K/UL (ref 1.5–8.1)
NRBC BLD-RTO: 0 PER 100 WBC
PLATELET # BLD AUTO: 217 K/UL (ref 150–450)
PMV BLD AUTO: 9 FL (ref 8–13.5)
POTASSIUM SERPL-SCNC: 3.8 MMOL/L (ref 3.7–5.3)
RBC # BLD AUTO: 4.15 M/UL (ref 3.95–5.11)
SODIUM SERPL-SCNC: 139 MMOL/L (ref 136–145)
TROPONIN I SERPL HS-MCNC: 9 NG/L (ref 0–14)
TROPONIN I SERPL HS-MCNC: 9 NG/L (ref 0–14)
WBC OTHER # BLD: 4.8 K/UL (ref 3.5–11)

## 2025-05-30 PROCEDURE — 93005 ELECTROCARDIOGRAM TRACING: CPT

## 2025-05-30 PROCEDURE — 36415 COLL VENOUS BLD VENIPUNCTURE: CPT

## 2025-05-30 PROCEDURE — 73564 X-RAY EXAM KNEE 4 OR MORE: CPT

## 2025-05-30 PROCEDURE — 2500000003 HC RX 250 WO HCPCS

## 2025-05-30 PROCEDURE — 6360000002 HC RX W HCPCS

## 2025-05-30 PROCEDURE — 85025 COMPLETE CBC W/AUTO DIFF WBC: CPT

## 2025-05-30 PROCEDURE — 83735 ASSAY OF MAGNESIUM: CPT

## 2025-05-30 PROCEDURE — 84484 ASSAY OF TROPONIN QUANT: CPT

## 2025-05-30 PROCEDURE — 80048 BASIC METABOLIC PNL TOTAL CA: CPT

## 2025-05-30 PROCEDURE — 94664 DEMO&/EVAL PT USE INHALER: CPT

## 2025-05-30 PROCEDURE — 71046 X-RAY EXAM CHEST 2 VIEWS: CPT

## 2025-05-30 PROCEDURE — 94761 N-INVAS EAR/PLS OXIMETRY MLT: CPT

## 2025-05-30 PROCEDURE — 6370000000 HC RX 637 (ALT 250 FOR IP)

## 2025-05-30 PROCEDURE — 94640 AIRWAY INHALATION TREATMENT: CPT

## 2025-05-30 PROCEDURE — 2700000000 HC OXYGEN THERAPY PER DAY

## 2025-05-30 RX ORDER — PREDNISONE 20 MG/1
40 TABLET ORAL DAILY
Qty: 10 TABLET | Refills: 0 | Status: ON HOLD | OUTPATIENT
Start: 2025-05-30 | End: 2025-06-04

## 2025-05-30 RX ORDER — IPRATROPIUM BROMIDE AND ALBUTEROL SULFATE 2.5; .5 MG/3ML; MG/3ML
SOLUTION RESPIRATORY (INHALATION)
Status: COMPLETED
Start: 2025-05-30 | End: 2025-05-30

## 2025-05-30 RX ORDER — IPRATROPIUM BROMIDE AND ALBUTEROL SULFATE 2.5; .5 MG/3ML; MG/3ML
1 SOLUTION RESPIRATORY (INHALATION)
Status: DISCONTINUED | OUTPATIENT
Start: 2025-05-30 | End: 2025-05-30 | Stop reason: HOSPADM

## 2025-05-30 RX ADMIN — IPRATROPIUM BROMIDE AND ALBUTEROL SULFATE 1 DOSE: .5; 2.5 SOLUTION RESPIRATORY (INHALATION) at 18:18

## 2025-05-30 RX ADMIN — WATER 125 MG: 1 INJECTION INTRAMUSCULAR; INTRAVENOUS; SUBCUTANEOUS at 18:38

## 2025-05-30 RX ADMIN — IPRATROPIUM BROMIDE AND ALBUTEROL SULFATE 1 DOSE: 2.5; .5 SOLUTION RESPIRATORY (INHALATION) at 18:18

## 2025-05-30 ASSESSMENT — PAIN DESCRIPTION - LOCATION: LOCATION: CHEST

## 2025-05-30 ASSESSMENT — PAIN SCALES - GENERAL: PAINLEVEL_OUTOF10: 7

## 2025-05-30 ASSESSMENT — PAIN DESCRIPTION - DESCRIPTORS: DESCRIPTORS: TIGHTNESS

## 2025-05-30 ASSESSMENT — PAIN - FUNCTIONAL ASSESSMENT: PAIN_FUNCTIONAL_ASSESSMENT: 0-10

## 2025-05-30 ASSESSMENT — HEART SCORE: ECG: NORMAL

## 2025-05-30 NOTE — ED PROVIDER NOTES
[facility] EMERGENCY DEPARTMENT  eMERGENCY dEPARTMENT eNCOUnter   Attending Attestation     Pt Name: Eugenie Estrada  MRN: [id number]  Birthdate 1970  Date of evaluation: 5/30/25       Eugenie Estrada is a 55 y.o. female who presents with Shortness of Breath (Patient feels like there is a tight band around her chest and like she's having a hard time getting enough oxygen. Patient reports chronic o2 use of 2L/min. Patient has a history of COPD) and Fall      History:   Patient is here because she has had 3 days of chest pain and shortness of breath.  Patient states she has history of COPD and is on oxygen at home.  Patient's not had to use more oxygen and states that she is not having an increase in cough.  Patient had no fevers.  Patient has had issues with her chest in the past and is actually supposed get a stress test here in a couple weeks.  Patient states that yesterday she got up really fast lost her balance fell in, landed on her right knee.  Patient states she has pain there but she is able to walk on it.    Exam: Vitals:   Vitals:    05/30/25 1725   BP: (!) 112/95   Pulse: 98   Resp: 20   Temp: 97.9 °F (36.6 °C)   TempSrc: Oral   SpO2: 95%   Weight: 63.5 kg (140 lb)   Height: 1.702 m (5' 7\")     Heart regular rate rhythm no murmurs.  Lungs clear to auscultation bilaterally.  Examination of the right knee shows diffuse tenderness palpation no swelling no deformity.    I performed a history and physical examination of the patient and discussed management with the resident. I reviewed the resident’s note and agree with the documented findings and plan of care. Any areas of disagreement are noted on the chart. I was personally present for the key portions of any procedures. I have documented in the chart those procedures where I was not present during the key portions. I have personally reviewed all images and agree with the resident's interpretation. I have reviewed the emergency nurses triage note. I  agree with the chief complaint, past medical history, past surgical history, allergies, medications, social and family history as documented unless otherwise noted below. Documentation of the HPI, Physical Exam and Medical Decision Making performed by medical students or scribes is based on my personal performance of the HPI, PE and MDM. I personally evaluated and examined the patient in conjunction with the APC and agree with the assessment, treatment plan, and disposition of the patient as recorded by the APC. Additional findings are as noted.    Denzel Fragoso MD  Attending Emergency  Physician              Denzel Fragoso MD  05/30/25 [id number]

## 2025-05-30 NOTE — ED PROVIDER NOTES
[facility] EMERGENCY DEPARTMENT  Emergency Department Encounter  Emergency Medicine Resident     Pt Name:Eugenie Estrada  MRN: [id number]  Birthdate 1970  Date of evaluation: 5/30/25  PCP:  Amy Joseph MD  Note Started: 5:30 PM EDT      CHIEF COMPLAINT       Chief Complaint   Patient presents with    Shortness of Breath     Patient feels like there is a tight band around her chest and like she's having a hard time getting enough oxygen. Patient reports chronic o2 use of 2L/min. Patient has a history of COPD    Fall       HISTORY OF PRESENT ILLNESS  (Location/Symptom, Timing/Onset, Context/Setting, Quality, Duration, Modifying Factors, Severity.)      Eugenie Estrada is a 55 y.o. female who presents with shortness of breath and chest tightness that started a few days ago.  Initially thought it was her anxiety but went to her therapist today who encouraged her to come to the emergency room.  History of COPD on 2 L of oxygen via nasal cannula at baseline history of blebs and emphysema.  Has been using her albuterol without significant improvement.  No fevers but has had chills which is baseline for her.  No significant cough or congestion.  No diarrhea or abdominal pain.  Denies any sick contacts.  No recent steroids or antibiotics.  Patient also states she got lightheaded this morning when she got up quickly to go to the bathroom.  Fell landing on her right knee.  Denies hitting her head or loss of consciousness.    PAST MEDICAL / SURGICAL / SOCIAL / FAMILY HISTORY      has a past medical history of Acute bronchitis with bronchospasm, Allergic rhinitis, Anxiety, Anxiety and depression, Apical lung scarring, Asthma, Borderline personality disorder, Chronic abdominal pain, Chronic fatigue, COPD, mild (HCC), Coronary artery calcification seen on CAT scan, Emphysema of lung (HCC), History of bronchitis, History of chest pain, History of pneumocystis pneumonia, History of suicide attempt, Hyperglycemia,

## 2025-05-31 ENCOUNTER — HOSPITAL ENCOUNTER (INPATIENT)
Age: 55
LOS: 2 days | Discharge: HOME | DRG: 191 | End: 2025-06-02
Attending: EMERGENCY MEDICINE
Payer: COMMERCIAL

## 2025-05-31 ENCOUNTER — APPOINTMENT (OUTPATIENT)
Dept: GENERAL RADIOLOGY | Age: 55
DRG: 191 | End: 2025-05-31
Payer: COMMERCIAL

## 2025-05-31 DIAGNOSIS — I20.9 ANGINA PECTORIS: ICD-10-CM

## 2025-05-31 DIAGNOSIS — R07.9 CHEST PAIN, UNSPECIFIED TYPE: Primary | ICD-10-CM

## 2025-05-31 PROBLEM — J44.1 COPD EXACERBATION (HCC): Status: ACTIVE | Noted: 2025-05-31

## 2025-05-31 LAB
ANION GAP SERPL CALCULATED.3IONS-SCNC: 15 MMOL/L (ref 9–16)
BASOPHILS # BLD: 0.03 K/UL (ref 0–0.2)
BASOPHILS NFR BLD: 0 % (ref 0–2)
BUN SERPL-MCNC: 14 MG/DL (ref 6–20)
CALCIUM SERPL-MCNC: 9.3 MG/DL (ref 8.6–10.4)
CHLORIDE SERPL-SCNC: 102 MMOL/L (ref 98–107)
CO2 SERPL-SCNC: 20 MMOL/L (ref 20–31)
CREAT SERPL-MCNC: 0.9 MG/DL (ref 0.7–1.2)
EOSINOPHIL # BLD: <0.03 K/UL (ref 0–0.44)
EOSINOPHILS RELATIVE PERCENT: 0 % (ref 0–4)
ERYTHROCYTE [DISTWIDTH] IN BLOOD BY AUTOMATED COUNT: 13.7 % (ref 11.5–14.9)
GFR, ESTIMATED: 76 ML/MIN/1.73M2
GLUCOSE SERPL-MCNC: 108 MG/DL (ref 74–99)
HCT VFR BLD AUTO: 34.1 % (ref 36–46)
HGB BLD-MCNC: 11.8 G/DL (ref 12–16)
IMM GRANULOCYTES # BLD AUTO: 0.08 K/UL (ref 0–0.3)
IMM GRANULOCYTES NFR BLD: 1 %
LYMPHOCYTES NFR BLD: 1.35 K/UL (ref 1.1–3.7)
LYMPHOCYTES RELATIVE PERCENT: 10 % (ref 24–44)
MAGNESIUM SERPL-MCNC: 2 MG/DL (ref 1.6–2.6)
MCH RBC QN AUTO: 29.4 PG (ref 26–34)
MCHC RBC AUTO-ENTMCNC: 34.6 G/DL (ref 31–37)
MCV RBC AUTO: 85 FL (ref 80–100)
MONOCYTES NFR BLD: 0.63 K/UL (ref 0.1–1.2)
MONOCYTES NFR BLD: 5 % (ref 3–12)
NEUTROPHILS NFR BLD: 84 % (ref 36–66)
NEUTS SEG NFR BLD: 10.99 K/UL (ref 1.5–8.1)
NRBC BLD-RTO: 0 PER 100 WBC
PLATELET # BLD AUTO: 234 K/UL (ref 150–450)
PMV BLD AUTO: 8.9 FL (ref 8–13.5)
POTASSIUM SERPL-SCNC: 4 MMOL/L (ref 3.7–5.3)
RBC # BLD AUTO: 4.01 M/UL (ref 3.95–5.11)
SODIUM SERPL-SCNC: 137 MMOL/L (ref 136–145)
TROPONIN I SERPL HS-MCNC: 10 NG/L (ref 0–14)
TROPONIN I SERPL HS-MCNC: 8 NG/L (ref 0–14)
TSH SERPL DL<=0.05 MIU/L-ACNC: 1.05 UIU/ML (ref 0.27–4.2)
WBC OTHER # BLD: 13.1 K/UL (ref 3.5–11)

## 2025-05-31 PROCEDURE — 2500000003 HC RX 250 WO HCPCS

## 2025-05-31 PROCEDURE — 6360000002 HC RX W HCPCS

## 2025-05-31 PROCEDURE — 85025 COMPLETE CBC W/AUTO DIFF WBC: CPT

## 2025-05-31 PROCEDURE — 71046 X-RAY EXAM CHEST 2 VIEWS: CPT

## 2025-05-31 PROCEDURE — 2580000003 HC RX 258

## 2025-05-31 PROCEDURE — 6370000000 HC RX 637 (ALT 250 FOR IP)

## 2025-05-31 PROCEDURE — 96375 TX/PRO/DX INJ NEW DRUG ADDON: CPT

## 2025-05-31 PROCEDURE — 36415 COLL VENOUS BLD VENIPUNCTURE: CPT

## 2025-05-31 PROCEDURE — 93005 ELECTROCARDIOGRAM TRACING: CPT

## 2025-05-31 PROCEDURE — 2700000000 HC OXYGEN THERAPY PER DAY

## 2025-05-31 PROCEDURE — 94640 AIRWAY INHALATION TREATMENT: CPT

## 2025-05-31 PROCEDURE — 1200000000 HC SEMI PRIVATE

## 2025-05-31 PROCEDURE — 83735 ASSAY OF MAGNESIUM: CPT

## 2025-05-31 PROCEDURE — 99285 EMERGENCY DEPT VISIT HI MDM: CPT

## 2025-05-31 PROCEDURE — 84484 ASSAY OF TROPONIN QUANT: CPT

## 2025-05-31 PROCEDURE — 6370000000 HC RX 637 (ALT 250 FOR IP): Performed by: NURSE PRACTITIONER

## 2025-05-31 PROCEDURE — 84443 ASSAY THYROID STIM HORMONE: CPT

## 2025-05-31 PROCEDURE — 96374 THER/PROPH/DIAG INJ IV PUSH: CPT

## 2025-05-31 PROCEDURE — 80048 BASIC METABOLIC PNL TOTAL CA: CPT

## 2025-05-31 PROCEDURE — 0202U NFCT DS 22 TRGT SARS-COV-2: CPT

## 2025-05-31 PROCEDURE — 94761 N-INVAS EAR/PLS OXIMETRY MLT: CPT

## 2025-05-31 PROCEDURE — 99223 1ST HOSP IP/OBS HIGH 75: CPT

## 2025-05-31 RX ORDER — SODIUM CHLORIDE 9 MG/ML
INJECTION, SOLUTION INTRAVENOUS PRN
Status: DISCONTINUED | OUTPATIENT
Start: 2025-05-31 | End: 2025-06-02 | Stop reason: HOSPADM

## 2025-05-31 RX ORDER — IPRATROPIUM BROMIDE AND ALBUTEROL SULFATE 2.5; .5 MG/3ML; MG/3ML
1 SOLUTION RESPIRATORY (INHALATION)
Status: DISCONTINUED | OUTPATIENT
Start: 2025-05-31 | End: 2025-05-31

## 2025-05-31 RX ORDER — KETOROLAC TROMETHAMINE 30 MG/ML
30 INJECTION, SOLUTION INTRAMUSCULAR; INTRAVENOUS ONCE
Status: COMPLETED | OUTPATIENT
Start: 2025-05-31 | End: 2025-05-31

## 2025-05-31 RX ORDER — MAGNESIUM SULFATE HEPTAHYDRATE 40 MG/ML
2000 INJECTION, SOLUTION INTRAVENOUS PRN
Status: DISCONTINUED | OUTPATIENT
Start: 2025-05-31 | End: 2025-06-02 | Stop reason: HOSPADM

## 2025-05-31 RX ORDER — LANOLIN ALCOHOL/MO/W.PET/CERES
1000 CREAM (GRAM) TOPICAL DAILY
Status: DISCONTINUED | OUTPATIENT
Start: 2025-06-01 | End: 2025-06-02 | Stop reason: HOSPADM

## 2025-05-31 RX ORDER — ENOXAPARIN SODIUM 100 MG/ML
40 INJECTION SUBCUTANEOUS DAILY
Status: DISCONTINUED | OUTPATIENT
Start: 2025-06-01 | End: 2025-06-02 | Stop reason: HOSPADM

## 2025-05-31 RX ORDER — QUETIAPINE FUMARATE 200 MG/1
200 TABLET, FILM COATED ORAL DAILY
Status: DISCONTINUED | OUTPATIENT
Start: 2025-06-01 | End: 2025-06-02 | Stop reason: HOSPADM

## 2025-05-31 RX ORDER — SODIUM CHLORIDE, SODIUM LACTATE, POTASSIUM CHLORIDE, AND CALCIUM CHLORIDE .6; .31; .03; .02 G/100ML; G/100ML; G/100ML; G/100ML
500 INJECTION, SOLUTION INTRAVENOUS ONCE
Status: COMPLETED | OUTPATIENT
Start: 2025-05-31 | End: 2025-05-31

## 2025-05-31 RX ORDER — SODIUM CHLORIDE 0.9 % (FLUSH) 0.9 %
5-40 SYRINGE (ML) INJECTION EVERY 12 HOURS SCHEDULED
Status: DISCONTINUED | OUTPATIENT
Start: 2025-05-31 | End: 2025-06-02 | Stop reason: HOSPADM

## 2025-05-31 RX ORDER — ONDANSETRON 4 MG/1
4 TABLET, ORALLY DISINTEGRATING ORAL EVERY 8 HOURS PRN
Status: DISCONTINUED | OUTPATIENT
Start: 2025-05-31 | End: 2025-06-02 | Stop reason: HOSPADM

## 2025-05-31 RX ORDER — SODIUM CHLORIDE 0.9 % (FLUSH) 0.9 %
5-40 SYRINGE (ML) INJECTION PRN
Status: DISCONTINUED | OUTPATIENT
Start: 2025-05-31 | End: 2025-06-02 | Stop reason: HOSPADM

## 2025-05-31 RX ORDER — NICOTINE 21 MG/24HR
1 PATCH, TRANSDERMAL 24 HOURS TRANSDERMAL EVERY 24 HOURS
Status: DISCONTINUED | OUTPATIENT
Start: 2025-05-31 | End: 2025-06-02 | Stop reason: HOSPADM

## 2025-05-31 RX ORDER — MIRTAZAPINE 15 MG/1
7.5 TABLET, FILM COATED ORAL NIGHTLY
Status: DISCONTINUED | OUTPATIENT
Start: 2025-05-31 | End: 2025-06-02 | Stop reason: HOSPADM

## 2025-05-31 RX ORDER — AZITHROMYCIN 250 MG/1
500 TABLET, FILM COATED ORAL DAILY
Status: COMPLETED | OUTPATIENT
Start: 2025-05-31 | End: 2025-06-02

## 2025-05-31 RX ORDER — ACETAMINOPHEN 650 MG/1
650 SUPPOSITORY RECTAL EVERY 6 HOURS PRN
Status: DISCONTINUED | OUTPATIENT
Start: 2025-05-31 | End: 2025-06-02 | Stop reason: HOSPADM

## 2025-05-31 RX ORDER — ASPIRIN 81 MG/1
81 TABLET ORAL DAILY
Status: DISCONTINUED | OUTPATIENT
Start: 2025-05-31 | End: 2025-06-02 | Stop reason: HOSPADM

## 2025-05-31 RX ORDER — POLYETHYLENE GLYCOL 3350 17 G/17G
17 POWDER, FOR SOLUTION ORAL DAILY PRN
Status: DISCONTINUED | OUTPATIENT
Start: 2025-05-31 | End: 2025-06-02 | Stop reason: HOSPADM

## 2025-05-31 RX ORDER — HYDROCODONE BITARTRATE AND ACETAMINOPHEN 5; 325 MG/1; MG/1
1 TABLET ORAL EVERY 6 HOURS PRN
Refills: 0 | Status: DISCONTINUED | OUTPATIENT
Start: 2025-05-31 | End: 2025-06-02 | Stop reason: HOSPADM

## 2025-05-31 RX ORDER — CLONAZEPAM 0.5 MG/1
0.25 TABLET ORAL 3 TIMES DAILY
Status: DISCONTINUED | OUTPATIENT
Start: 2025-05-31 | End: 2025-06-02 | Stop reason: HOSPADM

## 2025-05-31 RX ORDER — ACETAMINOPHEN 325 MG/1
650 TABLET ORAL EVERY 6 HOURS PRN
Status: DISCONTINUED | OUTPATIENT
Start: 2025-05-31 | End: 2025-06-02 | Stop reason: HOSPADM

## 2025-05-31 RX ORDER — ESCITALOPRAM OXALATE 20 MG/1
20 TABLET ORAL NIGHTLY
Status: DISCONTINUED | OUTPATIENT
Start: 2025-05-31 | End: 2025-06-02 | Stop reason: HOSPADM

## 2025-05-31 RX ORDER — IPRATROPIUM BROMIDE AND ALBUTEROL SULFATE 2.5; .5 MG/3ML; MG/3ML
1 SOLUTION RESPIRATORY (INHALATION)
Status: DISCONTINUED | OUTPATIENT
Start: 2025-05-31 | End: 2025-06-01

## 2025-05-31 RX ORDER — PRAVASTATIN SODIUM 20 MG
20 TABLET ORAL EVERY EVENING
Status: DISCONTINUED | OUTPATIENT
Start: 2025-05-31 | End: 2025-06-02 | Stop reason: HOSPADM

## 2025-05-31 RX ORDER — POTASSIUM CHLORIDE 1500 MG/1
40 TABLET, EXTENDED RELEASE ORAL PRN
Status: DISCONTINUED | OUTPATIENT
Start: 2025-05-31 | End: 2025-06-02 | Stop reason: HOSPADM

## 2025-05-31 RX ORDER — ONDANSETRON 2 MG/ML
4 INJECTION INTRAMUSCULAR; INTRAVENOUS EVERY 6 HOURS PRN
Status: DISCONTINUED | OUTPATIENT
Start: 2025-05-31 | End: 2025-06-02 | Stop reason: HOSPADM

## 2025-05-31 RX ORDER — POTASSIUM CHLORIDE 7.45 MG/ML
10 INJECTION INTRAVENOUS PRN
Status: DISCONTINUED | OUTPATIENT
Start: 2025-05-31 | End: 2025-06-02 | Stop reason: HOSPADM

## 2025-05-31 RX ADMIN — SODIUM CHLORIDE, PRESERVATIVE FREE 10 ML: 5 INJECTION INTRAVENOUS at 20:35

## 2025-05-31 RX ADMIN — IPRATROPIUM BROMIDE AND ALBUTEROL SULFATE 1 DOSE: .5; 2.5 SOLUTION RESPIRATORY (INHALATION) at 19:30

## 2025-05-31 RX ADMIN — QUETIAPINE FUMARATE 300 MG: 200 TABLET ORAL at 20:34

## 2025-05-31 RX ADMIN — HYDROCODONE BITARTRATE AND ACETAMINOPHEN 1 TABLET: 5; 325 TABLET ORAL at 20:34

## 2025-05-31 RX ADMIN — CLONAZEPAM 0.25 MG: 0.5 TABLET ORAL at 20:40

## 2025-05-31 RX ADMIN — MIRTAZAPINE 7.5 MG: 15 TABLET, FILM COATED ORAL at 20:34

## 2025-05-31 RX ADMIN — WATER 40 MG: 1 INJECTION INTRAMUSCULAR; INTRAVENOUS; SUBCUTANEOUS at 23:49

## 2025-05-31 RX ADMIN — WATER 60 MG: 1 INJECTION INTRAMUSCULAR; INTRAVENOUS; SUBCUTANEOUS at 15:36

## 2025-05-31 RX ADMIN — PRAVASTATIN SODIUM 20 MG: 20 TABLET ORAL at 20:34

## 2025-05-31 RX ADMIN — IPRATROPIUM BROMIDE AND ALBUTEROL SULFATE 1 DOSE: .5; 2.5 SOLUTION RESPIRATORY (INHALATION) at 15:43

## 2025-05-31 RX ADMIN — SODIUM CHLORIDE, POTASSIUM CHLORIDE, SODIUM LACTATE AND CALCIUM CHLORIDE 500 ML: 600; 310; 30; 20 INJECTION, SOLUTION INTRAVENOUS at 15:40

## 2025-05-31 RX ADMIN — TRAZODONE HYDROCHLORIDE 150 MG: 100 TABLET ORAL at 20:34

## 2025-05-31 RX ADMIN — AZITHROMYCIN DIHYDRATE 500 MG: 250 TABLET ORAL at 20:40

## 2025-05-31 RX ADMIN — KETOROLAC TROMETHAMINE 30 MG: 30 INJECTION, SOLUTION INTRAMUSCULAR at 16:50

## 2025-05-31 RX ADMIN — CLONAZEPAM 0.25 MG: 0.5 TABLET ORAL at 20:34

## 2025-05-31 RX ADMIN — ESCITALOPRAM OXALATE 20 MG: 20 TABLET ORAL at 20:35

## 2025-05-31 ASSESSMENT — PAIN SCALES - GENERAL
PAINLEVEL_OUTOF10: 8
PAINLEVEL_OUTOF10: 8
PAINLEVEL_OUTOF10: 7
PAINLEVEL_OUTOF10: 7
PAINLEVEL_OUTOF10: 8

## 2025-05-31 ASSESSMENT — PAIN DESCRIPTION - DESCRIPTORS
DESCRIPTORS: SORE;TIGHTNESS
DESCRIPTORS: SORE;TIGHTNESS

## 2025-05-31 ASSESSMENT — PAIN DESCRIPTION - ORIENTATION
ORIENTATION: ANTERIOR;POSTERIOR
ORIENTATION: ANTERIOR;POSTERIOR

## 2025-05-31 ASSESSMENT — PAIN DESCRIPTION - LOCATION
LOCATION: BACK;CHEST

## 2025-05-31 ASSESSMENT — PAIN - FUNCTIONAL ASSESSMENT: PAIN_FUNCTIONAL_ASSESSMENT: 0-10

## 2025-05-31 NOTE — DISCHARGE INSTRUCTIONS
You are seen in the emergency room for shortness of breath and chest tightness.  Your cardiac workup was within normal limits.  After shared decision making you have decided to go home and follow-up with your cardiologist your primary care physician.  You were treated with steroids here and you are being discharged with steroids to be taken once a day for the next 5 days.  If any of your symptoms worsen you develop any symptoms of concern such as fevers, chills or any new symptoms of concern please return to the emergency room for reevaluation

## 2025-05-31 NOTE — ED PROVIDER NOTES
[facility] EMERGENCY DEPARTMENT  eMERGENCY dEPARTMENT eNCOUnter   Attending Attestation     Pt Name: Eugenie Estrada  MRN: [id number]  Birthdate 1970  Date of evaluation: 5/31/25       Eugenie Estrada is a 55 y.o. female who presents with Chest Pain      History:   Patient is here complaining of chest tightness with a squeezing sensation all around and shortness of breath.  Patient was seen here yesterday had a full workup was negative she felt comfortable going home but states that it came back and she thinks she made a mistake.    Exam: Vitals:   Vitals:    05/31/25 1515 05/31/25 1530   BP: 120/81    Pulse: (!) 115    Resp: 20    Temp:  97.6 °F (36.4 °C)   TempSrc:  Oral   SpO2: 96%      Heart regular rate rhythm no murmurs.  Lungs diminished but clear no respiratory distress.    I performed a history and physical examination of the patient and discussed management with the resident. I reviewed the resident’s note and agree with the documented findings and plan of care. Any areas of disagreement are noted on the chart. I was personally present for the key portions of any procedures. I have documented in the chart those procedures where I was not present during the key portions. I have personally reviewed all images and agree with the resident's interpretation. I have reviewed the emergency nurses triage note. I agree with the chief complaint, past medical history, past surgical history, allergies, medications, social and family history as documented unless otherwise noted below. Documentation of the HPI, Physical Exam and Medical Decision Making performed by medical students or scribes is based on my personal performance of the HPI, PE and MDM. I personally evaluated and examined the patient in conjunction with the APC and agree with the assessment, treatment plan, and disposition of the patient as recorded by the APC. Additional findings are as noted.    Denzel Fragoso MD  Attending Emergency

## 2025-05-31 NOTE — ED NOTES
Report given to MARILYN Hall from [facility].   Report method by phone   The following was reviewed with receiving RN:   Current vital signs:  /78   Pulse 92   Temp 97.6 °F (36.4 °C) (Oral)   Resp 17   LMP  (LMP Unknown)   SpO2 99%                      Any medication or safety alerts were reviewed. Any pending diagnostics and notifications were also reviewed, as well as any safety concerns or issues, abnormal labs, abnormal imaging, and abnormal assessment findings. Questions were answered.

## 2025-05-31 NOTE — H&P
[facility] Internal Medicine   Nato Vides MD; MD Stacy Winn MD, MD , Lexa Barajas MD    [facility] Internal Medicine   IN-PATIENT SERVICE   [facility]    HISTORY AND PHYSICAL EXAMINATION            Date:   5/31/2025  Patient name:  Eugenie Estrada  Date of admission:  5/31/2025  3:14 PM  MRN:   [id number]  Account:  [id number]  YOB: 1970  PCP:    Amy Joseph MD  Room:   05/05  Code Status:    Prior    Chief Complaint:     Chief Complaint   Patient presents with    Chest Pain       History Obtained From:     patient, electronic medical record    History of Present Illness:     Eugenie Estrada is a 55 y.o.  /  female who presents with Chest Pain   and is admitted to the hospital for the management of COPD exacerbation (HCC).    55-year-old female with past medical history of chronic hypoxemic respiratory failure due to COPD on 2 L at baseline, emphysema, multiple psychiatric conditions came in for chest pain and shortness of breath which has been going on for the past 3 days.  Patient was recently seen yesterday for the same reason, patient opted to go home, but felt much worse so prompting her to come in again.  Denies fever, chills, increased sputum production or new cough.  She does admit to chronic tobacco use.  She does have history of coronary artery calcification seen on CAT scan which is being evaluated by cardiology as outpatient.  Was soon to have a stress test done.    Past Medical History:     Past Medical History:   Diagnosis Date    Acute bronchitis with bronchospasm 09/28/2016    Allergic rhinitis 09/28/2016    Anxiety     Anxiety and depression     Apical lung scarring     Right    Asthma     exercized induced    Borderline personality disorder 12/03/2014    Chronic abdominal pain 07/07/2018    Chronic fatigue 06/24/2017    COPD, mild (HCC)     Coronary artery calcification seen on

## 2025-05-31 NOTE — ED PROVIDER NOTES
[facility] MED SURG  Emergency Department Encounter  Emergency Medicine Resident     Pt Name:Eugenie Estrada  MRN: [id number]  Birthdate 1970  Date of evaluation: 5/31/25  PCP:  Amy Joseph MD  Note Started: 3:15 PM EDT      CHIEF COMPLAINT       Chief Complaint   Patient presents with    Chest Pain       HISTORY OF PRESENT ILLNESS  (Location/Symptom, Timing/Onset, Context/Setting, Quality, Duration, Modifying Factors, Severity.)      Eugenie Estrada is a 55 y.o. female who presents with worsening shortness of breath and chest tightness feels like someone is squeezing her.  Patient was seen yesterday with similar symptoms with improvement after Solu-Medrol and breathing treatments.  She decided to go home at that time and feels that she was doing okay until later today when her symptoms returned and now feel worse.  Patient with a history of COPD on 2 L of oxygen via nasal cannula at baseline.  History of emphysema and blebs.  Has not picked up her steroids as they were not filled at the pharmacy yet.  Has had chills but no fevers.  Denies any cough congestion.  No pain radiating into her back.    PAST MEDICAL / SURGICAL / SOCIAL / FAMILY HISTORY      has a past medical history of Acute bronchitis with bronchospasm, Allergic rhinitis, Anxiety, Anxiety and depression, Apical lung scarring, Asthma, Borderline personality disorder, Chronic abdominal pain, Chronic fatigue, COPD, mild (HCC), Coronary artery calcification seen on CAT scan, Emphysema of lung (HCC), History of bronchitis, History of chest pain, History of pneumocystis pneumonia, History of suicide attempt, Hyperglycemia, Moderate episode of recurrent major depressive disorder (HCC), Ovarian cyst, Panic disorder with agoraphobia and severe panic attacks, Panlobular emphysema (HCC), Pleural plaque, Pneumonia, Pneumothorax, spontaneous, tension, PTSD (post-traumatic stress disorder), Restless legs syndrome, Shortness of breath, and Slow transit

## 2025-06-01 LAB
B PARAP IS1001 DNA NPH QL NAA+NON-PROBE: NOT DETECTED
B PERT DNA SPEC QL NAA+PROBE: NOT DETECTED
BASOPHILS # BLD: 0 K/UL (ref 0–0.2)
BASOPHILS NFR BLD: 0 % (ref 0–2)
C PNEUM DNA NPH QL NAA+NON-PROBE: NOT DETECTED
EOSINOPHIL # BLD: 0 K/UL (ref 0–0.4)
EOSINOPHILS RELATIVE PERCENT: 0 % (ref 0–4)
ERYTHROCYTE [DISTWIDTH] IN BLOOD BY AUTOMATED COUNT: 13.8 % (ref 11.5–14.9)
FLUAV RNA NPH QL NAA+NON-PROBE: NOT DETECTED
FLUBV RNA NPH QL NAA+NON-PROBE: NOT DETECTED
HADV DNA NPH QL NAA+NON-PROBE: NOT DETECTED
HCOV 229E RNA NPH QL NAA+NON-PROBE: NOT DETECTED
HCOV HKU1 RNA NPH QL NAA+NON-PROBE: NOT DETECTED
HCOV NL63 RNA NPH QL NAA+NON-PROBE: NOT DETECTED
HCOV OC43 RNA NPH QL NAA+NON-PROBE: NOT DETECTED
HCT VFR BLD AUTO: 34.7 % (ref 36–46)
HGB BLD-MCNC: 11.3 G/DL (ref 12–16)
HMPV RNA NPH QL NAA+NON-PROBE: NOT DETECTED
HPIV1 RNA NPH QL NAA+NON-PROBE: NOT DETECTED
HPIV2 RNA NPH QL NAA+NON-PROBE: NOT DETECTED
HPIV3 RNA NPH QL NAA+NON-PROBE: NOT DETECTED
HPIV4 RNA NPH QL NAA+NON-PROBE: NOT DETECTED
IMM GRANULOCYTES # BLD AUTO: 0 K/UL (ref 0–0.3)
IMM GRANULOCYTES NFR BLD: 0 %
LYMPHOCYTES NFR BLD: 0.56 K/UL (ref 1–4.8)
LYMPHOCYTES RELATIVE PERCENT: 7 % (ref 24–44)
M PNEUMO DNA NPH QL NAA+NON-PROBE: NOT DETECTED
MCH RBC QN AUTO: 28.6 PG (ref 26–34)
MCHC RBC AUTO-ENTMCNC: 32.6 G/DL (ref 31–37)
MCV RBC AUTO: 87.8 FL (ref 80–100)
MONOCYTES NFR BLD: 0.16 K/UL (ref 0.1–1.3)
MONOCYTES NFR BLD: 2 % (ref 1–7)
MORPHOLOGY: NORMAL
NEUTROPHILS NFR BLD: 91 % (ref 36–66)
NEUTS SEG NFR BLD: 7.28 K/UL (ref 1.3–9.1)
NRBC BLD-RTO: 0 PER 100 WBC
PLATELET # BLD AUTO: 207 K/UL (ref 150–450)
PMV BLD AUTO: 9.1 FL (ref 8–13.5)
RBC # BLD AUTO: 3.95 M/UL (ref 3.95–5.11)
RSV RNA NPH QL NAA+NON-PROBE: NOT DETECTED
RV+EV RNA NPH QL NAA+NON-PROBE: NOT DETECTED
SARS-COV-2 RNA NPH QL NAA+NON-PROBE: NOT DETECTED
SPECIMEN DESCRIPTION: NORMAL
WBC OTHER # BLD: 8 K/UL (ref 3.5–11)

## 2025-06-01 PROCEDURE — 85025 COMPLETE CBC W/AUTO DIFF WBC: CPT

## 2025-06-01 PROCEDURE — 6370000000 HC RX 637 (ALT 250 FOR IP)

## 2025-06-01 PROCEDURE — 99232 SBSQ HOSP IP/OBS MODERATE 35: CPT

## 2025-06-01 PROCEDURE — 6360000002 HC RX W HCPCS

## 2025-06-01 PROCEDURE — 94640 AIRWAY INHALATION TREATMENT: CPT

## 2025-06-01 PROCEDURE — 2500000003 HC RX 250 WO HCPCS

## 2025-06-01 PROCEDURE — 99223 1ST HOSP IP/OBS HIGH 75: CPT | Performed by: INTERNAL MEDICINE

## 2025-06-01 PROCEDURE — 1200000000 HC SEMI PRIVATE

## 2025-06-01 PROCEDURE — 2700000000 HC OXYGEN THERAPY PER DAY

## 2025-06-01 PROCEDURE — 6370000000 HC RX 637 (ALT 250 FOR IP): Performed by: NURSE PRACTITIONER

## 2025-06-01 PROCEDURE — 36415 COLL VENOUS BLD VENIPUNCTURE: CPT

## 2025-06-01 PROCEDURE — 94761 N-INVAS EAR/PLS OXIMETRY MLT: CPT

## 2025-06-01 RX ORDER — ALBUTEROL SULFATE 0.83 MG/ML
2.5 SOLUTION RESPIRATORY (INHALATION)
Status: DISCONTINUED | OUTPATIENT
Start: 2025-06-01 | End: 2025-06-02 | Stop reason: HOSPADM

## 2025-06-01 RX ORDER — ALBUTEROL SULFATE 0.83 MG/ML
SOLUTION RESPIRATORY (INHALATION)
Status: DISCONTINUED
Start: 2025-06-01 | End: 2025-06-01 | Stop reason: WASHOUT

## 2025-06-01 RX ORDER — BUDESONIDE AND FORMOTEROL FUMARATE DIHYDRATE 80; 4.5 UG/1; UG/1
2 AEROSOL RESPIRATORY (INHALATION)
Status: DISCONTINUED | OUTPATIENT
Start: 2025-06-01 | End: 2025-06-02 | Stop reason: HOSPADM

## 2025-06-01 RX ADMIN — ESCITALOPRAM OXALATE 20 MG: 20 TABLET ORAL at 21:14

## 2025-06-01 RX ADMIN — ASPIRIN 81 MG: 81 TABLET, COATED ORAL at 08:19

## 2025-06-01 RX ADMIN — IPRATROPIUM BROMIDE AND ALBUTEROL SULFATE 1 DOSE: .5; 2.5 SOLUTION RESPIRATORY (INHALATION) at 13:41

## 2025-06-01 RX ADMIN — ALBUTEROL SULFATE 2.5 MG: 2.5 SOLUTION RESPIRATORY (INHALATION) at 15:40

## 2025-06-01 RX ADMIN — WATER 40 MG: 1 INJECTION INTRAMUSCULAR; INTRAVENOUS; SUBCUTANEOUS at 08:19

## 2025-06-01 RX ADMIN — MIRTAZAPINE 7.5 MG: 15 TABLET, FILM COATED ORAL at 21:14

## 2025-06-01 RX ADMIN — HYDROCODONE BITARTRATE AND ACETAMINOPHEN 1 TABLET: 5; 325 TABLET ORAL at 14:29

## 2025-06-01 RX ADMIN — CLONAZEPAM 0.25 MG: 0.5 TABLET ORAL at 08:19

## 2025-06-01 RX ADMIN — WATER 40 MG: 1 INJECTION INTRAMUSCULAR; INTRAVENOUS; SUBCUTANEOUS at 21:15

## 2025-06-01 RX ADMIN — AZITHROMYCIN DIHYDRATE 500 MG: 250 TABLET ORAL at 08:18

## 2025-06-01 RX ADMIN — SODIUM CHLORIDE, PRESERVATIVE FREE 10 ML: 5 INJECTION INTRAVENOUS at 08:19

## 2025-06-01 RX ADMIN — PRAVASTATIN SODIUM 20 MG: 20 TABLET ORAL at 21:14

## 2025-06-01 RX ADMIN — CLONAZEPAM 0.25 MG: 0.5 TABLET ORAL at 14:29

## 2025-06-01 RX ADMIN — TRAZODONE HYDROCHLORIDE 150 MG: 100 TABLET ORAL at 21:14

## 2025-06-01 RX ADMIN — BUDESONIDE AND FORMOTEROL FUMARATE DIHYDRATE 2 PUFF: 80; 4.5 AEROSOL RESPIRATORY (INHALATION) at 20:11

## 2025-06-01 RX ADMIN — CYANOCOBALAMIN TAB 1000 MCG 1000 MCG: 1000 TAB at 08:18

## 2025-06-01 RX ADMIN — TIOTROPIUM BROMIDE INHALATION SPRAY 2 PUFF: 3.12 SPRAY, METERED RESPIRATORY (INHALATION) at 15:40

## 2025-06-01 RX ADMIN — SODIUM CHLORIDE, PRESERVATIVE FREE 10 ML: 5 INJECTION INTRAVENOUS at 21:15

## 2025-06-01 RX ADMIN — CLONAZEPAM 0.25 MG: 0.5 TABLET ORAL at 21:14

## 2025-06-01 RX ADMIN — IPRATROPIUM BROMIDE AND ALBUTEROL SULFATE 1 DOSE: .5; 2.5 SOLUTION RESPIRATORY (INHALATION) at 09:01

## 2025-06-01 RX ADMIN — ENOXAPARIN SODIUM 40 MG: 100 INJECTION SUBCUTANEOUS at 08:19

## 2025-06-01 RX ADMIN — QUETIAPINE FUMARATE 300 MG: 200 TABLET ORAL at 21:14

## 2025-06-01 RX ADMIN — HYDROCODONE BITARTRATE AND ACETAMINOPHEN 1 TABLET: 5; 325 TABLET ORAL at 22:42

## 2025-06-01 RX ADMIN — QUETIAPINE FUMARATE 200 MG: 200 TABLET ORAL at 08:19

## 2025-06-01 RX ADMIN — HYDROCODONE BITARTRATE AND ACETAMINOPHEN 1 TABLET: 5; 325 TABLET ORAL at 08:25

## 2025-06-01 ASSESSMENT — PAIN DESCRIPTION - LOCATION
LOCATION: CHEST
LOCATION: CHEST;BACK

## 2025-06-01 ASSESSMENT — PAIN SCALES - GENERAL
PAINLEVEL_OUTOF10: 6
PAINLEVEL_OUTOF10: 6
PAINLEVEL_OUTOF10: 8
PAINLEVEL_OUTOF10: 6
PAINLEVEL_OUTOF10: 6
PAINLEVEL_OUTOF10: 8
PAINLEVEL_OUTOF10: 4

## 2025-06-01 ASSESSMENT — PAIN DESCRIPTION - DESCRIPTORS
DESCRIPTORS: ACHING;DISCOMFORT

## 2025-06-01 ASSESSMENT — PAIN DESCRIPTION - ORIENTATION
ORIENTATION: MID

## 2025-06-01 ASSESSMENT — PAIN SCALES - WONG BAKER
WONGBAKER_NUMERICALRESPONSE: NO HURT
WONGBAKER_NUMERICALRESPONSE: NO HURT

## 2025-06-01 NOTE — CARE COORDINATION
Case Management Assessment  Initial Evaluation    Date/Time of Evaluation: 6/1/2025 10:20 AM  Assessment Completed by: Kendra Coronle RN    If patient is discharged prior to next notation, then this note serves as note for discharge by case management.    Patient Name: Eugenie Estrada                   YOB: 1970  Diagnosis: COPD exacerbation (HCC) [J44.1]  Chest pain, unspecified type [R07.9]                   Date / Time: 5/31/2025  3:14 PM    Patient Admission Status: Inpatient   Readmission Risk (Low < 19, Mod (19-27), High > 27): Readmission Risk Score: 15.2    Current PCP: Amy Joseph MD  PCP verified by CM? Yes    Chart Reviewed: Yes      History Provided by: Patient  Patient Orientation: Alert and Oriented    Patient Cognition: Alert    Hospitalization in the last 30 days (Readmission):  No    If yes, Readmission Assessment in  Navigator will be completed.    Advance Directives:      Code Status: Full Code   Patient's Primary Decision Maker is: Legal Next of Kin      Discharge Planning:    Patient lives with: Friends Type of Home: House  Primary Care Giver: Self  Patient Support Systems include: Friends/Neighbors, Children   Current Financial resources: Medicare, Medicaid  Current community resources: None  Current services prior to admission: Durable Medical Equipment, Oxygen Therapy            Current DME: Walker, Oxygen Therapy (Comment), Home Aerosol            Type of Home Care services:  None    ADLS  Prior functional level: Assistance with the following:, Mobility  Current functional level: Assistance with the following:, Mobility    PT AM-PAC:   /24  OT AM-PAC:   /24    Family can provide assistance at DC: No  Would you like Case Management to discuss the discharge plan with any other family members/significant others, and if so, who? Yes (Son; Fareed)  Plans to Return to Present Housing: Yes  Other Identified Issues/Barriers to RETURNING to current housing: Chest pain.

## 2025-06-01 NOTE — CONSULTS
[facility]  In Patient Cardiology Consult      Date:   6/1/2025  Patient name: Eugenie Estrada  Date of admission:  5/31/2025  3:14 PM  MRN:   [id number]  YOB: 1970    Reason for Admission: Worsening shortness of breath, some chest pain    CHIEF COMPLAINT: Worsening shortness of breath, some chest pain    History Obtained From: The patient and chart    HISTORY OF PRESENT ILLNESS:    This is a 55-year-old female known to our practice.  She was following with us as an outpatient.  She has coronary artery calcification on CT, had some shortness of breath, and we recommended stress testing.  Unfortunately was not done yet.  She came to the emergency room with worsening shortness of breath, also had some chest pain, was noted to be in COPD exacerbation.  Cardiology was consulted.  Denies complaints of orthopnea, PND, lower extremity edema, dizziness, syncope.     Past Medical History:    Past Medical History:   Diagnosis Date    Acute bronchitis with bronchospasm 09/28/2016    Allergic rhinitis 09/28/2016    Anxiety     Anxiety and depression     Apical lung scarring     Right    Asthma     exercized induced    Borderline personality disorder 12/03/2014    Chronic abdominal pain 07/07/2018    Chronic fatigue 06/24/2017    COPD, mild (HCC)     Coronary artery calcification seen on CAT scan 03/14/2025    Emphysema of lung (HCC)     Severe    History of bronchitis     History of chest pain     History of pneumocystis pneumonia 09/28/2016    History of suicide attempt     Multiple admissions to [facility] for suicide attempts    Hyperglycemia 06/24/2017    Moderate episode of recurrent major depressive disorder (HCC) 12/03/2014    Ovarian cyst 07/2007    Panic disorder with agoraphobia and severe panic attacks 12/03/2014    Panlobular emphysema (HCC) 06/24/2017    Pleural plaque     from pleurodesis bilaterally. The patient has no personal history of asbestos exposure, but her

## 2025-06-01 NOTE — PLAN OF CARE
Problem: Discharge Planning  Goal: Discharge to home or other facility with appropriate resources  6/1/2025 1614 by Isabel Miller RN  Outcome: Progressing  Flowsheets (Taken 6/1/2025 0815)  Discharge to home or other facility with appropriate resources: Identify barriers to discharge with patient and caregiver  6/1/2025 0240 by Robert Couch RN  Outcome: Progressing  Flowsheets (Taken 5/31/2025 1934)  Discharge to home or other facility with appropriate resources: Identify barriers to discharge with patient and caregiver     Problem: Pain  Goal: Verbalizes/displays adequate comfort level or baseline comfort level  6/1/2025 1614 by Isabel Miller RN  Outcome: Progressing  6/1/2025 0240 by Robert Couch RN  Outcome: Progressing  Flowsheets (Taken 5/31/2025 2104)  Verbalizes/displays adequate comfort level or baseline comfort level: Encourage patient to monitor pain and request assistance     Problem: Safety - Adult  Goal: Free from fall injury  6/1/2025 1614 by Isabel Miller RN  Outcome: Progressing  6/1/2025 0240 by Robert Couch RN  Outcome: Progressing     Problem: ABCDS Injury Assessment  Goal: Absence of physical injury  6/1/2025 1614 by Isabel Miller RN  Outcome: Progressing  6/1/2025 0240 by Robert Couch RN  Outcome: Progressing     Problem: Respiratory - Adult  Goal: Achieves optimal ventilation and oxygenation  6/1/2025 1614 by Isabel Miller RN  Outcome: Progressing  6/1/2025 0240 by Robert Couch RN  Outcome: Progressing  Flowsheets (Taken 5/31/2025 1934)  Achieves optimal ventilation and oxygenation: Assess for changes in respiratory status     Problem: Skin/Tissue Integrity - Adult  Goal: Skin integrity remains intact  6/1/2025 1614 by Isabel Miller RN  Outcome: Progressing  Flowsheets (Taken 6/1/2025 0815)  Skin Integrity Remains Intact: Monitor for areas of redness and/or skin breakdown  6/1/2025 0240 by Robert Couch RN  Outcome: Progressing  Flowsheets (Taken 5/31/2025

## 2025-06-02 ENCOUNTER — HOSPITAL ENCOUNTER (INPATIENT)
Age: 55
Discharge: HOME | DRG: 191 | End: 2025-06-04
Payer: COMMERCIAL

## 2025-06-02 ENCOUNTER — APPOINTMENT (OUTPATIENT)
Dept: NUCLEAR MEDICINE | Age: 55
DRG: 191 | End: 2025-06-02
Payer: COMMERCIAL

## 2025-06-02 ENCOUNTER — TELEPHONE (OUTPATIENT)
Dept: FAMILY MEDICINE CLINIC | Age: 55
End: 2025-06-02

## 2025-06-02 VITALS
BODY MASS INDEX: 21.9 KG/M2 | SYSTOLIC BLOOD PRESSURE: 103 MMHG | HEIGHT: 67 IN | DIASTOLIC BLOOD PRESSURE: 75 MMHG | WEIGHT: 139.55 LBS | HEART RATE: 79 BPM | RESPIRATION RATE: 18 BRPM | TEMPERATURE: 97.9 F | OXYGEN SATURATION: 96 %

## 2025-06-02 PROBLEM — R07.9 CHEST PAIN: Status: ACTIVE | Noted: 2025-06-02

## 2025-06-02 LAB
ECHO BSA: 1.73 M2
EKG ATRIAL RATE: 102 BPM
EKG ATRIAL RATE: 91 BPM
EKG P AXIS: 114 DEGREES
EKG P AXIS: 70 DEGREES
EKG P-R INTERVAL: 162 MS
EKG P-R INTERVAL: 162 MS
EKG Q-T INTERVAL: 358 MS
EKG Q-T INTERVAL: 368 MS
EKG QRS DURATION: 74 MS
EKG QRS DURATION: 76 MS
EKG QTC CALCULATION (BAZETT): 452 MS
EKG QTC CALCULATION (BAZETT): 466 MS
EKG R AXIS: 130 DEGREES
EKG R AXIS: 56 DEGREES
EKG T AXIS: 142 DEGREES
EKG T AXIS: 60 DEGREES
EKG VENTRICULAR RATE: 102 BPM
EKG VENTRICULAR RATE: 91 BPM
NUC STRESS EJECTION FRACTION: 70 %
STRESS BASELINE HR: 72 BPM
STRESS ESTIMATED WORKLOAD: 1 METS
STRESS PEAK DIAS BP: 66 MMHG
STRESS PEAK SYS BP: 117 MMHG
STRESS PERCENT HR ACHIEVED: 66 %
STRESS POST PEAK HR: 111 BPM
STRESS RATE PRESSURE PRODUCT: NORMAL BPM*MMHG
STRESS STAGE RECOVERY 1 BP: NORMAL MMHG
STRESS STAGE RECOVERY 1 DURATION: 1 MIN:SEC
STRESS STAGE RECOVERY 1 HR: 108 BPM
STRESS STAGE RECOVERY 2 BP: NORMAL MMHG
STRESS STAGE RECOVERY 2 DURATION: 3 MIN:SEC
STRESS STAGE RECOVERY 2 HR: 100 BPM
STRESS STAGE RECOVERY 3 BP: NORMAL MMHG
STRESS STAGE RECOVERY 3 DURATION: 5 MIN:SEC
STRESS STAGE RECOVERY 3 HR: 91 BPM
STRESS TARGET HR: 167 BPM
TID: 0.95

## 2025-06-02 PROCEDURE — 99233 SBSQ HOSP IP/OBS HIGH 50: CPT | Performed by: SURGERY

## 2025-06-02 PROCEDURE — 94640 AIRWAY INHALATION TREATMENT: CPT

## 2025-06-02 PROCEDURE — 93016 CV STRESS TEST SUPVJ ONLY: CPT | Performed by: INTERNAL MEDICINE

## 2025-06-02 PROCEDURE — 93010 ELECTROCARDIOGRAM REPORT: CPT | Performed by: INTERNAL MEDICINE

## 2025-06-02 PROCEDURE — 2700000000 HC OXYGEN THERAPY PER DAY

## 2025-06-02 PROCEDURE — 6370000000 HC RX 637 (ALT 250 FOR IP)

## 2025-06-02 PROCEDURE — 3430000000 HC RX DIAGNOSTIC RADIOPHARMACEUTICAL: Performed by: INTERNAL MEDICINE

## 2025-06-02 PROCEDURE — 6370000000 HC RX 637 (ALT 250 FOR IP): Performed by: NURSE PRACTITIONER

## 2025-06-02 PROCEDURE — 94761 N-INVAS EAR/PLS OXIMETRY MLT: CPT

## 2025-06-02 PROCEDURE — 6360000002 HC RX W HCPCS

## 2025-06-02 PROCEDURE — 97165 OT EVAL LOW COMPLEX 30 MIN: CPT

## 2025-06-02 PROCEDURE — 78452 HT MUSCLE IMAGE SPECT MULT: CPT

## 2025-06-02 PROCEDURE — A9500 TC99M SESTAMIBI: HCPCS | Performed by: INTERNAL MEDICINE

## 2025-06-02 PROCEDURE — 93018 CV STRESS TEST I&R ONLY: CPT | Performed by: INTERNAL MEDICINE

## 2025-06-02 PROCEDURE — 2500000003 HC RX 250 WO HCPCS

## 2025-06-02 PROCEDURE — 6360000002 HC RX W HCPCS: Performed by: INTERNAL MEDICINE

## 2025-06-02 PROCEDURE — 97162 PT EVAL MOD COMPLEX 30 MIN: CPT

## 2025-06-02 PROCEDURE — 93017 CV STRESS TEST TRACING ONLY: CPT

## 2025-06-02 PROCEDURE — 99239 HOSP IP/OBS DSCHRG MGMT >30: CPT | Performed by: INTERNAL MEDICINE

## 2025-06-02 RX ORDER — SODIUM CHLORIDE 0.9 % (FLUSH) 0.9 %
10 SYRINGE (ML) INJECTION PRN
Status: DISCONTINUED | OUTPATIENT
Start: 2025-06-02 | End: 2025-06-02 | Stop reason: HOSPADM

## 2025-06-02 RX ORDER — SODIUM CHLORIDE 0.9 % (FLUSH) 0.9 %
5-40 SYRINGE (ML) INJECTION PRN
Status: ACTIVE | OUTPATIENT
Start: 2025-06-02 | End: 2025-06-02

## 2025-06-02 RX ORDER — SODIUM CHLORIDE 9 MG/ML
500 INJECTION, SOLUTION INTRAVENOUS CONTINUOUS PRN
Status: ACTIVE | OUTPATIENT
Start: 2025-06-02 | End: 2025-06-02

## 2025-06-02 RX ORDER — PREDNISONE 20 MG/1
40 TABLET ORAL DAILY
Qty: 6 TABLET | Refills: 0 | Status: SHIPPED | OUTPATIENT
Start: 2025-06-02 | End: 2025-06-05

## 2025-06-02 RX ORDER — AMINOPHYLLINE 25 MG/ML
50 INJECTION, SOLUTION INTRAVENOUS PRN
Status: ACTIVE | OUTPATIENT
Start: 2025-06-02 | End: 2025-06-02

## 2025-06-02 RX ORDER — ATROPINE SULFATE 0.1 MG/ML
0.5 INJECTION INTRAVENOUS EVERY 5 MIN PRN
Status: ACTIVE | OUTPATIENT
Start: 2025-06-02 | End: 2025-06-02

## 2025-06-02 RX ORDER — METOPROLOL TARTRATE 1 MG/ML
5 INJECTION, SOLUTION INTRAVENOUS EVERY 5 MIN PRN
Status: ACTIVE | OUTPATIENT
Start: 2025-06-02 | End: 2025-06-02

## 2025-06-02 RX ORDER — REGADENOSON 0.08 MG/ML
0.4 INJECTION, SOLUTION INTRAVENOUS
Status: COMPLETED | OUTPATIENT
Start: 2025-06-02 | End: 2025-06-02

## 2025-06-02 RX ORDER — TETRAKIS(2-METHOXYISOBUTYLISOCYANIDE)COPPER(I) TETRAFLUOROBORATE 1 MG/ML
35 INJECTION, POWDER, LYOPHILIZED, FOR SOLUTION INTRAVENOUS
Status: COMPLETED | OUTPATIENT
Start: 2025-06-02 | End: 2025-06-02

## 2025-06-02 RX ORDER — NITROGLYCERIN 0.4 MG/1
0.4 TABLET SUBLINGUAL EVERY 5 MIN PRN
Status: ACTIVE | OUTPATIENT
Start: 2025-06-02 | End: 2025-06-02

## 2025-06-02 RX ORDER — ALBUTEROL SULFATE 90 UG/1
2 INHALANT RESPIRATORY (INHALATION) PRN
Status: ACTIVE | OUTPATIENT
Start: 2025-06-02 | End: 2025-06-02

## 2025-06-02 RX ORDER — TETRAKIS(2-METHOXYISOBUTYLISOCYANIDE)COPPER(I) TETRAFLUOROBORATE 1 MG/ML
10 INJECTION, POWDER, LYOPHILIZED, FOR SOLUTION INTRAVENOUS
Status: COMPLETED | OUTPATIENT
Start: 2025-06-02 | End: 2025-06-02

## 2025-06-02 RX ADMIN — CLONAZEPAM 0.25 MG: 0.5 TABLET ORAL at 10:14

## 2025-06-02 RX ADMIN — TIOTROPIUM BROMIDE INHALATION SPRAY 2 PUFF: 3.12 SPRAY, METERED RESPIRATORY (INHALATION) at 07:35

## 2025-06-02 RX ADMIN — CYANOCOBALAMIN TAB 1000 MCG 1000 MCG: 1000 TAB at 10:14

## 2025-06-02 RX ADMIN — ASPIRIN 81 MG: 81 TABLET, COATED ORAL at 10:14

## 2025-06-02 RX ADMIN — ALBUTEROL SULFATE 2.5 MG: 2.5 SOLUTION RESPIRATORY (INHALATION) at 07:25

## 2025-06-02 RX ADMIN — ENOXAPARIN SODIUM 40 MG: 100 INJECTION SUBCUTANEOUS at 10:15

## 2025-06-02 RX ADMIN — REGADENOSON 0.4 MG: 0.08 INJECTION, SOLUTION INTRAVENOUS at 08:43

## 2025-06-02 RX ADMIN — QUETIAPINE FUMARATE 200 MG: 200 TABLET ORAL at 10:14

## 2025-06-02 RX ADMIN — Medication 37.8 MILLICURIE: at 13:05

## 2025-06-02 RX ADMIN — AZITHROMYCIN DIHYDRATE 500 MG: 250 TABLET ORAL at 10:14

## 2025-06-02 RX ADMIN — SODIUM CHLORIDE, PRESERVATIVE FREE 10 ML: 5 INJECTION INTRAVENOUS at 10:20

## 2025-06-02 RX ADMIN — BUDESONIDE AND FORMOTEROL FUMARATE DIHYDRATE 2 PUFF: 80; 4.5 AEROSOL RESPIRATORY (INHALATION) at 07:35

## 2025-06-02 RX ADMIN — WATER 40 MG: 1 INJECTION INTRAMUSCULAR; INTRAVENOUS; SUBCUTANEOUS at 10:14

## 2025-06-02 RX ADMIN — ALBUTEROL SULFATE 2.5 MG: 2.5 SOLUTION RESPIRATORY (INHALATION) at 15:21

## 2025-06-02 RX ADMIN — ALBUTEROL SULFATE 2.5 MG: 2.5 SOLUTION RESPIRATORY (INHALATION) at 11:23

## 2025-06-02 RX ADMIN — Medication 17.8 MILLICURIE: at 08:45

## 2025-06-02 RX ADMIN — CLONAZEPAM 0.25 MG: 0.5 TABLET ORAL at 15:43

## 2025-06-02 NOTE — PLAN OF CARE
Problem: Discharge Planning  Goal: Discharge to home or other facility with appropriate resources  6/2/2025 1537 by Apryl Tellez RN  Outcome: Adequate for Discharge  Flowsheets (Taken 6/2/2025 1015)  Discharge to home or other facility with appropriate resources: Identify barriers to discharge with patient and caregiver  6/2/2025 0522 by Robert Couch RN  Outcome: Progressing  Flowsheets (Taken 6/1/2025 2120)  Discharge to home or other facility with appropriate resources: Identify barriers to discharge with patient and caregiver     Problem: Pain  Goal: Verbalizes/displays adequate comfort level or baseline comfort level  6/2/2025 1537 by Apryl Tellez RN  Outcome: Adequate for Discharge  6/2/2025 0522 by Robert Couch RN  Outcome: Progressing     Problem: Safety - Adult  Goal: Free from fall injury  6/2/2025 1537 by Apryl Tellez RN  Outcome: Adequate for Discharge  Flowsheets (Taken 6/2/2025 1036)  Free From Fall Injury: Instruct family/caregiver on patient safety  6/2/2025 0522 by Robert Couch RN  Outcome: Progressing     Problem: ABCDS Injury Assessment  Goal: Absence of physical injury  6/2/2025 1537 by Apryl Tellez RN  Outcome: Adequate for Discharge  6/2/2025 0522 by Robert Couch RN  Outcome: Progressing     Problem: Respiratory - Adult  Goal: Achieves optimal ventilation and oxygenation  6/2/2025 1537 by Apryl Tellez RN  Outcome: Adequate for Discharge  Flowsheets (Taken 6/2/2025 1015)  Achieves optimal ventilation and oxygenation:   Assess for changes in respiratory status   Assess for changes in mentation and behavior   Oxygen supplementation based on oxygen saturation or arterial blood gases  6/2/2025 0522 by Robert Couch RN  Outcome: Progressing  Flowsheets (Taken 6/1/2025 2120)  Achieves optimal ventilation and oxygenation: Assess for changes in respiratory status     Problem: Skin/Tissue Integrity - Adult  Goal: Skin integrity remains intact  6/2/2025 1537 by Apryl Tellez

## 2025-06-02 NOTE — TELEPHONE ENCOUNTER
[facility] ED Follow up Call    Reason for ED visit:  CHEST PAIN         Hi Eugenie , this is ENEDELIA from Amy Gaviria's office, just calling to see how you are doing after your recent ED visit.    Did you receive discharge instructions?  Yes  Do you understand the discharge instructions? Yes  Did the ED give you any new prescriptions? Yes  Were you able to fill your prescriptions? Yes      Do you have one of our red, yellow and green  Zone sheets that help you to determine when you should go to the ED?Not Applicable      Do you need or want to make a follow up appt with your PCP?No/PATIENT RETURNED TO EMERGENCY ROOM CURRENTLY ADMITTED    Do you have any further needs in the home i.e. Equipment?  No        FU appts/Provider:    Future Appointments   Date Time Provider Department Center   6/12/2025 12:30 PM SCHEDULE, [facility] [place] [facility]   7/25/2025  1:00 PM Amy Joseph MD fp [facility] ECC DEP   7/31/2025  1:15 PM Maria Del Rosario Lau MD [place] [facility]   11/3/2025 10:15 AM Vick Lion MD [facility]   3/27/2026  1:00 PM Amy Joseph MD fp [facility] ECC DEP

## 2025-06-02 NOTE — PROGRESS NOTES
[facility]   Nato Vides MD; MD Stacy Winn MD, MD , Lexa Barajas MD    [facility]   IN-PATIENT SERVICE   [facility]    Progress Note            Date:   6/1/2025  Patient name:  Eugenie Estrada  Date of admission:  5/31/2025  3:14 PM  MRN:   [id number]  Account:  [id number]  YOB: 1970  PCP:    Amy Joseph MD  Room:   2073/2073-01  Code Status:    Full Code    Chief Complaint:     Chief Complaint   Patient presents with    Chest Pain       History Obtained From:     patient, electronic medical record    History of Present Illness:     Eugenie Estrada is a 55 y.o.  /  female who presents with Chest Pain   and is admitted to the hospital for the management of COPD exacerbation (HCC).    55-year-old female with past medical history of chronic hypoxemic respiratory failure due to COPD on 2 L at baseline, emphysema, multiple psychiatric conditions came in for chest pain and shortness of breath which has been going on for the past 3 days.  Patient was recently seen yesterday for the same reason, patient opted to go home, but felt much worse so prompting her to come in again.  Denies fever, chills, increased sputum production or new cough.  She does admit to chronic tobacco use.  She does have history of coronary artery calcification seen on CAT scan which is being evaluated by cardiology as outpatient.  Was soon to have a stress test done.    Past Medical History:     Past Medical History:   Diagnosis Date    Acute bronchitis with bronchospasm 09/28/2016    Allergic rhinitis 09/28/2016    Anxiety     Anxiety and depression     Apical lung scarring     Right    Asthma     exercized induced    Borderline personality disorder 12/03/2014    Chronic abdominal pain 07/07/2018    Chronic fatigue 06/24/2017    COPD, mild (HCC)     Coronary artery calcification seen on CAT scan 
AVS reviewed with patient, all questions were answered, IV removed with no complications. Awaiting on her personal transportation, portable oxygen tank at bedside.  
BRONCHOSPASM/BRONCHOCONSTRICTION     [x]         IMPROVE AERATION/BREATH SOUNDS  [x]   ADMINISTER BRONCHODILATOR THERAPY AS APPROPRIATE  [x]   ASSESS BREATH SOUNDS  []   IMPLEMENT AEROSOL/MDI PROTOCOL  [x]   PATIENT EDUCATION AS NEEDED    
CLINICAL PHARMACY NOTE: MEDS TO BEDS    Total # of Prescriptions Filled: 1   The following medications were delivered to the patient:  Prednisone 20MG Tablets     Additional Documentation:  Delivered to the PT at 12:55PM 6/2/25  
Patient off floor for second portion of stress test   
Physical Therapy        Physical Therapy Cancel Note      DATE: 2025    NAME: Eugenie Estrada  MRN: [id number]   : 1970      Patient not seen this date for Physical Therapy due to:    2025 at 818 and 920:  Pt unavailable for PT evaluation.  Attempted twice but pt out of room for a stress test per nurse Alvarado.  Will check in the afternoon if time permits.       Electronically signed by Vivi Coughlin PT on 2025 at 11:12 AM      
Physical Therapy  [facility]   Physical Therapy Evaluation  Date: 25  Patient Name: Eugenie Estrada       Room: 3-01  MRN: [id number]  Account: [id number]   : 1970  (55 y.o.) Gender: female     Discharge Recommendations:  Discharge Recommendations: Continue to assess pending progress     PT D/C Equipment  Other: Shower chair/ tub transfer bench  PT Equipment Recommendations  Equipment Needed: Yes  Mobility Devices: ADL Assistive Devices  Other: Shower chair/ tub transfer bench     Past Medical History:  has a past medical history of Acute bronchitis with bronchospasm, Allergic rhinitis, Anxiety, Anxiety and depression, Apical lung scarring, Asthma, Borderline personality disorder, Chronic abdominal pain, Chronic fatigue, COPD, mild (HCC), Coronary artery calcification seen on CAT scan, Emphysema of lung (HCC), History of bronchitis, History of chest pain, History of pneumocystis pneumonia, History of suicide attempt, Hyperglycemia, Moderate episode of recurrent major depressive disorder (HCC), Ovarian cyst, Panic disorder with agoraphobia and severe panic attacks, Panlobular emphysema (HCC), Pleural plaque, Pneumonia, Pneumothorax, spontaneous, tension, PTSD (post-traumatic stress disorder), Restless legs syndrome, Shortness of breath, and Slow transit constipation.  Past Surgical History:   has a past surgical history that includes Lung surgery (,);  section; Hysterectomy (); other surgical history (Bilateral, ); and Hysterectomy, total abdominal.    Subjective  Subjective  Subjective: pt agreeable to PT evaluation     General  Patient assessed for rehabilitation services?: Yes  Additional Pertinent Hx: per ED provider note: Eugenie Estrada is a 55 y.o. female who presents with worsening shortness of breath and chest tightness feels like someone is squeezing her.  Patient was seen yesterday with similar symptoms with improvement after Solu-Medrol and 
Pt arrived to room 2073. VSS. Standard safety measures in place. Admission questions and assessment completed. Pt informed on plan of care and stated understanding.   
[facility]   Occupational Therapy Evaluation  Date: 25  Patient Name: Eugenie Estrada       Room: -  MRN: [id number]  Account: [id number]   : 1970  (55 y.o.) Gender: female     Discharge Recommendations:  The patient's needs are being met with no further Occupational Therapy recommended at discharge.          Referring Practitioner: Campbell Barajas MD  Diagnosis: COPD exacerbation           Past Medical History:  has a past medical history of Acute bronchitis with bronchospasm, Allergic rhinitis, Anxiety, Anxiety and depression, Apical lung scarring, Asthma, Borderline personality disorder, Chronic abdominal pain, Chronic fatigue, COPD, mild (HCC), Coronary artery calcification seen on CAT scan, Emphysema of lung (HCC), History of bronchitis, History of chest pain, History of pneumocystis pneumonia, History of suicide attempt, Hyperglycemia, Moderate episode of recurrent major depressive disorder (HCC), Ovarian cyst, Panic disorder with agoraphobia and severe panic attacks, Panlobular emphysema (HCC), Pleural plaque, Pneumonia, Pneumothorax, spontaneous, tension, PTSD (post-traumatic stress disorder), Restless legs syndrome, Shortness of breath, and Slow transit constipation.    Past Surgical History:   has a past surgical history that includes Lung surgery (,);  section; Hysterectomy (); other surgical history (Bilateral, ); and Hysterectomy, total abdominal.    Restrictions  Restrictions/Precautions  Restrictions/Precautions: Fall Risk  Activity Level: Up as Tolerated  Required Braces or Orthoses?: No  Implants Present? :  (pt denies)  Position Activity Restriction  Other Position/Activity Restrictions: hx of panic disorder w/ agoraphobia      Vitals  Vitals  O2 Device: Nasal cannula (2L)     Subjective  Subjective: Pt is agreeable and pleasant for therapy. Attempts made to evaluate pt prior at 8:18 and 9:23; where pt was OOR for testing both 
Stress test results sent to cardio NP   
Writer notified Dr. JIM Bojorquez of new pt consult and he stated they will see pt during rounds tomorrow.   
  Awating stress test  if no ischemia on stress   then no further cardiac workup needed and cardiology is signing off      Electronically signed by LUCIAN Calderon NP on 6/2/2025 at 10:38 AM  [facility].  [phone]         
MD Kurt   albuterol sulfate HFA (PROVENTIL;VENTOLIN;PROAIR) 108 (90 Base) MCG/ACT inhaler INHALE 2 PUFFS BY MOUTH AND INTO THE LUNGS EVERY 6 HOURS IF NEEDED FOR WHEEZING OR SHORTNESS OF BREATH 10/7/24  Yes Amy Joseph MD   ipratropium 0.5 mg-albuterol 2.5 mg (DUONEB) 0.5-2.5 (3) MG/3ML SOLN nebulizer solution Inhale 3 mLs into the lungs every 4 hours (while awake) 9/12/24  Yes Joanna Leyva MD   clonazePAM (KLONOPIN) 0.5 MG tablet Take 0.5 tablets by mouth in the morning and at bedtime. 1/18/22  Yes Kurt Peterson MD   traZODone (DESYREL) 150 MG tablet Take 1 tablet by mouth nightly   Yes Kurt Peterson MD   escitalopram (LEXAPRO) 20 MG tablet Take 1 tablet by mouth nightly   Yes Kurt Peterson MD   QUEtiapine (SEROQUEL) 200 MG tablet Take 1 tablet by mouth 2 times daily at 0800 and 1400  Patient taking differently: Take 1 tablet by mouth daily 10/13/15  Yes Brennon Cheng MD   QUEtiapine (SEROQUEL) 400 MG tablet Take 1 tablet by mouth daily  Patient taking differently: Take 300 mg by mouth nightly 10/13/15  Yes Brennon Cheng MD   predniSONE (DELTASONE) 20 MG tablet Take 2 tablets by mouth daily for 5 days 5/30/25 6/4/25  Anette Case MD   nicotine (NICODERM CQ) 21 MG/24HR Place 1 patch onto the skin every 24 hours Step 1  Patient not taking: Reported on 5/31/2025 3/26/25   Amy Joseph MD   clotrimazole-betamethasone (LOTRISONE) 1-0.05 % cream Apply topically 2 times daily x 4 weeks on the right elbow  Patient not taking: Reported on 5/31/2025 3/26/25   Amy Joseph MD   Oxygen Concentrator     ProviderKurt MD        Allergies:     Bee pollen, Neosporin [neomycin-polymyxin-gramicidin], and Triple antibiotic [neomycin-bacitracin zn-polymyx]    Social History:     Tobacco:    reports that she has been smoking cigarettes. She started smoking about 35 years ago. She has a 35.4 pack-year smoking history. She has been exposed to tobacco

## 2025-06-02 NOTE — PLAN OF CARE
Problem: Discharge Planning  Goal: Discharge to home or other facility with appropriate resources  6/2/2025 0522 by Robert Couch RN  Outcome: Progressing  Flowsheets (Taken 6/1/2025 2120)  Discharge to home or other facility with appropriate resources: Identify barriers to discharge with patient and caregiver     Problem: Pain  Goal: Verbalizes/displays adequate comfort level or baseline comfort level  6/2/2025 0522 by Robert Couch RN  Outcome: Progressing     Problem: Safety - Adult  Goal: Free from fall injury  6/2/2025 0522 by Robert Couch RN  Outcome: Progressing     Problem: ABCDS Injury Assessment  Goal: Absence of physical injury  6/2/2025 0522 by Robert Couch RN  Outcome: Progressing     Problem: Respiratory - Adult  Goal: Achieves optimal ventilation and oxygenation  6/2/2025 0522 by Robert Couch RN  Outcome: Progressing  Flowsheets (Taken 6/1/2025 2120)  Achieves optimal ventilation and oxygenation: Assess for changes in respiratory status     Problem: Skin/Tissue Integrity - Adult  Goal: Skin integrity remains intact  6/2/2025 0522 by Robert Couch RN  Outcome: Progressing  Flowsheets (Taken 6/1/2025 2120)  Skin Integrity Remains Intact: Monitor for areas of redness and/or skin breakdown

## 2025-06-02 NOTE — CARE COORDINATION
DISCHARGE PLANNING NOTE:    IMM letter provided to patient.  Patient offered four hours to make informed decision regarding appeal process; patient agreeable to discharge.     Electronically signed by Kendra Coronel RN on 6/2/2025 at 1:14 PM

## 2025-06-02 NOTE — CARE COORDINATION
Case Management   Daily Progress Note       Patient Name: Eugenie Estrada                   YOB: 1970  Diagnosis: COPD exacerbation (HCC) [J44.1]  Chest pain, unspecified type [R07.9]                         days  Length of Stay: 2  days    Readmission Risk (Low < 19, Mod (19-27), High > 27): Readmission Risk Score: 15.2      Patient is alert and oriented.    Spoke with patient, and Current Transitional Plan is:    [x] Home Independently    [] Home with HC    [] Skilled Nursing Facility    [] Acute Rehabilitation    [] Long Term Acute Care (LTAC)    [] Other:     Medical Management: Stress test complete.    Testing Ordered: Stress test.    Additional Notes: Patient is on baseline oxygen of 2L NC, saturation 98% If stress test negative will discharge home today.     Electronically signed by Kendra Coronel RN on 6/2/2025 at 12:34 PM

## 2025-06-03 ENCOUNTER — TELEPHONE (OUTPATIENT)
Dept: FAMILY MEDICINE CLINIC | Age: 55
End: 2025-06-03

## 2025-06-03 NOTE — TELEPHONE ENCOUNTER
[facility] ED Follow up Call    Reason for ED visit:   5/31/2025 - 6/2/2025 (2 days)  [facility]       Campbell Barajas MD  Last attending  Treatment team COPD exacerbation (HCC)  Principal problem       Guille Traore ,     This is Keren Doss from Amy Gaviria's office, just calling to see how you are doing after your recent ED visit.    Did you receive discharge instructions?  Yes  Do you understand the discharge instructions? Yes  Did the ED give you any new prescriptions? Yes  Were you able to fill your prescriptions? Yes    Do you have one of our red, yellow and green  Zone sheets that help you to determine when you should go to the ED?Yes    Do you need or want to make a follow up appt with your PCP?No    Do you have any further needs in the home i.e. Equipment?  No    FU appts/Provider:      Future Appointments   Date Time Provider Department Center   7/25/2025  1:00 PM Amy Joseph MD fp [facility]   7/31/2025  1:15 PM Maria Del Rosario Lau MD [facility]   11/3/2025 10:15 AM Vick Lion MD [facility]   3/27/2026  1:00 PM Amy Joseph MD [facility]P

## 2025-06-04 NOTE — DISCHARGE SUMMARY
IN-PATIENT SERVICE   [facility] Internal Medicine    Discharge Summary     Patient ID: Eugenie Estrada  :  1970   MRN: [id number]     ACCOUNT:  [id number]   Patient's PCP: Amy Joseph MD  Admit Date: 2025   Discharge Date: 2025    Length of Stay: 2  Code Status:  Prior  Admitting Physician: Campbell Barajas MD  Discharge Physician: Stacy Brown MD     Active Discharge Diagnoses:     Primary Problem  COPD exacerbation (HCC)      Hospital Problems  Active Hospital Problems    Diagnosis Date Noted    Chest pain [R07.9] 2025    COPD exacerbation (HCC) [J44.1] 2025       Admission Condition:  fair     Discharged Condition: fair    Hospital Stay:     Hospital Course:  Eugenie Estrada is a 55 y.o. female who was admitted for the management of COPD exacerbation (HCC) , presented to ER with Chest Pain    55-year-old female with past medical history of chronic hypoxemic respiratory failure due to COPD on 2 L at baseline, emphysema, multiple psychiatric conditions came in for chest pain and shortness of breath which has been going on for the past 3 days. Patient was recently seen yesterday for the same reason, patient opted to go home, but felt much worse so prompting her to come in again. Denies fever, chills, increased sputum production or new cough. She does admit to chronic tobacco use. She does have history of coronary artery calcification seen on CAT scan which is being evaluated by cardiology as outpatient. Was soon to have a stress test done.     Patient was treated for acute COPD exacerbation cardiology was consulted, had stress test which was low risk, cleared for discharge by cardiology discharged home in stable condition    Significant therapeutic interventions:     Significant Diagnostic Studies:   Labs / Micro:        Radiology:    Nuclear stress test with myocardial perfusion  Result Date: 2025    Stress Combined Conclusion: The study is negative for 
yes

## 2025-07-24 DIAGNOSIS — D64.9 ANEMIA, UNSPECIFIED TYPE: ICD-10-CM

## 2025-07-24 RX ORDER — LANOLIN ALCOHOL/MO/W.PET/CERES
1000 CREAM (GRAM) TOPICAL DAILY
Qty: 90 TABLET | Refills: 3 | Status: SHIPPED | OUTPATIENT
Start: 2025-07-24

## 2025-07-24 NOTE — TELEPHONE ENCOUNTER
Please Approve or Refuse.  Send to Pharmacy per Pt's Request:      Next Visit Date:  Visit date not found   Last Visit Date: 3/26/2025    Hemoglobin A1C (%)   Date Value   01/04/2025 5.4   07/06/2018 5.4   06/23/2017 5.1             ( goal A1C is < 7)   BP Readings from Last 3 Encounters:   06/02/25 103/75   05/30/25 104/62   05/19/25 100/80          (goal 120/80)  BUN   Date Value Ref Range Status   05/31/2025 14 6 - 20 mg/dL Final     Creatinine   Date Value Ref Range Status   05/31/2025 0.9 0.7 - 1.2 mg/dL Final     Potassium   Date Value Ref Range Status   05/31/2025 4.0 3.7 - 5.3 mmol/L Final